# Patient Record
Sex: MALE | Race: WHITE | NOT HISPANIC OR LATINO | Employment: UNEMPLOYED | URBAN - METROPOLITAN AREA
[De-identification: names, ages, dates, MRNs, and addresses within clinical notes are randomized per-mention and may not be internally consistent; named-entity substitution may affect disease eponyms.]

---

## 2017-10-04 ENCOUNTER — TELEPHONE (OUTPATIENT)
Dept: PRIMARY CARE CLINIC | Facility: CLINIC | Age: 27
End: 2017-10-04

## 2017-10-04 NOTE — TELEPHONE ENCOUNTER
----- Message from Liannaloree Sutton sent at 10/4/2017 11:14 AM CDT -----  Patient states that he need to see the doctor/NP today for swollen foot and headaches.  Please call [phone].

## 2017-10-04 NOTE — TELEPHONE ENCOUNTER
Spoke with patient, states foot is swollen and thinks it is a gout attack, notified patient we have no openings for today states understanding and scheduled appointment for patient tomorrow morning

## 2017-10-05 ENCOUNTER — TELEPHONE (OUTPATIENT)
Dept: PRIMARY CARE CLINIC | Facility: CLINIC | Age: 27
End: 2017-10-05

## 2017-10-05 ENCOUNTER — OFFICE VISIT (OUTPATIENT)
Dept: PRIMARY CARE CLINIC | Facility: CLINIC | Age: 27
End: 2017-10-05
Payer: COMMERCIAL

## 2017-10-05 VITALS
TEMPERATURE: 99 F | RESPIRATION RATE: 18 BRPM | BODY MASS INDEX: 45.92 KG/M2 | DIASTOLIC BLOOD PRESSURE: 85 MMHG | HEIGHT: 68 IN | SYSTOLIC BLOOD PRESSURE: 137 MMHG | WEIGHT: 303 LBS | HEART RATE: 86 BPM

## 2017-10-05 DIAGNOSIS — L21.0 DANDRUFF IN ADULT: ICD-10-CM

## 2017-10-05 DIAGNOSIS — M10.9 GOUT, ARTHRITIS: Primary | ICD-10-CM

## 2017-10-05 DIAGNOSIS — E66.3 OVERWEIGHT: ICD-10-CM

## 2017-10-05 PROCEDURE — 90686 IIV4 VACC NO PRSV 0.5 ML IM: CPT | Mod: S$GLB,,, | Performed by: INTERNAL MEDICINE

## 2017-10-05 PROCEDURE — 99999 PR PBB SHADOW E&M-EST. PATIENT-LVL III: CPT | Mod: PBBFAC,,, | Performed by: INTERNAL MEDICINE

## 2017-10-05 PROCEDURE — 99213 OFFICE O/P EST LOW 20 MIN: CPT | Mod: 25,S$GLB,, | Performed by: INTERNAL MEDICINE

## 2017-10-05 PROCEDURE — 90471 IMMUNIZATION ADMIN: CPT | Mod: S$GLB,,, | Performed by: INTERNAL MEDICINE

## 2017-10-05 NOTE — PROGRESS NOTES
Flu vaccine 0.5ml IM given left deltoid w/aseptic technique, tolerated well.  No adverse reaction noted.  VIS given. Voiced no complaints.

## 2017-10-05 NOTE — TELEPHONE ENCOUNTER
----- Message from Selma Aguila sent at 10/5/2017 11:19 AM CDT -----  Contact: Jodi with [facility] phone [phone] fax [phone]  Jodi with [facility] phone [phone] fax [phone], Needs to know for Rx Nizoral shampoo is it 1% or 2%. Pleases advise. Thanks.

## 2017-10-06 RX ORDER — KETOCONAZOLE 20 MG/ML
SHAMPOO, SUSPENSION TOPICAL
Qty: 300 ML | Refills: 1
Start: 2017-10-09 | End: 2018-08-09

## 2017-10-06 RX ORDER — CEPHALEXIN 500 MG/1
500 TABLET ORAL 4 TIMES DAILY
Qty: 40 TABLET | Refills: 0
Start: 2017-10-06 | End: 2017-10-16

## 2017-10-06 RX ORDER — PREDNISONE 20 MG/1
20 TABLET ORAL 2 TIMES DAILY
Qty: 14 TABLET | Refills: 0
Start: 2017-10-06 | End: 2017-10-13

## 2017-10-06 NOTE — PROGRESS NOTES
Subjective:       Patient ID: Mickey D Jeansonne is a 27 y.o. male.    Chief Complaint: Foot Pain (right) and Headache    HPI  Pt c/o rt ankle swelling and pain few days not better with gout med at home no trauma no other joint pain and bad dandruff on scalp itching no fever chill no sob cp   Review of Systems    Objective:      Physical Exam   Constitutional: He is oriented to person, place, and time. He appears well-developed and well-nourished. No distress.   overwt   HENT:   Head: Normocephalic and atraumatic.   Right Ear: External ear normal.   Left Ear: External ear normal.   Nose: Nose normal.   Mouth/Throat: Oropharynx is clear and moist. No oropharyngeal exudate.   Scalp with thick browniish oily dandruff   Eyes: Conjunctivae and EOM are normal. Pupils are equal, round, and reactive to light. Right eye exhibits no discharge. Left eye exhibits no discharge.   Neck: Normal range of motion. Neck supple. No thyromegaly present.   Cardiovascular: Normal rate, regular rhythm, normal heart sounds and intact distal pulses.  Exam reveals no gallop and no friction rub.    No murmur heard.  Pulmonary/Chest: Effort normal and breath sounds normal. No respiratory distress. He has no wheezes. He has no rales. He exhibits no tenderness.   Abdominal: Soft. Bowel sounds are normal. He exhibits no distension. There is no tenderness. There is no rebound and no guarding.   Musculoskeletal: Normal range of motion. He exhibits tenderness (rt ankle tender swollen). He exhibits no edema or deformity.   Lymphadenopathy:     He has no cervical adenopathy.   Neurological: He is alert and oriented to person, place, and time.   Skin: Skin is warm and dry. Capillary refill takes less than 2 seconds. No rash noted. No erythema.   Psychiatric: He has a normal mood and affect. Judgment and thought content normal.   Nursing note and vitals reviewed.      Assessment:       1. Gout, arthritis    2. Dandruff in adult    3. Overweight         Plan:       Gout, arthritis  Comments:  rt ankle    Dandruff in adult    Overweight  Comments:  wt loss diet    Other orders  -     Influenza - Quadrivalent (3 years & older) (PF)

## 2017-11-17 RX ORDER — FUROSEMIDE 40 MG/1
40 TABLET ORAL 2 TIMES DAILY
Qty: 60 TABLET | Refills: 11 | Status: SHIPPED | OUTPATIENT
Start: 2017-11-17 | End: 2022-09-01 | Stop reason: SDUPTHER

## 2017-11-17 NOTE — TELEPHONE ENCOUNTER
----- Message from Jessie Galvez sent at 11/17/2017  9:32 AM CST -----  Contact: Gold Kelley is requesting refill Rx Lasix to be sent to [facility] on [address]. Out of medication. Thanks!

## 2018-08-09 ENCOUNTER — OFFICE VISIT (OUTPATIENT)
Dept: PRIMARY CARE CLINIC | Facility: CLINIC | Age: 28
End: 2018-08-09
Payer: COMMERCIAL

## 2018-08-09 VITALS
SYSTOLIC BLOOD PRESSURE: 149 MMHG | WEIGHT: 303 LBS | HEART RATE: 118 BPM | HEIGHT: 68 IN | DIASTOLIC BLOOD PRESSURE: 83 MMHG | BODY MASS INDEX: 45.92 KG/M2 | RESPIRATION RATE: 18 BRPM | OXYGEN SATURATION: 98 %

## 2018-08-09 DIAGNOSIS — E66.9 OBESITY, UNSPECIFIED CLASSIFICATION, UNSPECIFIED OBESITY TYPE, UNSPECIFIED WHETHER SERIOUS COMORBIDITY PRESENT: ICD-10-CM

## 2018-08-09 DIAGNOSIS — M10.9 GOUT, UNSPECIFIED CAUSE, UNSPECIFIED CHRONICITY, UNSPECIFIED SITE: ICD-10-CM

## 2018-08-09 DIAGNOSIS — M25.473 ANKLE EDEMA: ICD-10-CM

## 2018-08-09 DIAGNOSIS — M79.672 LEFT FOOT PAIN: Primary | ICD-10-CM

## 2018-08-09 DIAGNOSIS — I10 HYPERTENSION, UNSPECIFIED TYPE: ICD-10-CM

## 2018-08-09 PROCEDURE — 3008F BODY MASS INDEX DOCD: CPT | Mod: CPTII,S$GLB,, | Performed by: FAMILY MEDICINE

## 2018-08-09 PROCEDURE — 99999 PR PBB SHADOW E&M-EST. PATIENT-LVL IV: CPT | Mod: PBBFAC,,, | Performed by: FAMILY MEDICINE

## 2018-08-09 PROCEDURE — 81002 URINALYSIS NONAUTO W/O SCOPE: CPT | Mod: S$GLB,,, | Performed by: FAMILY MEDICINE

## 2018-08-09 PROCEDURE — 96372 THER/PROPH/DIAG INJ SC/IM: CPT | Mod: S$GLB,,, | Performed by: FAMILY MEDICINE

## 2018-08-09 PROCEDURE — 93000 ELECTROCARDIOGRAM COMPLETE: CPT | Mod: S$GLB,,, | Performed by: FAMILY MEDICINE

## 2018-08-09 PROCEDURE — 99213 OFFICE O/P EST LOW 20 MIN: CPT | Mod: 25,S$GLB,, | Performed by: FAMILY MEDICINE

## 2018-08-09 RX ORDER — METHYLPREDNISOLONE 4 MG/1
TABLET ORAL
Qty: 1 PACKAGE | Refills: 0 | Status: SHIPPED | OUTPATIENT
Start: 2018-08-09 | End: 2018-08-30

## 2018-08-09 RX ORDER — BETAMETHASONE SODIUM PHOSPHATE AND BETAMETHASONE ACETATE 3; 3 MG/ML; MG/ML
12 INJECTION, SUSPENSION INTRA-ARTICULAR; INTRALESIONAL; INTRAMUSCULAR; SOFT TISSUE
Status: COMPLETED | OUTPATIENT
Start: 2018-08-09 | End: 2018-08-09

## 2018-08-09 RX ORDER — DICLOFENAC SODIUM 75 MG/1
TABLET, DELAYED RELEASE ORAL
Qty: 60 TABLET | Refills: 5 | Status: SHIPPED | OUTPATIENT
Start: 2018-08-09 | End: 2018-12-23

## 2018-08-09 RX ORDER — LOSARTAN POTASSIUM 50 MG/1
50 TABLET ORAL DAILY
Qty: 90 TABLET | Refills: 3 | Status: SHIPPED | OUTPATIENT
Start: 2018-08-09 | End: 2021-12-19

## 2018-08-09 RX ORDER — HYDROCODONE BITARTRATE AND ACETAMINOPHEN 5; 325 MG/1; MG/1
TABLET ORAL
Qty: 30 TABLET | Refills: 0 | Status: SHIPPED | OUTPATIENT
Start: 2018-08-09 | End: 2022-06-01

## 2018-08-09 RX ADMIN — BETAMETHASONE SODIUM PHOSPHATE AND BETAMETHASONE ACETATE 12 MG: 3; 3 INJECTION, SUSPENSION INTRA-ARTICULAR; INTRALESIONAL; INTRAMUSCULAR; SOFT TISSUE at 03:08

## 2018-08-09 NOTE — PROGRESS NOTES
Patient ID by name and . NKDA. Celestone 12 mg IM injection given in right ventrogluteal using aseptic technique. Aspirated with no blood noted. Patient tolerated well. Given per physicians order. No adverse reaction noted.

## 2018-08-09 NOTE — PROGRESS NOTES
Subjective:       Patient ID: Mickey D Jeansonne is a 27 y.o. male.    Chief Complaint: Gout (left foot)    HPI:  27-year-old white male states has had history of gout usually gets it nose right foot.  Started with some swelling and pain in the left foot--started Wednesday--was worse this a.m. patient went to work and had to leave work because he was unable walk and work on it.  Getting attacks of gout about every month.    ROS:  Skin: no psoriasis, eczema, skin cancer  HEENT: + headache,no  ocular pain, blurred vision, diplopia, epistaxis, hoarseness change in voice, thyroid trouble  Lung: No pneumonia, asthma, Tb, wheezing, SOB, no smoking  Heart: No chest pain, +ankle edema, no  palpitations, MI, tiffany murmur, hypertension, hyperlipidemia  Abdomen: No nausea, vomiting, diarrhea, constipation, ulcers, hepatitis, gallbladder disease, melena, hematochezia, hematemesis--occas passes blood rectum last time few months ago   : no UTI, renal disease, stones, prostate  MS: no fractures, O/A, lupus, rheumatoid,+ gout  Neuro: No dizziness, LOC, seizures   No diabetes, no anemia, no anxiety, no depression   Single , no children, 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    Objective:   Physical Exam:  General: Well nourished, well developed, no acute distress +obese  Skin: No lesions  HEENT: Eyes PERRLA, EOM intact, nose patent, throat non-erythematous ears TM clear  NECK: Supple, no bruits, No JVD, no nodes  Lungs: Clear, no rales, rhonchi, wheezing  Heart: Regular rate and rhythm, no murmurs, gallops, or rubs  Abdomen: flat, bowel sounds positive, no tenderness, or organomegaly  MS:  Some swelling of the dorsum of the left foot and 1st MTP pain with palpation pain with ambulation pain with standing on tip toes are squatting or walk  Neuro: Alert, CN intact, oriented X 3  Extremities: No cyanosis, clubbing, or edema         Assessment:       1. Left foot pain    2. Gout, unspecified cause,  unspecified chronicity, unspecified site    3. Obesity, unspecified classification, unspecified obesity type, unspecified whether serious comorbidity present    4. Hypertension, unspecified type    5. Ankle edema        Plan:       Exercise/decrease weight/low-sodium diet  Cozaar 50 mg 1 p.o. q.d. for hypertension by an arm blood pressure cuff check blood pressure daily  Tory own/Norco/Medrol--diclofenac for gout  X-ray left and right feet to evaluate severity of gout  Routine lab CBCs CMP lipids T4 TSH uric acid level UA chest x-ray EKG is physical do 2 weeks after off steroids  --if lab OK start allopurinol 100 mg q.day

## 2018-08-09 NOTE — LETTER
August 9, 2018      [facility]  [address] [place] [address]  Phone: [phone]  Fax: [phone]       Patient: Mickey Mickey Jeansonne   YOB: 1990  Date of Visit: 08/09/2018    To Whom It May Concern:    Mickey Jeansonne  was at [facility] on 08/09/2018. He may return to work on 08/13/2018 with no restrictions. If you have any questions or concerns, or if I can be of further assistance, please do not hesitate to contact me.    Sincerely,    Liuza Martin LPN

## 2018-08-20 ENCOUNTER — TELEPHONE (OUTPATIENT)
Dept: PRIMARY CARE CLINIC | Facility: CLINIC | Age: 28
End: 2018-08-20

## 2018-08-20 DIAGNOSIS — M79.672 LEFT FOOT PAIN: Primary | ICD-10-CM

## 2018-08-20 NOTE — TELEPHONE ENCOUNTER
----- Message from Zeke Oliveira MD sent at 8/12/2018  7:55 PM CDT -----  Call tell 1st MTP with arthritis suggestive gouty arth and has calcaneal spurs pt needs get copies both xrays and see podiatrist Dr Ricketts with insurance or Dr Chavez or [facility] if medicaid.Consrvative tx for now

## 2018-08-29 ENCOUNTER — CLINICAL SUPPORT (OUTPATIENT)
Dept: PRIMARY CARE CLINIC | Facility: CLINIC | Age: 28
End: 2018-08-29
Payer: COMMERCIAL

## 2018-08-29 DIAGNOSIS — I10 HYPERTENSION, UNSPECIFIED TYPE: ICD-10-CM

## 2018-08-29 DIAGNOSIS — M79.672 LEFT FOOT PAIN: ICD-10-CM

## 2018-08-29 DIAGNOSIS — M10.9 GOUT, UNSPECIFIED CAUSE, UNSPECIFIED CHRONICITY, UNSPECIFIED SITE: ICD-10-CM

## 2018-08-29 DIAGNOSIS — M25.473 ANKLE EDEMA: ICD-10-CM

## 2018-08-29 LAB
ALBUMIN SERPL BCP-MCNC: 3.8 G/DL
ALP SERPL-CCNC: 57 U/L
ALT SERPL W/O P-5'-P-CCNC: 29 U/L
ANION GAP SERPL CALC-SCNC: 7 MMOL/L
AST SERPL-CCNC: 21 U/L
BASOPHILS # BLD AUTO: 0.1 K/UL
BASOPHILS NFR BLD: 1.2 %
BILIRUB SERPL-MCNC: 0.7 MG/DL
BILIRUB SERPL-MCNC: NORMAL MG/DL
BLOOD URINE, POC: NORMAL
BUN SERPL-MCNC: 11 MG/DL
CALCIUM SERPL-MCNC: 9.3 MG/DL
CHLORIDE SERPL-SCNC: 102 MMOL/L
CHOLEST SERPL-MCNC: 179 MG/DL
CHOLEST/HDLC SERPL: 4 {RATIO}
CO2 SERPL-SCNC: 30 MMOL/L
COLOR, POC UA: YELLOW
CREAT SERPL-MCNC: 0.8 MG/DL
DIFFERENTIAL METHOD: ABNORMAL
EOSINOPHIL # BLD AUTO: 0.2 K/UL
EOSINOPHIL NFR BLD: 2.1 %
ERYTHROCYTE [DISTWIDTH] IN BLOOD BY AUTOMATED COUNT: 15.1 %
ERYTHROCYTE [SEDIMENTATION RATE] IN BLOOD BY WESTERGREN METHOD: 48 MM/HR
EST. GFR  (AFRICAN AMERICAN): >60 ML/MIN/1.73 M^2
EST. GFR  (NON AFRICAN AMERICAN): >60 ML/MIN/1.73 M^2
GLUCOSE SERPL-MCNC: 108 MG/DL
GLUCOSE UR QL STRIP: NORMAL
HCT VFR BLD AUTO: 39.7 %
HDLC SERPL-MCNC: 45 MG/DL
HDLC SERPL: 25.1 %
HGB BLD-MCNC: 12.9 G/DL
KETONES UR QL STRIP: NORMAL
LDLC SERPL CALC-MCNC: 117 MG/DL
LEUKOCYTE ESTERASE URINE, POC: NORMAL
LYMPHOCYTES # BLD AUTO: 2.2 K/UL
LYMPHOCYTES NFR BLD: 24.1 %
MCH RBC QN AUTO: 27 PG
MCHC RBC AUTO-ENTMCNC: 32.5 G/DL
MCV RBC AUTO: 83 FL
MONOCYTES # BLD AUTO: 0.6 K/UL
MONOCYTES NFR BLD: 6.5 %
NEUTROPHILS # BLD AUTO: 5.9 K/UL
NEUTROPHILS NFR BLD: 66.1 %
NITRITE, POC UA: NORMAL
NONHDLC SERPL-MCNC: 134 MG/DL
PH, POC UA: 5
PLATELET # BLD AUTO: 317 K/UL
PMV BLD AUTO: 9 FL
POTASSIUM SERPL-SCNC: 4.1 MMOL/L
PROT SERPL-MCNC: 8.1 G/DL
PROTEIN, POC: NORMAL
RBC # BLD AUTO: 4.77 M/UL
SODIUM SERPL-SCNC: 139 MMOL/L
SPECIFIC GRAVITY, POC UA: 1.01
T4 FREE SERPL-MCNC: 0.94 NG/DL
TRIGL SERPL-MCNC: 85 MG/DL
TSH SERPL DL<=0.005 MIU/L-ACNC: 1.74 UIU/ML
URATE SERPL-MCNC: 11 MG/DL
UROBILINOGEN, POC UA: NORMAL
WBC # BLD AUTO: 9 K/UL

## 2018-08-29 PROCEDURE — 99999 PR PBB SHADOW E&M-EST. PATIENT-LVL II: CPT | Mod: PBBFAC,,,

## 2018-08-30 LAB — EKG 12-LEAD: NORMAL

## 2018-08-31 ENCOUNTER — TELEPHONE (OUTPATIENT)
Dept: PRIMARY CARE CLINIC | Facility: CLINIC | Age: 28
End: 2018-08-31

## 2018-08-31 NOTE — TELEPHONE ENCOUNTER
----- Message from Rose Goins sent at 8/31/2018 10:39 AM CDT -----  Type: Needs Medical Advice    Who Called:  Patient  Symptoms (please be specific):  gout  How long has patient had these symptoms:  Jordana  Pharmacy name and phone #:  J[facility]    Best Call Back Number: [phone] (home)     Additional Information: Stating he works at [employer] and wanted a note due to his gout to the employer stating he would need to sit down due to his gout. Can  today from the office

## 2018-09-05 DIAGNOSIS — M10.00 IDIOPATHIC GOUT, UNSPECIFIED CHRONICITY, UNSPECIFIED SITE: Primary | ICD-10-CM

## 2018-09-06 DIAGNOSIS — M25.50 ARTHRALGIA, UNSPECIFIED JOINT: Primary | ICD-10-CM

## 2018-09-06 NOTE — TELEPHONE ENCOUNTER
----- Message from Zeke Oliveira MD sent at 8/31/2018  7:58 AM CDT -----  Sed rate 48 slight increase do  JOANNE,RF , RPR also  Uric acid 11.0 needs be on allopurinal Start on allopurinal 100 mg 1 po qd redo uric acid 3 mo if still high and kidney function still OK n increase allopurinal to 300 mg 1 po qd

## 2018-09-07 RX ORDER — ALLOPURINOL 100 MG/1
100 TABLET ORAL DAILY
Qty: 30 TABLET | Refills: 3 | Status: SHIPPED | OUTPATIENT
Start: 2018-09-07 | End: 2022-01-17

## 2018-09-07 NOTE — TELEPHONE ENCOUNTER
----- Message from Jess Parish sent at 9/7/2018  1:24 PM CDT -----  Type: Needs Medical Advice    Who Called:  Lucrecia  Symptoms (please be specific):  HARVEYow long has patient had these symptoms:  VIK  Pharmacy name and phone #:  Best Call Back Number:[phone]  Additional Information: the internal medicine lab, called stating the lab doesn't have orders for fit kit for the patient

## 2018-09-11 ENCOUNTER — TELEPHONE (OUTPATIENT)
Dept: PRIMARY CARE CLINIC | Facility: CLINIC | Age: 28
End: 2018-09-11

## 2018-09-11 ENCOUNTER — OFFICE VISIT (OUTPATIENT)
Dept: PRIMARY CARE CLINIC | Facility: CLINIC | Age: 28
End: 2018-09-11
Payer: COMMERCIAL

## 2018-09-11 VITALS
WEIGHT: 309 LBS | BODY MASS INDEX: 46.83 KG/M2 | SYSTOLIC BLOOD PRESSURE: 118 MMHG | HEART RATE: 78 BPM | OXYGEN SATURATION: 97 % | HEIGHT: 68 IN | TEMPERATURE: 98 F | DIASTOLIC BLOOD PRESSURE: 77 MMHG | RESPIRATION RATE: 18 BRPM

## 2018-09-11 DIAGNOSIS — E66.01 MORBID OBESITY WITH BMI OF 45.0-49.9, ADULT: ICD-10-CM

## 2018-09-11 DIAGNOSIS — M10.00 IDIOPATHIC GOUT, UNSPECIFIED CHRONICITY, UNSPECIFIED SITE: Primary | ICD-10-CM

## 2018-09-11 DIAGNOSIS — I10 HYPERTENSION, UNSPECIFIED TYPE: ICD-10-CM

## 2018-09-11 DIAGNOSIS — Z12.11 COLON CANCER SCREENING: Primary | ICD-10-CM

## 2018-09-11 DIAGNOSIS — M79.672 LEFT FOOT PAIN: ICD-10-CM

## 2018-09-11 PROBLEM — E66.9 OBESITY: Status: RESOLVED | Noted: 2018-08-09 | Resolved: 2018-09-11

## 2018-09-11 PROCEDURE — 99999 PR PBB SHADOW E&M-EST. PATIENT-LVL III: CPT | Mod: PBBFAC,,, | Performed by: FAMILY MEDICINE

## 2018-09-11 PROCEDURE — 99213 OFFICE O/P EST LOW 20 MIN: CPT | Mod: S$GLB,,, | Performed by: FAMILY MEDICINE

## 2018-09-11 PROCEDURE — 3008F BODY MASS INDEX DOCD: CPT | Mod: CPTII,S$GLB,, | Performed by: FAMILY MEDICINE

## 2018-09-11 NOTE — TELEPHONE ENCOUNTER
----- Message from Shalonda Damian sent at 9/11/2018  3:13 PM CDT -----  Contact: Lucrecia with [facility] [phone]  Lucrecia with [facility] [phone] calling because she needs a new order for an FOBG put into Epic. Please advise. Thanks.

## 2018-09-11 NOTE — PROGRESS NOTES
Subjective:       Patient ID: Mickey D Jeansonne is a 27 y.o. male.    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  Patient was told only able to sit if gets a note from his doctor.  Pain occurs mainly in the left foot that causes problems where the patient has to sit.  Uric acid level was 11--patient is on allopurinol only x3 days--after 3 month patient may be able to increase to 300 mg if no side effects.  Patient told to continue low purine diet, drink black cherry juice.  Continue to uses NSAIDs presently on Voltaren--uses Indocin for gouty attack.  Patient trying to get SSI wants no pregnant at.       The nature of gout is fully explained, including dietary relationship, acute and interval phase and treatment of both. Long term complications such as kidney stones, tophi and arthritis are discussed. Avoidance of alcohol recommended, and written literature is given along with a low purine diet. Indications for the use of allopurinol for prophylaxis and the use of colchicine to prevent or treat flare-ups is also discussed. Proper use of indomethacin for acute attacks discussed, and its side effects. Call if further attacks occur, or this one does not resolve promptly.          Office visit 08/09/2018-- 27-year-old white male states has had history of gout usually gets it right foot.  Started with some swelling and pain in the left foot--started Wednesday--was worse this a.m. patient went to work and had to leave work because he was unable walk and work on it.  Getting attacks of gout about every month.    ROS:  Skin: no psoriasis, eczema, skin cancer  HEENT: + headache,no  ocular pain, blurred vision, diplopia, epistaxis, hoarseness change in voice, thyroid  trouble  Lung: No pneumonia, asthma, Tb, wheezing, SOB, no smoking  Heart: No chest pain, +ankle edema, no  palpitations, MI, tiffany murmur, hypertension, hyperlipidemia  Abdomen: No nausea, vomiting, diarrhea, constipation, ulcers, hepatitis, gallbladder disease, melena, hematochezia, hematemesis--occas passes blood rectum last time few months ago   : no UTI, renal disease, stones, prostate  MS: no fractures, O/A, lupus, rheumatoid,+ gout  Neuro: No dizziness, LOC, seizures   No diabetes, no anemia, no anxiety, no depression   Single , no children, 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 Idiopathic gout, unspecified chronicity, unspecified site    2. Left foot pain    3. Morbid obesity with BMI of 45.0-49.9, adult    4. Hypertension, unspecified type        Plan:       Exercise/decrease weight/low-sodium diet low purine  Cozaar 50 mg 1 p.o. q.d. for hypertension by an arm blood pressure cuff check blood pressure daily  Patient told to continue allopurinol 100 mg q.d.--do CMP in uric acid 3 months if kidney functions okay increase to 300 mg q.d.  Continue Voltaren-routinely/and a son with gouty attack---if pain persists may need to see a podiatrist  X-ray left and right feet --done --has some cystic changes in the distal tuft some narrowing of some the interphalangeal joint see report  Patient given note able to sit at work  Patient was get SSI told best to  try maximum is calorie therapy and get into weight loss program and may be able to continue work  Patient needs a gastric banding of gastric partition due to morbid obesity--a feels will help a large number of his medical issues

## 2018-09-12 ENCOUNTER — LAB VISIT (OUTPATIENT)
Dept: LAB | Facility: HOSPITAL | Age: 28
End: 2018-09-12
Attending: INTERNAL MEDICINE
Payer: COMMERCIAL

## 2018-09-12 DIAGNOSIS — Z12.11 COLON CANCER SCREENING: ICD-10-CM

## 2018-09-12 LAB — HEMOCCULT STL QL IA: NEGATIVE

## 2018-09-12 PROCEDURE — 82274 ASSAY TEST FOR BLOOD FECAL: CPT

## 2020-05-29 ENCOUNTER — TELEPHONE (OUTPATIENT)
Dept: SLEEP MEDICINE | Facility: CLINIC | Age: 30
End: 2020-05-29

## 2020-05-29 NOTE — TELEPHONE ENCOUNTER
----- Message from Amara Ignacio sent at 5/29/2020  9:38 AM CDT -----  Good Morning,    Adelfo Oliveira NP is referring this patient for a CPAP. I have scanned the order in . Please let me know if there is anything you need to get this patient scheduled.    Thank you,  Amara

## 2020-06-18 ENCOUNTER — PATIENT OUTREACH (OUTPATIENT)
Dept: ADMINISTRATIVE | Facility: OTHER | Age: 30
End: 2020-06-18

## 2020-06-18 NOTE — PROGRESS NOTES
Chart reviewed.   Immunizations: Triggered Imm Registry     Orders placed: n/a  Upcoming appts to satisfy EDSON topics: n/a

## 2020-09-28 ENCOUNTER — OFFICE VISIT (OUTPATIENT)
Dept: SLEEP MEDICINE | Facility: CLINIC | Age: 30
End: 2020-09-28
Payer: MEDICAID

## 2020-09-28 DIAGNOSIS — I10 HYPERTENSION, UNSPECIFIED TYPE: ICD-10-CM

## 2020-09-28 DIAGNOSIS — E66.01 MORBID OBESITY WITH BMI OF 45.0-49.9, ADULT: ICD-10-CM

## 2020-09-28 DIAGNOSIS — G47.33 OSA (OBSTRUCTIVE SLEEP APNEA): Primary | ICD-10-CM

## 2020-09-28 PROCEDURE — 99203 OFFICE O/P NEW LOW 30 MIN: CPT | Mod: 95,,, | Performed by: INTERNAL MEDICINE

## 2020-09-28 PROCEDURE — 99203 PR OFFICE/OUTPT VISIT, NEW, LEVL III, 30-44 MIN: ICD-10-PCS | Mod: 95,,, | Performed by: INTERNAL MEDICINE

## 2020-09-28 NOTE — PROGRESS NOTES
Subjective:       Patient ID: Mickey D Jeansonne is a 30 y.o. male.    Chief Complaint: Sleep Apnea    HPI     I had the pleasure of seeing Mickey D Jeansonne today, who is a 30 y.o. male that presents with Obstructive Sleep Apnea.    Mickey D Jeansonne does not have a CDL.    Mickey D Jeansonne is not a shift worker.    Mickey D Jeansonne presents with GASTON that has been going on for 18 months    Bedtime when working ranges from NA to NA.   When not working, bedtime ranges from 2230 to 0230.   Sleep latency ranges from 30 to 45 minutes.     Average number of awakenings is 3-4 and return to sleep is variable.   Wake up time when working is NA to NA.   When not working, wake up time is 0700 to 0730.   Patient does not feel rested upon awakening.    Mickey D Jeansonne consumes approximately 0 beverages with caffeine daily.   An average of 0 beverages with alcohol are consumed    Medications taken for sleep currently: none  Previous medications taken: none     Mickey D Jeansonne does experience daytime sleepiness.   Naps are taken about 7-14 times weekly, usually lasting 0 to 60 minutes.  Gold currently does not operate an automobile.  Mickey D Jeansonne NA experience drowsiness when driving.   Patient does doze off when sedentary.   Mickey D Jeansonne does not have auxiliary symptoms of narcolepsy including sleep onset paralysis, hypnagogic hallucinations, sleep attacks and cataplexy    EPWORTH SLEEPINESS SCALE 5/29/2020   Sitting and reading 2   Watching TV 2   Sitting, inactive in a public place (e.g. a theatre or a meeting) 2   As a passenger in a car for an hour without a break 3   Lying down to rest in the afternoon when circumstances permit 2   Sitting and talking to someone 2   Sitting quietly after a lunch without alcohol 2   In a car, while stopped for a few minutes in traffic 3   Total score 18       Mickey D Jeansonne has a history of snoring.   Snoring is described as severe and constant.   Apneic  episodes have been noticed during sleep.   A witness to sleep is present.   The patient awakens with mouth dryness.      Mickey D Jeansonne does not have symptoms of Restless Legs Syndrome. Nocturnal leg movements have not been noticed.   The patient does not experience sleep related leg cramps.   There is not a history of parasomnia .      Current Outpatient Medications:     allopurinol (ZYLOPRIM) 100 MG tablet, Take 1 tablet (100 mg total) by mouth once daily., Disp: 30 tablet, Rfl: 3    colchicine (COLCRYS) 0.6 mg tablet, Take 1 tablet (0.6 mg total) by mouth 2 (two) times daily as needed (pain)., Disp: 6 tablet, Rfl: 0    furosemide (LASIX) 40 MG tablet, Take 1 tablet (40 mg total) by mouth 2 (two) times daily., Disp: 60 tablet, Rfl: 11    HYDROcodone-acetaminophen (NORCO) 5-325 mg per tablet, One p.o. q.6 hours p.r.n. pain, Disp: 30 tablet, Rfl: 0    losartan (COZAAR) 50 MG tablet, Take 1 tablet (50 mg total) by mouth once daily., Disp: 90 tablet, Rfl: 3     Review of patient's allergies indicates:  No Known Allergies      Past Medical History:   Diagnosis Date    Edema     Gout     Hypertension        No past surgical history on file.    No family history on file.    Social History     Socioeconomic History    Marital status: Single     Spouse name: Not on file    Number of children: Not on file    Years of education: Not on file    Highest education level: Not on file   Occupational History    Not on file   Social Needs    Financial resource strain: Not on file    Food insecurity     Worry: Not on file     Inability: Not on file    Transportation needs     Medical: Not on file     Non-medical: Not on file   Tobacco Use    Smoking status: Never Smoker    Smokeless tobacco: Never Used   Substance and Sexual Activity    Alcohol use: No    Drug use: No    Sexual activity: Never   Lifestyle    Physical activity     Days per week: Not on file     Minutes per session: Not on file    Stress:  Not on file   Relationships    Social connections     Talks on phone: Not on file     Gets together: Not on file     Attends Protestant service: Not on file     Active member of club or organization: Not on file     Attends meetings of clubs or organizations: Not on file     Relationship status: Not on file   Other Topics Concern    Not on file   Social History Narrative    Not on file           Old medical records.    There were no vitals filed for this visit.           The patient was given open opportunity to ask questions and/or express concerns about treatment plan.   All questions/concerns were discussed.   Driving precautions were provided.     Two patient identifiers used prior to evaluation.    Thank you for referring Mickey D Jeansonne for evaluation.           Review of Systems      Objective:      Physical Exam    Assessment:       1. GASTON (obstructive sleep apnea)    2. Morbid obesity with BMI of 45.0-49.9, adult    3. Hypertension, unspecified type        Plan:         Goals of therapy were discussed, alternative treatments listed and patient agrees to this form of therapy if indicated.   The pathophysiology of GASTON was discussed.   The effects of GASTON on patient's co-morbid conditions and the increased morbidity and/or mortality associated with this condition were reviewed.   The patient was given open opportunity to ask questions and/or express concerns about treatment plan.   All questions/concerns were discussed.   Driving precautions were provided.       Thank you for referring Mickey D Jeansonne for evaluation.       The patient location is: home  The chief complaint leading to consultation is: GASTON    Visit type: audiovisual    Face to Face time with patient: 21  32 minutes of total time spent on the encounter, which includes face to face time and non-face to face time preparing to see the patient (eg, review of tests), Obtaining and/or reviewing separately obtained history, Documenting clinical  information in the electronic or other health record, Independently interpreting results (not separately reported) and communicating results to the patient/family/caregiver, or Care coordination (not separately reported).         Each patient to whom he or she provides medical services by telemedicine is:  (1) informed of the relationship between the physician and patient and the respective role of any other health care provider with respect to management of the patient; and (2) notified that he or she may decline to receive medical services by telemedicine and may withdraw from such care at any time.    Notes:

## 2020-10-08 DIAGNOSIS — G47.33 OSA (OBSTRUCTIVE SLEEP APNEA): Primary | ICD-10-CM

## 2020-10-08 DIAGNOSIS — I10 HYPERTENSION, UNSPECIFIED TYPE: ICD-10-CM

## 2020-10-08 DIAGNOSIS — E66.01 MORBID OBESITY WITH BMI OF 45.0-49.9, ADULT: ICD-10-CM

## 2020-10-09 ENCOUNTER — TELEPHONE (OUTPATIENT)
Dept: SLEEP MEDICINE | Facility: OTHER | Age: 30
End: 2020-10-09

## 2020-10-10 ENCOUNTER — HOSPITAL ENCOUNTER (OUTPATIENT)
Dept: SLEEP MEDICINE | Facility: OTHER | Age: 30
Discharge: HOME | End: 2020-10-10
Attending: INTERNAL MEDICINE
Payer: MEDICAID

## 2020-10-10 DIAGNOSIS — I10 HYPERTENSION, UNSPECIFIED TYPE: ICD-10-CM

## 2020-10-10 DIAGNOSIS — G47.34 NOCTURNAL HYPOXEMIA: Primary | ICD-10-CM

## 2020-10-10 DIAGNOSIS — E66.2 OBESITY HYPOVENTILATION SYNDROME: ICD-10-CM

## 2020-10-10 DIAGNOSIS — E66.01 MORBID OBESITY WITH BMI OF 45.0-49.9, ADULT: ICD-10-CM

## 2020-10-10 DIAGNOSIS — G47.33 OSA (OBSTRUCTIVE SLEEP APNEA): ICD-10-CM

## 2020-10-10 PROCEDURE — 95810 POLYSOM 6/> YRS 4/> PARAM: CPT

## 2020-10-10 PROCEDURE — 95810 PR POLYSOMNOGRAPHY, 4 OR MORE: ICD-10-PCS | Mod: 26,,, | Performed by: INTERNAL MEDICINE

## 2020-10-10 PROCEDURE — 95810 POLYSOM 6/> YRS 4/> PARAM: CPT | Mod: 26,,, | Performed by: INTERNAL MEDICINE

## 2020-10-11 NOTE — PROGRESS NOTES
A PSG with ETCO2 monitoring was preformed on Mickey Jeansonne on the night of 10/10/2020. The procedure was explained to the patient. All questions were asked and answered prior to the start of the study. The patient did not meet split night criteria set by the doctor. No prior Covid testing done.     SDB events were noted. Snoring was noted to be mild to loud.    The EKG appeared to have shown NSR. The lowest Spo2 noted was 62%.     All sleep stages appeared during the night. The patient slept supine the entire night.    The ETCO2 ranged in the 40's and 50's.    Disposable equipment was used where applicable. An end of the night instruction sheet was giving to the patient upon leaving the lab.

## 2020-10-14 DIAGNOSIS — E66.2 OBESITY HYPOVENTILATION SYNDROME: ICD-10-CM

## 2020-10-14 DIAGNOSIS — Z03.818 ENCOUNTER FOR OBSERVATION FOR SUSPECTED EXPOSURE TO OTHER BIOLOGICAL AGENTS RULED OUT: ICD-10-CM

## 2020-10-14 DIAGNOSIS — E66.01 MORBID OBESITY WITH BMI OF 45.0-49.9, ADULT: ICD-10-CM

## 2020-10-14 DIAGNOSIS — G47.33 OSA (OBSTRUCTIVE SLEEP APNEA): Primary | ICD-10-CM

## 2020-10-14 NOTE — PROCEDURES
Patient Name: JEANSONNE, [facility] #: [id number]   Sex: Male Study Date: 10/10/2020   : 1990 Clinic #: [id number]   Age: 30 Referring Physician: Halle Mccauley MD   Height: 68.0 in Referring Physician #    Weight: 385.0 lbs Sleep Specialist:    FATIMAHI.: 58.5 Sleep Specialist #    Hypopnea rule: AASM 1B Scoring Tech: ESSIE Ragland, RPSGT   Total AHI: 134.2 Recording Tech: CARA YUAN RRT, RPSGT   Lowest O2 sat: 61.0% Recording Location: [facility]     Sleep architecture: This is a baseline polysomnogram. At lights out, the patient fell asleep in 14.7 minutes and slept for 77.2% of the time. Total sleep time (TST) was 343.0 minutes. 39.9% of TST was in Stage N1 sleep, 5.5% TST in slow wave sleep, and 9.8% TST in REM sleep. The REM latency was 196.5 minutes.     Respiratory: Snoring was present. There was significant GASTON (obstructive sleep apnea) based on AHI (apnea hypopnea index) criteria. The overall AHI was 134.2 with an oxygen josé miguel of 61.0%.  The supine AHI was 134.2 and the REM AHI was 130.7.   Saturation was below 89% for 222.5 minutes of sleep time.  End tidal C02 is elevated.     Motor movement / Parasomnia: There were no significant limb movements of sleep noted.     Cardiac: Cardiac rhythm monitoring revealed a normal sinus rhythm with occasional PACs and PVCs.      IMPRESSION:  1. Severe GASTON   2. Obesity hypoventilation Syndrome.    RECOMMENDATION:  1. CPAP is ordered pending CPAP titration.    2. The patient has follow up with Sleep Medicine

## 2020-10-15 ENCOUNTER — TELEPHONE (OUTPATIENT)
Dept: SLEEP MEDICINE | Facility: OTHER | Age: 30
End: 2020-10-15

## 2020-11-10 ENCOUNTER — HOSPITAL ENCOUNTER (OUTPATIENT)
Dept: SLEEP MEDICINE | Facility: OTHER | Age: 30
Discharge: HOME | End: 2020-11-10
Attending: INTERNAL MEDICINE
Payer: MEDICAID

## 2020-11-10 DIAGNOSIS — G47.33 OSA (OBSTRUCTIVE SLEEP APNEA): ICD-10-CM

## 2020-11-10 DIAGNOSIS — E66.2 OBESITY HYPOVENTILATION SYNDROME: ICD-10-CM

## 2020-11-10 DIAGNOSIS — E66.01 MORBID OBESITY WITH BMI OF 45.0-49.9, ADULT: ICD-10-CM

## 2020-11-10 PROCEDURE — 95811 POLYSOM 6/>YRS CPAP 4/> PARM: CPT | Mod: 26,,, | Performed by: INTERNAL MEDICINE

## 2020-11-10 PROCEDURE — 95811 POLYSOM 6/>YRS CPAP 4/> PARM: CPT

## 2020-11-10 PROCEDURE — 95811 PR POLYSOMNOGRAPHY W/CPAP: ICD-10-PCS | Mod: 26,,, | Performed by: INTERNAL MEDICINE

## 2020-11-11 NOTE — PROCEDURES
Patient Name: JEANSONNE, [facility] #: [id number]   Sex: Male Study Date: 11/10/2020   : 1990 Clinic #: [id number]   Age: 30 Referring Physician: Halle Mccauley   Height: 68.0 in Referring Physician #    Weight: 385.0 lbs Sleep Specialist:    JUDYM.I.: 58.5 Sleep Specialist #    Hypopnea rule: AASM 1B Scoring Tech: ESSIE Ragland, RPSGT   Total AHI: 5.1 Recording Tech CARA YUAN RRT, RPSGT   Lowest O2 sat: 79.0% Recording Location: [facility]     Sleep architecture: This is a CPAP titration study. At lights out, the patient fell asleep in 1.9 minutes. Sleep efficiency was 93.4%. Total sleep time (TST) was 413.7 minutes. Slow wave sleep proportion of TST was normal and REM stage sleep proportion of TST was increased. REM latency was 59.5 minutes.    Motor movement / Parasomnia: There were no significant limb movements of sleep noted.      Cardiac: Cardiac monitoring revealed a normal sinus rhythm with occasional PVCs.    CPAP/BIPAP titration: CPAP (continuous positive airway pressure) was explored from 5 to 12 cm of water.   Due to persistent events, patient changed to BIPAP at 14/10 cm H20 and adjusted to 16/12 cm H20.   Effective control of sleep disordered breathing was seen during supine REM stage sleep at a pressure of 14/10 cm of water.      IMPRESSION: GASTON .    RECOMMENDATION: BIPAP at EPAP 9-13, PS 4 cm H20 is ordered for home use.   The patient has follow up with Sleep Medicine.

## 2020-11-11 NOTE — PROGRESS NOTES
A Cpap and Bipap titration was preformed on mickey Jeansonne i=on the night of 11/10/2020. The procedure was explained to the patient, which included the placement and use of the electrodes, the cpap/bipap process, when and why the tech would need to enter the room. All questions were asked and answered prior to the start of the study.     EKG: Appeared to have shown NSR    Mask used: F&P Eson nasal mask size medium    Cpap range: 7-12 cmH2O, cflex 3, Humidity used    Bipap range:14/10 to 16/12 cmH2O, Biflex 3, Humidity used    SDB events were noted. All sleep stages were eached. The patient slept supine all night. Disposable equipment was used where applicabl. An end of the night instuction sheet was giving to the patient upon leaving the lab. The patient appeared to have tolerated cpap/bipap fairly well. The patient's response to cpap/bipap at the end of the night was that he thinks he slept better with it.

## 2020-12-11 ENCOUNTER — PATIENT MESSAGE (OUTPATIENT)
Dept: ADMINISTRATIVE | Facility: OTHER | Age: 30
End: 2020-12-11

## 2020-12-11 ENCOUNTER — PATIENT MESSAGE (OUTPATIENT)
Dept: OTHER | Facility: OTHER | Age: 30
End: 2020-12-11

## 2021-04-05 DIAGNOSIS — U07.1 COVID-19 VIRUS DETECTED: ICD-10-CM

## 2021-04-16 ENCOUNTER — OFFICE VISIT (OUTPATIENT)
Dept: PRIMARY CARE CLINIC | Facility: CLINIC | Age: 31
End: 2021-04-16
Payer: MEDICAID

## 2021-04-16 DIAGNOSIS — U07.1 RESPIRATORY TRACT INFECTION DUE TO COVID-19 VIRUS: ICD-10-CM

## 2021-04-16 DIAGNOSIS — I10 HYPERTENSION, UNSPECIFIED TYPE: Primary | ICD-10-CM

## 2021-04-16 DIAGNOSIS — J98.8 RESPIRATORY TRACT INFECTION DUE TO COVID-19 VIRUS: ICD-10-CM

## 2021-04-16 PROCEDURE — 99213 PR OFFICE/OUTPT VISIT, EST, LEVL III, 20-29 MIN: ICD-10-PCS | Mod: 95,,, | Performed by: INTERNAL MEDICINE

## 2021-04-16 PROCEDURE — 99213 OFFICE O/P EST LOW 20 MIN: CPT | Mod: 95,,, | Performed by: INTERNAL MEDICINE

## 2021-04-16 RX ORDER — PROMETHAZINE HYDROCHLORIDE 6.25 MG/5ML
6.25 SYRUP ORAL EVERY 6 HOURS PRN
Qty: 120 ML | Refills: 0 | Status: SHIPPED | OUTPATIENT
Start: 2021-04-16 | End: 2022-01-12

## 2021-04-16 RX ORDER — AZITHROMYCIN 250 MG/1
TABLET, FILM COATED ORAL
Qty: 6 TABLET | Refills: 0 | Status: SHIPPED | OUTPATIENT
Start: 2021-04-16 | End: 2021-04-20

## 2021-04-16 RX ORDER — PREDNISONE 20 MG/1
20 TABLET ORAL 2 TIMES DAILY
Qty: 10 TABLET | Refills: 0 | Status: SHIPPED | OUTPATIENT
Start: 2021-04-16 | End: 2021-04-21

## 2021-04-23 ENCOUNTER — PATIENT MESSAGE (OUTPATIENT)
Dept: ADMINISTRATIVE | Facility: OTHER | Age: 31
End: 2021-04-23

## 2021-09-09 ENCOUNTER — TELEPHONE (OUTPATIENT)
Dept: PRIMARY CARE CLINIC | Facility: CLINIC | Age: 31
End: 2021-09-09

## 2021-09-09 DIAGNOSIS — Z11.52 ENCOUNTER FOR SCREENING FOR COVID-19: Primary | ICD-10-CM

## 2021-12-14 ENCOUNTER — PATIENT MESSAGE (OUTPATIENT)
Dept: PRIMARY CARE CLINIC | Facility: CLINIC | Age: 31
End: 2021-12-14
Payer: MEDICAID

## 2021-12-14 ENCOUNTER — TELEPHONE (OUTPATIENT)
Dept: PRIMARY CARE CLINIC | Facility: CLINIC | Age: 31
End: 2021-12-14
Payer: MEDICAID

## 2021-12-16 ENCOUNTER — CLINICAL SUPPORT (OUTPATIENT)
Dept: PRIMARY CARE CLINIC | Facility: CLINIC | Age: 31
End: 2021-12-16
Payer: MEDICARE

## 2021-12-16 VITALS — SYSTOLIC BLOOD PRESSURE: 152 MMHG | DIASTOLIC BLOOD PRESSURE: 110 MMHG

## 2021-12-16 DIAGNOSIS — Z11.52 ENCOUNTER FOR SCREENING FOR COVID-19: Primary | ICD-10-CM

## 2021-12-16 PROCEDURE — 99999 PR PBB SHADOW E&M-EST. PATIENT-LVL II: CPT | Mod: PBBFAC,,,

## 2021-12-16 PROCEDURE — U0003 INFECTIOUS AGENT DETECTION BY NUCLEIC ACID (DNA OR RNA); SEVERE ACUTE RESPIRATORY SYNDROME CORONAVIRUS 2 (SARS-COV-2) (CORONAVIRUS DISEASE [COVID-19]), AMPLIFIED PROBE TECHNIQUE, MAKING USE OF HIGH THROUGHPUT TECHNOLOGIES AS DESCRIBED BY CMS-2020-01-R: HCPCS | Performed by: INTERNAL MEDICINE

## 2021-12-16 PROCEDURE — U0005 INFEC AGEN DETEC AMPLI PROBE: HCPCS | Performed by: INTERNAL MEDICINE

## 2021-12-16 PROCEDURE — 99999 PR PBB SHADOW E&M-EST. PATIENT-LVL II: ICD-10-PCS | Mod: PBBFAC,,,

## 2021-12-16 PROCEDURE — 99212 OFFICE O/P EST SF 10 MIN: CPT | Mod: PBBFAC,PN

## 2021-12-18 LAB — SARS-COV-2 RNA RESP QL NAA+PROBE: NOT DETECTED

## 2021-12-19 ENCOUNTER — TELEPHONE (OUTPATIENT)
Dept: PRIMARY CARE CLINIC | Facility: CLINIC | Age: 31
End: 2021-12-19
Payer: MEDICARE

## 2021-12-19 DIAGNOSIS — I10 HYPERTENSION, UNSPECIFIED TYPE: Primary | ICD-10-CM

## 2021-12-19 RX ORDER — AMLODIPINE AND BENAZEPRIL HYDROCHLORIDE 5; 20 MG/1; MG/1
1 CAPSULE ORAL DAILY
Qty: 90 CAPSULE | Refills: 3 | Status: SHIPPED | OUTPATIENT
Start: 2021-12-19 | End: 2022-01-12

## 2021-12-30 ENCOUNTER — CLINICAL SUPPORT (OUTPATIENT)
Dept: PRIMARY CARE CLINIC | Facility: CLINIC | Age: 31
End: 2021-12-30
Payer: MEDICARE

## 2021-12-30 VITALS — OXYGEN SATURATION: 99 % | SYSTOLIC BLOOD PRESSURE: 168 MMHG | DIASTOLIC BLOOD PRESSURE: 96 MMHG | HEART RATE: 86 BPM

## 2021-12-30 PROCEDURE — 99999 PR PBB SHADOW E&M-EST. PATIENT-LVL I: CPT | Mod: PBBFAC,,,

## 2021-12-30 PROCEDURE — 99999 PR PBB SHADOW E&M-EST. PATIENT-LVL I: ICD-10-PCS | Mod: PBBFAC,,,

## 2021-12-30 PROCEDURE — 99211 OFF/OP EST MAY X REQ PHY/QHP: CPT | Mod: PBBFAC,PN

## 2022-01-02 ENCOUNTER — TELEPHONE (OUTPATIENT)
Dept: PRIMARY CARE CLINIC | Facility: CLINIC | Age: 32
End: 2022-01-02
Payer: MEDICARE

## 2022-01-02 NOTE — TELEPHONE ENCOUNTER
Can take his current BP medication lotrel 5/20 twice a day and recheck BP in 10 days if better will get new prescription

## 2022-01-10 ENCOUNTER — PATIENT MESSAGE (OUTPATIENT)
Dept: PRIMARY CARE CLINIC | Facility: CLINIC | Age: 32
End: 2022-01-10
Payer: MEDICARE

## 2022-01-12 ENCOUNTER — OFFICE VISIT (OUTPATIENT)
Dept: PRIMARY CARE CLINIC | Facility: CLINIC | Age: 32
End: 2022-01-12
Payer: MEDICARE

## 2022-01-12 VITALS
SYSTOLIC BLOOD PRESSURE: 132 MMHG | OXYGEN SATURATION: 96 % | HEART RATE: 81 BPM | BODY MASS INDEX: 47.74 KG/M2 | DIASTOLIC BLOOD PRESSURE: 82 MMHG | WEIGHT: 315 LBS | RESPIRATION RATE: 18 BRPM | HEIGHT: 68 IN

## 2022-01-12 DIAGNOSIS — Z13.6 ENCOUNTER FOR LIPID SCREENING FOR CARDIOVASCULAR DISEASE: ICD-10-CM

## 2022-01-12 DIAGNOSIS — I10 ESSENTIAL HYPERTENSION: ICD-10-CM

## 2022-01-12 DIAGNOSIS — E11.9 TYPE 2 DIABETES MELLITUS WITHOUT COMPLICATION, WITHOUT LONG-TERM CURRENT USE OF INSULIN: ICD-10-CM

## 2022-01-12 DIAGNOSIS — E66.01 MORBID OBESITY WITH BMI OF 45.0-49.9, ADULT: ICD-10-CM

## 2022-01-12 DIAGNOSIS — Z11.59 ENCOUNTER FOR HEPATITIS C SCREENING TEST FOR LOW RISK PATIENT: ICD-10-CM

## 2022-01-12 DIAGNOSIS — I10 HYPERTENSION, UNSPECIFIED TYPE: ICD-10-CM

## 2022-01-12 DIAGNOSIS — M10.9 GOUTY ARTHRITIS OF BOTH ANKLES: ICD-10-CM

## 2022-01-12 DIAGNOSIS — Z13.220 ENCOUNTER FOR LIPID SCREENING FOR CARDIOVASCULAR DISEASE: ICD-10-CM

## 2022-01-12 DIAGNOSIS — Z11.4 ENCOUNTER FOR SCREENING FOR HIV: ICD-10-CM

## 2022-01-12 DIAGNOSIS — Z23 NEED FOR VACCINATION: Primary | ICD-10-CM

## 2022-01-12 PROCEDURE — 99999 PR PBB SHADOW E&M-EST. PATIENT-LVL V: CPT | Mod: PBBFAC,,, | Performed by: INTERNAL MEDICINE

## 2022-01-12 PROCEDURE — 99999 PR PBB SHADOW E&M-EST. PATIENT-LVL V: ICD-10-PCS | Mod: PBBFAC,,, | Performed by: INTERNAL MEDICINE

## 2022-01-12 PROCEDURE — 99215 OFFICE O/P EST HI 40 MIN: CPT | Mod: PBBFAC,PN,25 | Performed by: INTERNAL MEDICINE

## 2022-01-12 PROCEDURE — 99214 OFFICE O/P EST MOD 30 MIN: CPT | Mod: S$PBB,,, | Performed by: INTERNAL MEDICINE

## 2022-01-12 PROCEDURE — G0009 ADMIN PNEUMOCOCCAL VACCINE: HCPCS | Mod: PBBFAC,PN

## 2022-01-12 PROCEDURE — 99214 PR OFFICE/OUTPT VISIT, EST, LEVL IV, 30-39 MIN: ICD-10-PCS | Mod: S$PBB,,, | Performed by: INTERNAL MEDICINE

## 2022-01-12 RX ORDER — INDOMETHACIN 75 MG/1
75 CAPSULE, EXTENDED RELEASE ORAL 2 TIMES DAILY
Qty: 40 CAPSULE | Refills: 2 | Status: SHIPPED | OUTPATIENT
Start: 2022-01-12 | End: 2022-05-12 | Stop reason: SDUPTHER

## 2022-01-12 RX ORDER — TRAZODONE HYDROCHLORIDE 100 MG/1
100-200 TABLET ORAL NIGHTLY PRN
COMMUNITY
Start: 2021-10-27

## 2022-01-12 RX ORDER — DICLOFENAC SODIUM 10 MG/G
GEL TOPICAL
COMMUNITY
Start: 2022-01-05 | End: 2022-01-12

## 2022-01-12 RX ORDER — GABAPENTIN 300 MG/1
300 CAPSULE ORAL 3 TIMES DAILY
COMMUNITY
Start: 2021-09-23

## 2022-01-12 RX ORDER — METFORMIN HYDROCHLORIDE 500 MG/1
500 TABLET ORAL
COMMUNITY

## 2022-01-12 RX ORDER — CLOBETASOL PROPIONATE 0.46 MG/ML
SOLUTION TOPICAL
COMMUNITY
Start: 2021-09-10

## 2022-01-12 RX ORDER — AMLODIPINE AND BENAZEPRIL HYDROCHLORIDE 5; 20 MG/1; MG/1
1 CAPSULE ORAL 2 TIMES DAILY
Qty: 180 CAPSULE | Refills: 1 | Status: SHIPPED | OUTPATIENT
Start: 2022-01-12

## 2022-01-12 RX ORDER — ERGOCALCIFEROL 1.25 MG/1
50000 CAPSULE ORAL
COMMUNITY
Start: 2021-11-23 | End: 2022-09-01 | Stop reason: SDUPTHER

## 2022-01-12 RX ORDER — BICTEGRAVIR SODIUM, EMTRICITABINE, AND TENOFOVIR ALAFENAMIDE FUMARATE 50; 200; 25 MG/1; MG/1; MG/1
TABLET ORAL DAILY
COMMUNITY
Start: 2022-01-05

## 2022-01-12 RX ORDER — HYDROCORTISONE AND ACETIC ACID 20.75; 10.375 MG/ML; MG/ML
SOLUTION AURICULAR (OTIC)
COMMUNITY
Start: 2022-01-05 | End: 2022-05-13

## 2022-01-12 RX ORDER — ESCITALOPRAM OXALATE 20 MG/1
20 TABLET ORAL DAILY
COMMUNITY
Start: 2021-09-23

## 2022-01-12 RX ORDER — IMIQUIMOD 12.5 MG/.25G
CREAM TOPICAL
COMMUNITY
Start: 2021-11-23

## 2022-01-12 NOTE — PROGRESS NOTES
Subjective:       Patient ID: Mickey D Jeansonne is a 31 y.o. male.    Chief Complaint: Follow-up and Hypertension    HPI  patient visit today for follow-up he has history of diabetes mellitus hypertension gouty arthritis peripheral edema and morbid obesity patient visit today with complaint of his left ankle swell up and painful sometime from gout arthritis he does not have any medication to take p.r.n. patient is working on his feet all day long he denies short of breath chest pain dyspnea with exertion his high blood pressure fairly control he has been trying to lose weight with diet exercise without success  Review of Systems   Constitutional: Negative for unexpected weight change.   HENT: Negative for congestion and postnasal drip.    Eyes: Negative for visual disturbance.   Respiratory: Negative for shortness of breath.    Cardiovascular: Negative for chest pain.   Gastrointestinal: Negative for abdominal distention.   Musculoskeletal: Negative for arthralgias.   Psychiatric/Behavioral: Negative for dysphoric mood.       Objective:      Physical Exam  Vitals and nursing note reviewed.   Constitutional:       General: He is not in acute distress.     Appearance: He is well-developed and well-nourished. He is obese.   HENT:      Head: Normocephalic and atraumatic.      Right Ear: External ear normal.      Left Ear: External ear normal.      Nose: Nose normal.      Mouth/Throat:      Mouth: Oropharynx is clear and moist.      Pharynx: No oropharyngeal exudate.   Eyes:      Extraocular Movements: Extraocular movements intact and EOM normal.      Conjunctiva/sclera: Conjunctivae normal.      Pupils: Pupils are equal, round, and reactive to light.   Neck:      Thyroid: No thyromegaly.   Cardiovascular:      Rate and Rhythm: Normal rate and regular rhythm.      Pulses: Intact distal pulses.      Heart sounds: Normal heart sounds. No murmur heard.  No friction rub. No gallop.    Pulmonary:      Effort: Pulmonary  effort is normal. No respiratory distress.      Breath sounds: Normal breath sounds. No wheezing or rales.   Abdominal:      General: Bowel sounds are normal. There is no distension.      Palpations: Abdomen is soft.      Tenderness: There is no abdominal tenderness. There is no guarding.   Musculoskeletal:         General: Tenderness (tenderness in left ankle and dorsum left foot) present. No deformity or edema. Normal range of motion.      Cervical back: Normal range of motion and neck supple.   Lymphadenopathy:      Cervical: No cervical adenopathy.   Skin:     General: Skin is warm and dry.      Capillary Refill: Capillary refill takes less than 2 seconds.      Findings: No erythema or rash.      Comments: Plus 1-2 edema in ankles bilaterally   Neurological:      Mental Status: He is alert and oriented to person, place, and time.   Psychiatric:         Mood and Affect: Mood and affect normal.         Thought Content: Thought content normal.         Judgment: Judgment normal.         Assessment:       1. Need for vaccination    2. Encounter for screening for HIV    3. Encounter for hepatitis C screening test for low risk patient    4. Essential hypertension    5. Hypertension, unspecified type    6. Morbid obesity with BMI of 45.0-49.9, adult    7. Type 2 diabetes mellitus without complication, without long-term current use of insulin    8. Encounter for lipid screening for cardiovascular disease    9. Gouty arthritis of both ankles        Plan:       Need for vaccination  -     (In Office Administered) Pneumococcal Conjugate Vaccine (13 Valent) (IM)    Encounter for screening for HIV  -     HIV 1/2 Ag/Ab (4th Gen); Future; Expected date: 01/12/2022    Encounter for hepatitis C screening test for low risk patient  -     Hepatitis C Antibody; Future; Expected date: 01/12/2022    Essential hypertension  -     amlodipine-benazepril 5-20 mg (LOTREL) 5-20 mg per capsule; Take 1 capsule by mouth 2 (two) times a day.   Dispense: 180 capsule; Refill: 1  -     CBC Auto Differential; Future; Expected date: 01/12/2022  -     Comprehensive Metabolic Panel; Future; Expected date: 01/12/2022    Hypertension, unspecified type  -     Ambulatory referral/consult to Weight Management Program; Future; Expected date: 01/19/2022  -     Ambulatory referral/consult to Weight Management Program; Future; Expected date: 01/13/2022    Morbid obesity with BMI of 45.0-49.9, adult  -     Ambulatory referral/consult to Weight Management Program; Future; Expected date: 01/19/2022  -     Ambulatory referral/consult to Weight Management Program; Future; Expected date: 01/13/2022    Type 2 diabetes mellitus without complication, without long-term current use of insulin  -     Hemoglobin A1C; Future; Expected date: 01/12/2022  -     Microalbumin/Creatinine Ratio, Urine; Future; Expected date: 01/12/2022  -     Urinalysis; Future; Expected date: 01/12/2022  -     Ambulatory referral/consult to Weight Management Program; Future; Expected date: 01/19/2022  -     Ambulatory referral/consult to Weight Management Program; Future; Expected date: 01/13/2022    Encounter for lipid screening for cardiovascular disease  -     Lipid Panel; Future; Expected date: 01/12/2022    Gouty arthritis of both ankles  -     Uric Acid; Future; Expected date: 01/12/2022  -     indomethacin (INDOCIN SR) 75 mg CpSR CR capsule; Take 1 capsule (75 mg total) by mouth 2 (two) times daily. 1 po BID for gout  Dispense: 40 capsule; Refill: 2        Medication List with Changes/Refills   New Medications    AMLODIPINE-BENAZEPRIL 5-20 MG (LOTREL) 5-20 MG PER CAPSULE    Take 1 capsule by mouth 2 (two) times a day.    INDOMETHACIN (INDOCIN SR) 75 MG CPSR CR CAPSULE    Take 1 capsule (75 mg total) by mouth 2 (two) times daily. 1 po BID for gout   Current Medications    ACETIC ACID-HYDROCORTISONE (VOSOL-HC) OTIC SOLUTION        ALLOPURINOL (ZYLOPRIM) 100 MG TABLET    Take 1 tablet (100 mg total) by  mouth once daily.    BIKTARVY -25 MG PER TABLET    once daily.    BLOOD PRESSURE MONITOR ([facility] EASE) XL ARM SIZE (OP)    by Other route as needed for High Blood Pressure.    CLOBETASOL (TEMOVATE) 0.05 % EXTERNAL SOLUTION    SMARTSIG:Topical Morning-Evening    ERGOCALCIFEROL (ERGOCALCIFEROL) 50,000 UNIT CAP    Take 50,000 Units by mouth every 7 days.    ESCITALOPRAM OXALATE (LEXAPRO) 20 MG TABLET    Take 20 mg by mouth once daily.    FUROSEMIDE (LASIX) 40 MG TABLET    Take 1 tablet (40 mg total) by mouth 2 (two) times daily.    GABAPENTIN (NEURONTIN) 300 MG CAPSULE    Take 300 mg by mouth 3 (three) times daily.    HYDROCODONE-ACETAMINOPHEN (NORCO) 5-325 MG PER TABLET    One p.o. q.6 hours p.r.n. pain    IMIQUIMOD (ALDARA) 5 % CREAM    Apply topically.    METFORMIN (GLUCOPHAGE) 500 MG TABLET    Take 500 mg by mouth.    TRAMADOL (ULTRAM) 50 MG TABLET    Take 1 tablet (50 mg total) by mouth every 4 (four) hours as needed for Pain.    TRAZODONE (DESYREL) 100 MG TABLET    Take 100-200 mg by mouth nightly as needed.   Discontinued Medications    AMLODIPINE-BENAZEPRIL 5-20 MG (LOTREL) 5-20 MG PER CAPSULE    Take 1 capsule by mouth once daily.    COLCHICINE (COLCRYS) 0.6 MG TABLET    Take 1 tablet (0.6 mg total) by mouth 2 (two) times daily as needed (pain).    DICLOFENAC SODIUM (VOLTAREN) 1 % GEL    APPLY 2 GRAMS TO THE AFFECTED AREA(S) BY TOPICAL ROUTE 4 TIMES PER DAY    PROMETHAZINE (PHENERGAN) 6.25 MG/5 ML SYRUP    Take 5 mLs (6.25 mg total) by mouth every 6 (six) hours as needed (coughing).

## 2022-01-17 DIAGNOSIS — E78.5 HYPERLIPIDEMIA, UNSPECIFIED HYPERLIPIDEMIA TYPE: ICD-10-CM

## 2022-01-17 DIAGNOSIS — M10.9 GOUTY ARTHRITIS OF BOTH ANKLES: Primary | ICD-10-CM

## 2022-01-17 RX ORDER — ATORVASTATIN CALCIUM 40 MG/1
40 TABLET, FILM COATED ORAL DAILY
Qty: 90 TABLET | Refills: 3 | Status: SHIPPED | OUTPATIENT
Start: 2022-01-17 | End: 2022-07-08

## 2022-01-17 RX ORDER — ALLOPURINOL 300 MG/1
300 TABLET ORAL DAILY
Qty: 90 TABLET | Refills: 3 | Status: SHIPPED | OUTPATIENT
Start: 2022-01-17

## 2022-01-21 ENCOUNTER — PATIENT MESSAGE (OUTPATIENT)
Dept: ADMINISTRATIVE | Facility: OTHER | Age: 32
End: 2022-01-21
Payer: MEDICARE

## 2022-01-28 NOTE — TELEPHONE ENCOUNTER
----- Message from Kenzie Choi sent at 1/28/2022  1:38 PM CST -----  Contact: Pt Mobile/Home [phone]  Patient is returning a phone call.  Who left a message for the patient:   Does patient know what this is regarding:    Would you like a call back, or a response through your [facility] portal?:   Phone   Comments:  Patient said he received a call on today.

## 2022-01-28 NOTE — TELEPHONE ENCOUNTER
Called pt regarding results, pt verbalized understanding While on the phone - pt. Asking for a z-wilber for his sinuses

## 2022-01-29 RX ORDER — AZITHROMYCIN 250 MG/1
TABLET, FILM COATED ORAL
Qty: 6 TABLET | Refills: 0 | Status: SHIPPED | OUTPATIENT
Start: 2022-01-29 | End: 2022-02-02

## 2022-02-04 ENCOUNTER — TELEPHONE (OUTPATIENT)
Dept: BARIATRICS | Facility: CLINIC | Age: 32
End: 2022-02-04
Payer: MEDICARE

## 2022-02-07 ENCOUNTER — TELEPHONE (OUTPATIENT)
Dept: BARIATRICS | Facility: CLINIC | Age: 32
End: 2022-02-07
Payer: MEDICARE

## 2022-02-07 NOTE — TELEPHONE ENCOUNTER
Received message from Hamida Castro, , that patient was returning call to schedule bariatric consult.  Called patient.  He stated that since his financial appointment, that he now has Medicaid insurance in addition to his Medicare and wanted to know if it would  what Medicare did not pay.  Informed patient that the  would need to reverify benefits under his Medicaid and she would call him back to discuss coverage.  Patient stated he wanted to wait until after he speaks to the  again, before scheduling his consultation (as he could not pay his Medicare portion if Medicaid did not pick it up).  Number to clinic given to patient to call back to schedule consultation.

## 2022-02-11 ENCOUNTER — OFFICE VISIT (OUTPATIENT)
Dept: SURGERY | Facility: CLINIC | Age: 32
End: 2022-02-11
Payer: MEDICARE

## 2022-02-11 VITALS
RESPIRATION RATE: 18 BRPM | DIASTOLIC BLOOD PRESSURE: 91 MMHG | BODY MASS INDEX: 47.74 KG/M2 | HEART RATE: 112 BPM | SYSTOLIC BLOOD PRESSURE: 152 MMHG | HEIGHT: 68 IN | WEIGHT: 315 LBS

## 2022-02-11 DIAGNOSIS — E66.01 MORBID OBESITY: Primary | ICD-10-CM

## 2022-02-11 DIAGNOSIS — E11.69 TYPE 2 DIABETES MELLITUS WITH OTHER SPECIFIED COMPLICATION, UNSPECIFIED WHETHER LONG TERM INSULIN USE: ICD-10-CM

## 2022-02-11 PROCEDURE — 99999 PR PBB SHADOW E&M-EST. PATIENT-LVL V: CPT | Mod: PBBFAC,,, | Performed by: SURGERY

## 2022-02-11 PROCEDURE — 99215 OFFICE O/P EST HI 40 MIN: CPT | Mod: PBBFAC,PN | Performed by: SURGERY

## 2022-02-11 PROCEDURE — 99205 OFFICE O/P NEW HI 60 MIN: CPT | Mod: S$PBB,,, | Performed by: SURGERY

## 2022-02-11 PROCEDURE — 99205 PR OFFICE/OUTPT VISIT, NEW, LEVL V, 60-74 MIN: ICD-10-PCS | Mod: S$PBB,,, | Performed by: SURGERY

## 2022-02-11 PROCEDURE — 99999 PR PBB SHADOW E&M-EST. PATIENT-LVL V: ICD-10-PCS | Mod: PBBFAC,,, | Performed by: SURGERY

## 2022-02-11 RX ORDER — AMOXICILLIN 500 MG/1
500 CAPSULE ORAL
COMMUNITY
Start: 2022-02-09 | End: 2022-02-19

## 2022-02-17 NOTE — PROGRESS NOTES
"BARIATRIC NEW PATIENT EVALUATION    CHIEF COMPLAINT:   Morbid obesity, Body mass index is 62.6 kg/m². and inability to lose weight.    HPI:  Mickey D Jeansonne is a 31 y.o. male presenting for morbid obesity, Body mass index is 62.6 kg/m². and inability to lose weight. The patient has tried multiple diets and exercise regimens.  Has been unsuccessful in keeping any significant amount of weight off.  History at low-calorie and low carb diets.  His than walking and light jogging.        PAST MEDICAL HISTORY:  Past Medical History:   Diagnosis Date    Edema     Gout     Hypertension          PAST SURGICAL HISTORY:  No past surgical history on file.    FAMILY HISTORY:  No family history on file.       SOCIAL HISTORY:  Social History     Social History Narrative    Not on file         MEDICATIONS:  Medications have been reviewed.    ALLERGIES:  Allergies have been reviewed.    Review of Systems   Constitutional: Negative for chills, fever and weight loss.   HENT: Negative for hearing loss.    Eyes: Negative for blurred vision and discharge.   Respiratory: Negative for cough and shortness of breath.    Cardiovascular: Negative for chest pain and claudication.   Gastrointestinal: Negative for abdominal pain, constipation, diarrhea and vomiting.   Genitourinary: Negative for dysuria and hematuria.   Musculoskeletal: Positive for back pain and myalgias.   Skin: Negative for rash.   Neurological: Negative for dizziness.   Endo/Heme/Allergies: Does not bruise/bleed easily.   Psychiatric/Behavioral: Negative for depression and suicidal ideas. The patient does not have insomnia.    All other systems reviewed and are negative.      Vitals:    02/11/22 1046   BP: (!) 152/91   Pulse: (!) 112   Resp: 18   Weight: (!) 186.7 kg (411 lb 11.3 oz)   Height: 5' 8" (1.727 m)   PainSc: 0-No pain       Physical Exam  Vitals and nursing note reviewed.   Constitutional:       Appearance: Normal appearance. He is obese.   HENT:      Head: " Normocephalic and atraumatic.      Nose: Nose normal.      Mouth/Throat:      Mouth: Mucous membranes are moist.   Eyes:      Pupils: Pupils are equal, round, and reactive to light.   Cardiovascular:      Rate and Rhythm: Normal rate.      Pulses: Normal pulses.   Pulmonary:      Effort: Pulmonary effort is normal.   Abdominal:      General: Abdomen is flat.   Musculoskeletal:      Cervical back: Normal range of motion.   Neurological:      Mental Status: He is alert.         DIAGNOSIS:  1. Morbid obesity, Body mass index is 62.6 kg/m². and inability to lose weight.  2. Co-morbidities: obstructive sleep apnea, diabetes  Hypertension, gout, arthritis    PLAN:  The patient is a potential candidate for Bariatric Surgery. The patient is interested in sleeve gastrectomy. The surgery and post-op care was discussed in detail with the patient. All questions were answered.    The patient understands that bariatric surgery is a tool to aid in weight loss and that they need to be committed to the diet and exercise post-operatively for successful weight loss. Discussed with patient that bariatric surgery is not the easy way out and that it will take plenty of dedication on the patient's part to be successful. Also discussed the possibility of weight regain if the patient strays from the diet guidelines or exercise requirements. Patient verbalized understanding and wishes to proceed with the work-up.    Estimated Goal weight is 280 lbs, approximately 50% of their excess weight.      ORDERS:  1. Chest X-Ray and Upper GI  2. Psychological Consult, Bariatric Dietician Consult, Cardiac Clearance, Pulmonology Clearance and PCP Clearance  3. Bariatric Labs: BMP, CBC, Folate Serum, H. pylori, HgA1C, Hepatic Panel/LFT, Iron & TIBC, Lipid Profile, Magnesium, Phosphate, T3, T4, TSH, Free T4, Vitamin B12, and Vitamin B1.    Referring Physician: Santiago Chaparro MD  RTC: As scheduled.

## 2022-03-02 ENCOUNTER — PATIENT OUTREACH (OUTPATIENT)
Dept: ADMINISTRATIVE | Facility: OTHER | Age: 32
End: 2022-03-02
Payer: MEDICARE

## 2022-03-02 DIAGNOSIS — E11.69 TYPE 2 DIABETES MELLITUS WITH OTHER SPECIFIED COMPLICATION, UNSPECIFIED WHETHER LONG TERM INSULIN USE: Primary | ICD-10-CM

## 2022-03-03 NOTE — PROGRESS NOTES
LINKS immunization registry updated  Care Everywhere updated  Health Maintenance updated  Chart reviewed for overdue Proactive [facility] Encounters ([facility]) health maintenance testing (CRS, Breast Ca, Diabetic Eye Exam)   Orders entered: diabetic eye screening photo

## 2022-03-09 ENCOUNTER — TELEPHONE (OUTPATIENT)
Dept: PULMONOLOGY | Facility: CLINIC | Age: 32
End: 2022-03-09
Payer: MEDICARE

## 2022-03-09 NOTE — TELEPHONE ENCOUNTER
Left voicemail asking for a call back, offering to assist with rescheduling cardiology appointment.

## 2022-03-09 NOTE — TELEPHONE ENCOUNTER
----- Message from Jerrica Ricketts, Patient Care Assistant sent at 3/9/2022 11:46 AM CST -----  Regarding: appt reschedule  Contact: Pt  Pt is requesting a call back in regards to rescheduling his appt. Pt states he doesn't have transportation to make it to this appt.        Pt @ [phone]

## 2022-03-17 ENCOUNTER — TELEPHONE (OUTPATIENT)
Dept: ADMINISTRATIVE | Facility: HOSPITAL | Age: 32
End: 2022-03-17
Payer: MEDICARE

## 2022-04-12 ENCOUNTER — OFFICE VISIT (OUTPATIENT)
Dept: PRIMARY CARE CLINIC | Facility: CLINIC | Age: 32
End: 2022-04-12
Payer: MEDICARE

## 2022-04-12 VITALS
BODY MASS INDEX: 47.74 KG/M2 | HEART RATE: 109 BPM | HEIGHT: 68 IN | SYSTOLIC BLOOD PRESSURE: 136 MMHG | RESPIRATION RATE: 18 BRPM | DIASTOLIC BLOOD PRESSURE: 84 MMHG | OXYGEN SATURATION: 95 % | WEIGHT: 315 LBS

## 2022-04-12 DIAGNOSIS — L01.00 IMPETIGO: Primary | ICD-10-CM

## 2022-04-12 PROCEDURE — 99213 OFFICE O/P EST LOW 20 MIN: CPT | Mod: S$PBB,,, | Performed by: INTERNAL MEDICINE

## 2022-04-12 PROCEDURE — 99213 PR OFFICE/OUTPT VISIT, EST, LEVL III, 20-29 MIN: ICD-10-PCS | Mod: S$PBB,,, | Performed by: INTERNAL MEDICINE

## 2022-04-12 PROCEDURE — 99999 PR PBB SHADOW E&M-EST. PATIENT-LVL V: ICD-10-PCS | Mod: PBBFAC,,, | Performed by: INTERNAL MEDICINE

## 2022-04-12 PROCEDURE — 99215 OFFICE O/P EST HI 40 MIN: CPT | Mod: PBBFAC,PN | Performed by: INTERNAL MEDICINE

## 2022-04-12 PROCEDURE — 99999 PR PBB SHADOW E&M-EST. PATIENT-LVL V: CPT | Mod: PBBFAC,,, | Performed by: INTERNAL MEDICINE

## 2022-04-12 RX ORDER — DOXYCYCLINE 100 MG/1
100 CAPSULE ORAL EVERY 12 HOURS
Qty: 20 CAPSULE | Refills: 0 | Status: SHIPPED | OUTPATIENT
Start: 2022-04-12 | End: 2022-04-22

## 2022-04-12 RX ORDER — DEXAMETHASONE 4 MG/1
4 TABLET ORAL EVERY 12 HOURS
Qty: 10 TABLET | Refills: 0 | Status: SHIPPED | OUTPATIENT
Start: 2022-04-12 | End: 2022-06-01

## 2022-04-12 RX ORDER — CHLORHEXIDINE GLUCONATE 40 MG/ML
SOLUTION TOPICAL DAILY PRN
Qty: 236 ML | Refills: 0 | Status: SHIPPED | OUTPATIENT
Start: 2022-04-12 | End: 2022-05-12

## 2022-04-12 RX ORDER — MUPIROCIN 20 MG/G
OINTMENT TOPICAL 2 TIMES DAILY
Qty: 22 G | Refills: 0 | Status: SHIPPED | OUTPATIENT
Start: 2022-04-12 | End: 2022-05-12

## 2022-04-12 NOTE — PROGRESS NOTES
Subjective:       Patient ID: Mickey D Jeansonne is a 31 y.o. male.    Chief Complaint: Follow-up (3 mth/)    HPI  Pt visit today for routine f/u no physical c/o no sob cp JEAN he has few skin sleions in extremities itchy skin sores with erythematous bases no abscess no exudate no n/v/d pt is trying to loose wt  Review of Systems    Objective:      Physical Exam  Vitals and nursing note reviewed.   Constitutional:       General: He is not in acute distress.     Appearance: He is well-developed. He is obese.   HENT:      Head: Normocephalic and atraumatic.      Right Ear: External ear normal.      Left Ear: External ear normal.      Nose: Nose normal. No rhinorrhea.      Mouth/Throat:      Pharynx: No oropharyngeal exudate or posterior oropharyngeal erythema.   Eyes:      General:         Right eye: No discharge.         Left eye: No discharge.      Conjunctiva/sclera: Conjunctivae normal.      Pupils: Pupils are equal, round, and reactive to light.   Neck:      Thyroid: No thyromegaly.   Cardiovascular:      Rate and Rhythm: Normal rate and regular rhythm.      Heart sounds: Normal heart sounds. No murmur heard.    No friction rub. No gallop.   Pulmonary:      Effort: Pulmonary effort is normal. No respiratory distress.      Breath sounds: Normal breath sounds. No wheezing or rales.   Chest:      Chest wall: No tenderness.   Abdominal:      General: Bowel sounds are normal. There is no distension.      Palpations: Abdomen is soft.      Tenderness: There is no abdominal tenderness. There is no guarding or rebound.   Musculoskeletal:         General: No tenderness or deformity. Normal range of motion.      Cervical back: Normal range of motion and neck supple.   Lymphadenopathy:      Cervical: No cervical adenopathy.   Skin:     General: Skin is warm and dry.      Findings: No erythema or rash.      Comments: si with few skin sores in upper and lower ext    Neurological:      Mental Status: He is alert and oriented to  person, place, and time.   Psychiatric:         Thought Content: Thought content normal.         Judgment: Judgment normal.         Assessment:       1. Impetigo        Plan:       Impetigo  -     chlorhexidine (HIBICLENS) 4 % external liquid; Apply topically daily as needed (skin infection).  Dispense: 236 mL; Refill: 0  -     dexAMETHasone (DECADRON) 4 MG Tab; Take 1 tablet (4 mg total) by mouth every 12 (twelve) hours.  Dispense: 10 tablet; Refill: 0  -     doxycycline (VIBRAMYCIN) 100 MG Cap; Take 1 capsule (100 mg total) by mouth every 12 (twelve) hours. for 10 days  Dispense: 20 capsule; Refill: 0  -     mupirocin (BACTROBAN) 2 % ointment; Apply topically 2 (two) times daily.  Dispense: 22 g; Refill: 0        Medication List with Changes/Refills   New Medications    CHLORHEXIDINE (HIBICLENS) 4 % EXTERNAL LIQUID    Apply topically daily as needed (skin infection).    DEXAMETHASONE (DECADRON) 4 MG TAB    Take 1 tablet (4 mg total) by mouth every 12 (twelve) hours.    DOXYCYCLINE (VIBRAMYCIN) 100 MG CAP    Take 1 capsule (100 mg total) by mouth every 12 (twelve) hours. for 10 days    MUPIROCIN (BACTROBAN) 2 % OINTMENT    Apply topically 2 (two) times daily.   Current Medications    ACETIC ACID-HYDROCORTISONE (VOSOL-HC) OTIC SOLUTION        ALLOPURINOL (ZYLOPRIM) 300 MG TABLET    Take 1 tablet (300 mg total) by mouth once daily.    AMLODIPINE-BENAZEPRIL 5-20 MG (LOTREL) 5-20 MG PER CAPSULE    Take 1 capsule by mouth 2 (two) times a day.    ATORVASTATIN (LIPITOR) 40 MG TABLET    Take 1 tablet (40 mg total) by mouth once daily.    BIKTARVY -25 MG PER TABLET    once daily.    BLOOD PRESSURE MONITOR ([facility] EASE) XL ARM SIZE (OP)    by Other route as needed for High Blood Pressure.    CLOBETASOL (TEMOVATE) 0.05 % EXTERNAL SOLUTION    SMARTSIG:Topical Morning-Evening    ERGOCALCIFEROL (ERGOCALCIFEROL) 50,000 UNIT CAP    Take 50,000 Units by mouth every 7 days.    ESCITALOPRAM OXALATE (LEXAPRO) 20 MG TABLET     Take 20 mg by mouth once daily.    FUROSEMIDE (LASIX) 40 MG TABLET    Take 1 tablet (40 mg total) by mouth 2 (two) times daily.    GABAPENTIN (NEURONTIN) 300 MG CAPSULE    Take 300 mg by mouth 3 (three) times daily.    HYDROCODONE-ACETAMINOPHEN (NORCO) 5-325 MG PER TABLET    One p.o. q.6 hours p.r.n. pain    IMIQUIMOD (ALDARA) 5 % CREAM    Apply topically.    INDOMETHACIN (INDOCIN SR) 75 MG CPSR CR CAPSULE    Take 1 capsule (75 mg total) by mouth 2 (two) times daily. 1 po BID for gout    METFORMIN (GLUCOPHAGE) 500 MG TABLET    Take 500 mg by mouth.    TRAMADOL (ULTRAM) 50 MG TABLET    Take 1 tablet (50 mg total) by mouth every 4 (four) hours as needed for Pain.    TRAZODONE (DESYREL) 100 MG TABLET    Take 100-200 mg by mouth nightly as needed.

## 2022-05-12 ENCOUNTER — OFFICE VISIT (OUTPATIENT)
Dept: PRIMARY CARE CLINIC | Facility: CLINIC | Age: 32
End: 2022-05-12
Payer: MEDICARE

## 2022-05-12 VITALS
SYSTOLIC BLOOD PRESSURE: 124 MMHG | HEART RATE: 112 BPM | OXYGEN SATURATION: 96 % | HEIGHT: 68 IN | RESPIRATION RATE: 18 BRPM | WEIGHT: 315 LBS | BODY MASS INDEX: 47.74 KG/M2 | DIASTOLIC BLOOD PRESSURE: 82 MMHG

## 2022-05-12 DIAGNOSIS — I10 ESSENTIAL HYPERTENSION: ICD-10-CM

## 2022-05-12 DIAGNOSIS — E11.9 TYPE 2 DIABETES MELLITUS WITHOUT COMPLICATION, WITHOUT LONG-TERM CURRENT USE OF INSULIN: ICD-10-CM

## 2022-05-12 DIAGNOSIS — E11.9 ENCOUNTER FOR DIABETIC FOOT EXAM: ICD-10-CM

## 2022-05-12 DIAGNOSIS — E66.01 MORBID OBESITY WITH BMI OF 45.0-49.9, ADULT: ICD-10-CM

## 2022-05-12 DIAGNOSIS — L01.00 IMPETIGO: Primary | ICD-10-CM

## 2022-05-12 DIAGNOSIS — H66.90 EAR INFECTION: Primary | ICD-10-CM

## 2022-05-12 DIAGNOSIS — M10.9 GOUTY ARTHRITIS OF BOTH ANKLES: ICD-10-CM

## 2022-05-12 DIAGNOSIS — L01.00 IMPETIGO: ICD-10-CM

## 2022-05-12 DIAGNOSIS — M25.572 ACUTE LEFT ANKLE PAIN: ICD-10-CM

## 2022-05-12 PROCEDURE — 99213 OFFICE O/P EST LOW 20 MIN: CPT | Mod: S$PBB,,, | Performed by: INTERNAL MEDICINE

## 2022-05-12 PROCEDURE — 99215 OFFICE O/P EST HI 40 MIN: CPT | Mod: PBBFAC,PN | Performed by: INTERNAL MEDICINE

## 2022-05-12 PROCEDURE — 99999 PR PBB SHADOW E&M-EST. PATIENT-LVL V: CPT | Mod: PBBFAC,,, | Performed by: INTERNAL MEDICINE

## 2022-05-12 PROCEDURE — 99999 PR PBB SHADOW E&M-EST. PATIENT-LVL V: ICD-10-PCS | Mod: PBBFAC,,, | Performed by: INTERNAL MEDICINE

## 2022-05-12 PROCEDURE — 99213 PR OFFICE/OUTPT VISIT, EST, LEVL III, 20-29 MIN: ICD-10-PCS | Mod: S$PBB,,, | Performed by: INTERNAL MEDICINE

## 2022-05-12 RX ORDER — INDOMETHACIN 75 MG/1
75 CAPSULE, EXTENDED RELEASE ORAL 2 TIMES DAILY
Qty: 40 CAPSULE | Refills: 0 | Status: SHIPPED | OUTPATIENT
Start: 2022-05-12 | End: 2022-12-13 | Stop reason: SDUPTHER

## 2022-05-12 RX ORDER — MUPIROCIN 20 MG/G
OINTMENT TOPICAL 2 TIMES DAILY
Qty: 22 G | Refills: 0 | Status: SHIPPED | OUTPATIENT
Start: 2022-05-12 | End: 2023-03-07

## 2022-05-12 RX ORDER — CHLORHEXIDINE GLUCONATE 40 MG/ML
SOLUTION TOPICAL DAILY PRN
Qty: 236 ML | Refills: 1 | Status: SHIPPED | OUTPATIENT
Start: 2022-05-12

## 2022-05-12 RX ORDER — CIPROFLOXACIN AND DEXAMETHASONE 3; 1 MG/ML; MG/ML
4 SUSPENSION/ DROPS AURICULAR (OTIC) 2 TIMES DAILY
Qty: 7.5 ML | Refills: 0 | Status: SHIPPED | OUTPATIENT
Start: 2022-05-12 | End: 2022-05-12 | Stop reason: CLARIF

## 2022-05-12 NOTE — PROGRESS NOTES
Subjective:       Patient ID: Mickey D Jeansonne is a 31 y.o. male.    Chief Complaint: Follow-up (4 wk), Sores (Bilateral Ear), and Leg Pain (Left)    HPI Pt visit today with with c/o skin sores in the ears and c/o rt ankle rt leg pain he ha sh/o gouty arthritis no other joint pain . Pt try get bariatri surgery but can't afford 1400$ copayment pt with morbid obesity and comorbilities  Review of Systems    Objective:      Physical Exam  Vitals and nursing note reviewed.   Constitutional:       General: He is not in acute distress.     Appearance: He is well-developed. He is obese.   HENT:      Head: Normocephalic and atraumatic.      Right Ear: External ear normal.      Left Ear: External ear normal.      Nose: Nose normal.   Eyes:      Conjunctiva/sclera: Conjunctivae normal.      Pupils: Pupils are equal, round, and reactive to light.   Neck:      Thyroid: No thyromegaly.   Cardiovascular:      Rate and Rhythm: Normal rate and regular rhythm.      Pulses:           Dorsalis pedis pulses are 2+ on the right side and 2+ on the left side.        Posterior tibial pulses are 2+ on the right side and 2+ on the left side.      Heart sounds: Normal heart sounds. No murmur heard.    No friction rub. No gallop.   Pulmonary:      Effort: Pulmonary effort is normal. No respiratory distress.      Breath sounds: Normal breath sounds. No wheezing or rales.   Abdominal:      General: Bowel sounds are normal. There is no distension.      Palpations: Abdomen is soft.      Tenderness: There is no abdominal tenderness. There is no guarding.   Musculoskeletal:         General: Tenderness (left ankle sl swelling and tenderness wit palpation and ROM) present. No deformity. Normal range of motion.      Cervical back: Normal range of motion and neck supple.   Feet:      Right foot:      Protective Sensation: 4 sites tested. 4 sites sensed.      Skin integrity: Skin integrity normal.      Toenail Condition: Right toenails are normal.       Left foot:      Protective Sensation: 4 sites tested. 4 sites sensed.      Skin integrity: Skin integrity normal.      Toenail Condition: Left toenails are normal.   Lymphadenopathy:      Cervical: No cervical adenopathy.   Skin:     General: Skin is warm and dry.      Findings: No erythema or rash.      Comments: Ski with small impetigo like lesions in the aurical bilaterally   Neurological:      Mental Status: He is alert and oriented to person, place, and time.   Psychiatric:         Mood and Affect: Mood normal.         Thought Content: Thought content normal.         Judgment: Judgment normal.         Assessment:       1. Impetigo    2. Type 2 diabetes mellitus without complication, without long-term current use of insulin    3. Essential hypertension    4. Morbid obesity with BMI of 45.0-49.9, adult    5. Acute left ankle pain    6. Gouty arthritis of both ankles    7. Encounter for diabetic foot exam        Plan:       Impetigo  -     chlorhexidine (HIBICLENS) 4 % external liquid; Apply topically daily as needed (ski infection).  Dispense: 236 mL; Refill: 1  -     mupirocin (BACTROBAN) 2 % ointment; Apply topically 2 (two) times daily.  Dispense: 22 g; Refill: 0  -     Discontinue: ciprofloxacin-dexamethasone 0.3-0.1% (CIPRODEX) 0.3-0.1 % DrpS; Place 4 drops into both ears 2 (two) times daily.  Dispense: 7.5 mL; Refill: 0    Type 2 diabetes mellitus without complication, without long-term current use of insulin  Comments:  fairly controlled Hgba1c 6.7 need new labs soon  Orders:  -     Hemoglobin A1C; Future; Expected date: 05/14/2022  -     Microalbumin/Creatinine Ratio, Urine; Future; Expected date: 05/14/2022  -     Diabetic Eye Screening Photo; Future    Essential hypertension  Comments:  BP well controlled continue with tx    Morbid obesity with BMI of 45.0-49.9, adult  Comments:  contiue try loosew t with diet exercise and bariatric surgery when pt can afford it    Acute left ankle pain  Comments:  prob  gout    Gouty arthritis of both ankles  -     indomethacin (INDOCIN SR) 75 mg CpSR CR capsule; Take 1 capsule (75 mg total) by mouth 2 (two) times daily. 1 po BID for gout  Dispense: 40 capsule; Refill: 0    Encounter for diabetic foot exam  Comments:  normal foot exam bilaterally        Medication List with Changes/Refills   New Medications    CHLORHEXIDINE (HIBICLENS) 4 % EXTERNAL LIQUID    Apply topically daily as needed (ski infection).    MUPIROCIN (BACTROBAN) 2 % OINTMENT    Apply topically 2 (two) times daily.    NEOMYCIN-POLYMYXIN-HYDROCORTISONE (CORTISPORIN) 3.5-10,000-1 MG/ML-UNIT/ML-% OTIC SUSPENSION    Place 3 drops into both ears 3 (three) times daily.   Current Medications    ALLOPURINOL (ZYLOPRIM) 300 MG TABLET    Take 1 tablet (300 mg total) by mouth once daily.    AMLODIPINE-BENAZEPRIL 5-20 MG (LOTREL) 5-20 MG PER CAPSULE    Take 1 capsule by mouth 2 (two) times a day.    ATORVASTATIN (LIPITOR) 40 MG TABLET    Take 1 tablet (40 mg total) by mouth once daily.    BIKTARVY -25 MG PER TABLET    once daily.    BLOOD PRESSURE MONITOR ([facility] EASE) XL ARM SIZE (OP)    by Other route as needed for High Blood Pressure.    CLOBETASOL (TEMOVATE) 0.05 % EXTERNAL SOLUTION    SMARTSIG:Topical Morning-Evening    DEXAMETHASONE (DECADRON) 4 MG TAB    Take 1 tablet (4 mg total) by mouth every 12 (twelve) hours.    ERGOCALCIFEROL (ERGOCALCIFEROL) 50,000 UNIT CAP    Take 50,000 Units by mouth every 7 days.    ESCITALOPRAM OXALATE (LEXAPRO) 20 MG TABLET    Take 20 mg by mouth once daily.    FUROSEMIDE (LASIX) 40 MG TABLET    Take 1 tablet (40 mg total) by mouth 2 (two) times daily.    GABAPENTIN (NEURONTIN) 300 MG CAPSULE    Take 300 mg by mouth 3 (three) times daily.    HYDROCODONE-ACETAMINOPHEN (NORCO) 5-325 MG PER TABLET    One p.o. q.6 hours p.r.n. pain    IMIQUIMOD (ALDARA) 5 % CREAM    Apply topically.    METFORMIN (GLUCOPHAGE) 500 MG TABLET    Take 500 mg by mouth.    TRAMADOL (ULTRAM) 50 MG TABLET    Take  1 tablet (50 mg total) by mouth every 4 (four) hours as needed for Pain.    TRAZODONE (DESYREL) 100 MG TABLET    Take 100-200 mg by mouth nightly as needed.   Changed and/or Refilled Medications    Modified Medication Previous Medication    INDOMETHACIN (INDOCIN SR) 75 MG CPSR CR CAPSULE indomethacin (INDOCIN SR) 75 mg CpSR CR capsule       Take 1 capsule (75 mg total) by mouth 2 (two) times daily. 1 po BID for gout    Take 1 capsule (75 mg total) by mouth 2 (two) times daily. 1 po BID for gout   Discontinued Medications    ACETIC ACID-HYDROCORTISONE (VOSOL-HC) OTIC SOLUTION        CHLORHEXIDINE (HIBICLENS) 4 % EXTERNAL LIQUID    Apply topically daily as needed (skin infection).    MUPIROCIN (BACTROBAN) 2 % OINTMENT    Apply topically 2 (two) times daily.

## 2022-05-13 RX ORDER — NEOMYCIN SULFATE, POLYMYXIN B SULFATE AND HYDROCORTISONE 10; 3.5; 1 MG/ML; MG/ML; [USP'U]/ML
3 SUSPENSION/ DROPS AURICULAR (OTIC) 3 TIMES DAILY
Qty: 10 ML | Refills: 0 | Status: SHIPPED | OUTPATIENT
Start: 2022-05-13 | End: 2023-03-07

## 2022-05-16 ENCOUNTER — PATIENT MESSAGE (OUTPATIENT)
Dept: PRIMARY CARE CLINIC | Facility: CLINIC | Age: 32
End: 2022-05-16
Payer: MEDICARE

## 2022-05-17 ENCOUNTER — TELEPHONE (OUTPATIENT)
Dept: PRIMARY CARE CLINIC | Facility: CLINIC | Age: 32
End: 2022-05-17
Payer: MEDICARE

## 2022-05-17 NOTE — TELEPHONE ENCOUNTER
----- Message from Selma Aguila sent at 5/17/2022 10:24 AM CDT -----  Contact: Patient, [phone]  Patient is returning a phone call.  Who left a message for the patient: Chiqui  Does patient know what this is regarding:  Schedule nurse visit  Would you like a call back, or a response through your [facility] portal?:   Call back  Comments:  Please call him. Thanks.

## 2022-05-18 ENCOUNTER — OFFICE VISIT (OUTPATIENT)
Dept: CARDIOLOGY | Facility: CLINIC | Age: 32
End: 2022-05-18
Payer: MEDICARE

## 2022-05-18 ENCOUNTER — PATIENT OUTREACH (OUTPATIENT)
Dept: ADMINISTRATIVE | Facility: OTHER | Age: 32
End: 2022-05-18
Payer: MEDICARE

## 2022-05-18 VITALS
SYSTOLIC BLOOD PRESSURE: 128 MMHG | WEIGHT: 315 LBS | DIASTOLIC BLOOD PRESSURE: 96 MMHG | BODY MASS INDEX: 47.74 KG/M2 | HEIGHT: 68 IN | HEART RATE: 120 BPM | OXYGEN SATURATION: 96 %

## 2022-05-18 DIAGNOSIS — G47.33 OSA (OBSTRUCTIVE SLEEP APNEA): ICD-10-CM

## 2022-05-18 DIAGNOSIS — R06.09 DYSPNEA ON EXERTION: Primary | ICD-10-CM

## 2022-05-18 DIAGNOSIS — E66.01 MORBID OBESITY: ICD-10-CM

## 2022-05-18 PROCEDURE — 93010 EKG 12-LEAD: ICD-10-PCS | Mod: S$PBB,,, | Performed by: INTERNAL MEDICINE

## 2022-05-18 PROCEDURE — 93010 ELECTROCARDIOGRAM REPORT: CPT | Mod: S$PBB,,, | Performed by: INTERNAL MEDICINE

## 2022-05-18 PROCEDURE — 93005 ELECTROCARDIOGRAM TRACING: CPT | Mod: PBBFAC,PN | Performed by: INTERNAL MEDICINE

## 2022-05-18 PROCEDURE — 99203 OFFICE O/P NEW LOW 30 MIN: CPT | Mod: S$PBB,,, | Performed by: NURSE PRACTITIONER

## 2022-05-18 PROCEDURE — 99215 OFFICE O/P EST HI 40 MIN: CPT | Mod: PBBFAC,PN | Performed by: NURSE PRACTITIONER

## 2022-05-18 PROCEDURE — 99999 PR PBB SHADOW E&M-EST. PATIENT-LVL V: CPT | Mod: PBBFAC,,, | Performed by: NURSE PRACTITIONER

## 2022-05-18 PROCEDURE — 99999 PR PBB SHADOW E&M-EST. PATIENT-LVL V: ICD-10-PCS | Mod: PBBFAC,,, | Performed by: NURSE PRACTITIONER

## 2022-05-18 PROCEDURE — 99203 PR OFFICE/OUTPT VISIT, NEW, LEVL III, 30-44 MIN: ICD-10-PCS | Mod: S$PBB,,, | Performed by: NURSE PRACTITIONER

## 2022-05-18 NOTE — PROGRESS NOTES
Cardiology    5/18/2022  11:32 AM    Problem list  Patient Active Problem List   Diagnosis    Left foot pain    Gout    Hypertension    Ankle edema    Morbid obesity with BMI of 45.0-49.9, adult    GASTON (obstructive sleep apnea)    Nocturnal hypoxemia       CC:  Pre-procedural cardiac evaluation    HPI:  Patient is unsure why he is here today.  Chart review does show that he had been seen by Dr. Pope in preparation for bariatric surgery/  He reports shortness of breath for a couple of months if he walks too much due to gout and arthritis.  He gasps for air  No chest pain  Does not occur at rest  No tobacco  No etoh  Has palps and skips in heartbeat  Working on getting CPAP- insurance took it away.  This was before he got approved for disability  Brother had CHF, passed away at 30  Father had massive MI at 62  Some near syncope  Had been seen by Dr. Pope  And this plan is on hold due to finances.  He thought he was coming in for a diabetic eye screening today.   Followed at [facility] by ID for HIV infection.  On Biktarvy and tolerating well.      Medications  Current Outpatient Medications   Medication Sig Dispense Refill    allopurinoL (ZYLOPRIM) 300 MG tablet Take 1 tablet (300 mg total) by mouth once daily. 90 tablet 3    amlodipine-benazepril 5-20 mg (LOTREL) 5-20 mg per capsule Take 1 capsule by mouth 2 (two) times a day. 180 capsule 1    atorvastatin (LIPITOR) 40 MG tablet Take 1 tablet (40 mg total) by mouth once daily. 90 tablet 3    BIKTARVY -25 mg per tablet once daily.      blood pressure monitor ([employer] EASE) XL arm size (OP) by Other route as needed for High Blood Pressure. 1 each 0    chlorhexidine (HIBICLENS) 4 % external liquid Apply topically daily as needed (ski infection). 236 mL 1    clobetasoL (TEMOVATE) 0.05 % external solution SMARTSIG:Topical Morning-Evening      dexAMETHasone (DECADRON) 4 MG Tab Take 1 tablet (4 mg total) by mouth every 12 (twelve)  hours. 10 tablet 0    ergocalciferol (ERGOCALCIFEROL) 50,000 unit Cap Take 50,000 Units by mouth every 7 days.      EScitalopram oxalate (LEXAPRO) 20 MG tablet Take 20 mg by mouth once daily.      furosemide (LASIX) 40 MG tablet Take 1 tablet (40 mg total) by mouth 2 (two) times daily. 60 tablet 11    gabapentin (NEURONTIN) 300 MG capsule Take 300 mg by mouth 3 (three) times daily.      imiquimod (ALDARA) 5 % cream Apply topically.      indomethacin (INDOCIN SR) 75 mg CpSR CR capsule Take 1 capsule (75 mg total) by mouth 2 (two) times daily. 1 po BID for gout 40 capsule 0    metFORMIN (GLUCOPHAGE) 500 MG tablet Take 500 mg by mouth.      mupirocin (BACTROBAN) 2 % ointment Apply topically 2 (two) times daily. 22 g 0    neomycin-polymyxin-hydrocortisone (CORTISPORIN) 3.5-10,000-1 mg/mL-unit/mL-% otic suspension Place 3 drops into both ears 3 (three) times daily. 10 mL 0    traMADoL (ULTRAM) 50 mg tablet Take 1 tablet (50 mg total) by mouth every 4 (four) hours as needed for Pain. 12 tablet 0    traZODone (DESYREL) 100 MG tablet Take 100-200 mg by mouth nightly as needed.      HYDROcodone-acetaminophen (NORCO) 5-325 mg per tablet One p.o. q.6 hours p.r.n. pain 30 tablet 0     No current facility-administered medications for this visit.      Prior to Admission medications    Medication Sig Start Date End Date Taking? Authorizing Provider   allopurinoL (ZYLOPRIM) 300 MG tablet Take 1 tablet (300 mg total) by mouth once daily. 1/17/22  Yes Santiago Chaparro MD   amlodipine-benazepril 5-20 mg (LOTREL) 5-20 mg per capsule Take 1 capsule by mouth 2 (two) times a day. 1/12/22  Yes Santiago Chaparro MD   atorvastatin (LIPITOR) 40 MG tablet Take 1 tablet (40 mg total) by mouth once daily. 1/17/22 1/17/23 Yes Santiago Chaparro MD   BIKTARVY -25 mg per tablet once daily. 1/5/22  Yes Historical Provider   blood pressure monitor ([employer] EASE) XL arm size (OP) by Other route as needed for High Blood Pressure. 12/22/20  Yes  Santiago Chaparro MD   chlorhexidine (HIBICLENS) 4 % external liquid Apply topically daily as needed (ski infection). 5/12/22  Yes Santiago Chaparro MD   clobetasoL (TEMOVATE) 0.05 % external solution SMARTSIG:Topical Morning-Evening 9/10/21  Yes Historical Provider   dexAMETHasone (DECADRON) 4 MG Tab Take 1 tablet (4 mg total) by mouth every 12 (twelve) hours. 4/12/22  Yes Santiago Chaparro MD   ergocalciferol (ERGOCALCIFEROL) 50,000 unit Cap Take 50,000 Units by mouth every 7 days. 11/23/21  Yes Historical Provider   EScitalopram oxalate (LEXAPRO) 20 MG tablet Take 20 mg by mouth once daily. 9/23/21  Yes Historical Provider   furosemide (LASIX) 40 MG tablet Take 1 tablet (40 mg total) by mouth 2 (two) times daily. 11/17/17 5/18/22 Yes Santiago Chaparro MD   gabapentin (NEURONTIN) 300 MG capsule Take 300 mg by mouth 3 (three) times daily. 9/23/21  Yes Historical Provider   imiquimod (ALDARA) 5 % cream Apply topically. 11/23/21  Yes Historical Provider   indomethacin (INDOCIN SR) 75 mg CpSR CR capsule Take 1 capsule (75 mg total) by mouth 2 (two) times daily. 1 po BID for gout 5/12/22  Yes Santiago Chaparro MD   metFORMIN (GLUCOPHAGE) 500 MG tablet Take 500 mg by mouth.   Yes Historical Provider   mupirocin (BACTROBAN) 2 % ointment Apply topically 2 (two) times daily. 5/12/22  Yes Santiago Chaparro MD   neomycin-polymyxin-hydrocortisone (CORTISPORIN) 3.5-10,000-1 mg/mL-unit/mL-% otic suspension Place 3 drops into both ears 3 (three) times daily. 5/13/22  Yes Santiago Chaparro MD   traMADoL (ULTRAM) 50 mg tablet Take 1 tablet (50 mg total) by mouth every 4 (four) hours as needed for Pain. 7/1/21  Yes Sherrie Jernigan PA-C   traZODone (DESYREL) 100 MG tablet Take 100-200 mg by mouth nightly as needed. 10/27/21  Yes Historical Provider   HYDROcodone-acetaminophen (NORCO) 5-325 mg per tablet One p.o. q.6 hours p.r.n. pain 8/9/18   Zeke Oliveira MD         History  Past Medical History:   Diagnosis Date    Edema     Gout      Hypertension      No past surgical history on file.  Social History     Socioeconomic History    Marital status: Single   Tobacco Use    Smoking status: Never Smoker    Smokeless tobacco: Never Used   Substance and Sexual Activity    Alcohol use: No    Drug use: No    Sexual activity: Not Currently         Allergies  Review of patient's allergies indicates:  No Known Allergies      Review of Systems   Review of Systems   Constitutional: Negative for diaphoresis and malaise/fatigue.   HENT: Negative.    Cardiovascular: Positive for dyspnea on exertion, irregular heartbeat and palpitations. Negative for chest pain, claudication, leg swelling, near-syncope, orthopnea, paroxysmal nocturnal dyspnea and syncope.   Respiratory: Negative for shortness of breath.    Endocrine: Negative for polydipsia, polyphagia and polyuria.   Hematologic/Lymphatic: Does not bruise/bleed easily.   Gastrointestinal: Negative for bloating, nausea and vomiting.   Genitourinary: Negative.    Neurological: Negative for excessive daytime sleepiness, dizziness, light-headedness, loss of balance and weakness.   Psychiatric/Behavioral: The patient is not nervous/anxious.    Allergic/Immunologic: Negative.          Physical Exam  Wt Readings from Last 1 Encounters:   05/18/22 (!) 193.9 kg (427 lb 7.6 oz)     BP Readings from Last 3 Encounters:   05/18/22 (!) 128/96   05/12/22 124/82   04/12/22 136/84     Pulse Readings from Last 1 Encounters:   05/18/22 (!) 120     Body mass index is 65 kg/m².    Physical Exam  Vitals and nursing note reviewed.   Constitutional:       Appearance: Normal appearance. He is obese.   HENT:      Head: Normocephalic and atraumatic.      Mouth/Throat:      Mouth: Mucous membranes are moist.   Eyes:      Pupils: Pupils are equal, round, and reactive to light.   Cardiovascular:      Rate and Rhythm: Normal rate and regular rhythm.      Pulses:           Radial pulses are 2+ on the right side and 2+ on the left side.         Dorsalis pedis pulses are 2+ on the right side and 2+ on the left side.        Posterior tibial pulses are 2+ on the right side and 2+ on the left side.      Heart sounds: No murmur heard.  Pulmonary:      Effort: Pulmonary effort is normal. No respiratory distress.      Comments: Diminished posteriorly  Abdominal:      General: There is no distension.      Tenderness: There is no abdominal tenderness.   Musculoskeletal:      Cervical back: Normal range of motion.      Right lower leg: No edema.      Left lower leg: No edema.   Skin:     General: Skin is warm and dry.      Findings: No erythema.   Neurological:      General: No focal deficit present.      Mental Status: He is alert.   Psychiatric:         Mood and Affect: Mood normal.         Behavior: Behavior normal.           Problem List Items Addressed This Visit        Cardiac/Vascular    Dyspnea on exertion - Primary    Overview     Unclear if anginal equivalent- his risk factors are of concern (DM, HTN, obesity)  Patient requests to hold off on stress testing- will complete Holter and Echo to rule out arrhythmia and/or HF as contributing factors to dyspnea.  Do highly suspect a constellation of events including obesity and deconditioning           Current Assessment & Plan     As above           Relevant Orders    IN OFFICE EKG 12-LEAD (to [place]) (Completed)    Echo    Holter monitor - 48 hour       Other    GASTON (obstructive sleep apnea)    Overview     Highly recommend that he resume CPAP therapy.  Bariatric surgery will help with correcting underlying problem is obesity is causative, but as surgery is pending indefinitely I do not recommend that he wait until after surgery to be reevaluated,            Current Assessment & Plan     As above             Other Visit Diagnoses     Morbid obesity                        Follow Up  4 weeks      @Pili Alicea DNP

## 2022-05-18 NOTE — PROGRESS NOTES
Health Maintenance Due   Topic Date Due    Eye Exam  Never done    COVID-19 Vaccine (3 - Booster) 03/06/2022     Updates were requested from care everywhere.  Chart was reviewed for overdue Proactive [facility] Encounters (EDSON) topics (CRS, Breast Cancer Screening, Eye exam)  Health Maintenance has been updated.  LINKS immunization registry triggered.  Immunizations were reconciled.

## 2022-05-18 NOTE — PATIENT INSTRUCTIONS
We will get an echocardiogram and a Holter monitor to look into your shortness of breath    We will follow up with you in about 4 weeks, a virtual visit is fine

## 2022-05-20 ENCOUNTER — TELEPHONE (OUTPATIENT)
Dept: PRIMARY CARE CLINIC | Facility: CLINIC | Age: 32
End: 2022-05-20
Payer: MEDICARE

## 2022-05-20 NOTE — TELEPHONE ENCOUNTER
----- Message from Kenzie S Jimbo sent at 5/20/2022  1:32 PM CDT -----  Contact: Pt Mobile [phone]  Patient is returning a phone call.  Who left a message for the patient:   Does patient know what this is regarding:    Would you like a call back, or a response through your [facility] portal?:  Call  Comments:  Patient said he received a call on today and he would like a call back please.

## 2022-05-20 NOTE — TELEPHONE ENCOUNTER
----- Message from Geonveva Duarte sent at 5/20/2022  4:06 PM CDT -----  Contact: Self/[phone]  Patient is returning a phone call.  Who left a message for the patient: Chiqui   Does patient know what this is regarding:    Would you like a call back, or a response through your [facility] portal?:   call back   Comments:

## 2022-05-23 PROBLEM — R06.09 DYSPNEA ON EXERTION: Status: ACTIVE | Noted: 2022-05-23

## 2022-06-01 ENCOUNTER — PATIENT MESSAGE (OUTPATIENT)
Dept: CARDIOLOGY | Facility: CLINIC | Age: 32
End: 2022-06-01
Payer: MEDICARE

## 2022-06-01 ENCOUNTER — OFFICE VISIT (OUTPATIENT)
Dept: PRIMARY CARE CLINIC | Facility: CLINIC | Age: 32
End: 2022-06-01
Payer: MEDICARE

## 2022-06-01 ENCOUNTER — CLINICAL SUPPORT (OUTPATIENT)
Dept: PRIMARY CARE CLINIC | Facility: CLINIC | Age: 32
End: 2022-06-01
Attending: INTERNAL MEDICINE
Payer: MEDICARE

## 2022-06-01 VITALS
RESPIRATION RATE: 19 BRPM | HEIGHT: 68 IN | OXYGEN SATURATION: 97 % | DIASTOLIC BLOOD PRESSURE: 82 MMHG | BODY MASS INDEX: 47.74 KG/M2 | HEART RATE: 100 BPM | WEIGHT: 315 LBS | SYSTOLIC BLOOD PRESSURE: 125 MMHG

## 2022-06-01 DIAGNOSIS — E11.9 TYPE 2 DIABETES MELLITUS WITHOUT COMPLICATION, WITHOUT LONG-TERM CURRENT USE OF INSULIN: Primary | ICD-10-CM

## 2022-06-01 DIAGNOSIS — I10 ESSENTIAL HYPERTENSION: ICD-10-CM

## 2022-06-01 DIAGNOSIS — R53.82 CHRONIC FATIGUE: ICD-10-CM

## 2022-06-01 DIAGNOSIS — G47.33 OSA (OBSTRUCTIVE SLEEP APNEA): ICD-10-CM

## 2022-06-01 DIAGNOSIS — E11.9 TYPE 2 DIABETES MELLITUS WITHOUT COMPLICATION, WITHOUT LONG-TERM CURRENT USE OF INSULIN: ICD-10-CM

## 2022-06-01 DIAGNOSIS — E66.01 MORBID OBESITY WITH BMI OF 60.0-69.9, ADULT: ICD-10-CM

## 2022-06-01 PROCEDURE — 99999 PR PBB SHADOW E&M-EST. PATIENT-LVL V: ICD-10-PCS | Mod: PBBFAC,,, | Performed by: INTERNAL MEDICINE

## 2022-06-01 PROCEDURE — 99214 OFFICE O/P EST MOD 30 MIN: CPT | Mod: S$PBB,,, | Performed by: INTERNAL MEDICINE

## 2022-06-01 PROCEDURE — 99999 PR PBB SHADOW E&M-EST. PATIENT-LVL V: CPT | Mod: PBBFAC,,, | Performed by: INTERNAL MEDICINE

## 2022-06-01 PROCEDURE — 99214 PR OFFICE/OUTPT VISIT, EST, LEVL IV, 30-39 MIN: ICD-10-PCS | Mod: S$PBB,,, | Performed by: INTERNAL MEDICINE

## 2022-06-01 PROCEDURE — 99215 OFFICE O/P EST HI 40 MIN: CPT | Mod: PBBFAC,PN | Performed by: INTERNAL MEDICINE

## 2022-06-01 NOTE — PROGRESS NOTES
Subjective:       Patient ID: Mickey D Jeansonne is a 31 y.o. male.    Chief Complaint: Follow-up    HPI  Pt visit today for f/u still having sob with exertion and GASTON need new CPAP machine insurance took it away no fever chill try to loose wt c/o chronic fatigue no n/v/d   Review of Systems    Objective:      Physical Exam  Vitals and nursing note reviewed.   Constitutional:       General: He is not in acute distress.     Appearance: He is well-developed. He is obese.   HENT:      Head: Normocephalic and atraumatic.      Right Ear: External ear normal.      Left Ear: External ear normal.      Nose: Nose normal.      Mouth/Throat:      Pharynx: No oropharyngeal exudate.   Eyes:      Extraocular Movements: Extraocular movements intact.      Conjunctiva/sclera: Conjunctivae normal.      Pupils: Pupils are equal, round, and reactive to light.   Neck:      Thyroid: No thyromegaly.   Cardiovascular:      Rate and Rhythm: Normal rate and regular rhythm.      Heart sounds: Normal heart sounds. No murmur heard.    No friction rub. No gallop.   Pulmonary:      Effort: Pulmonary effort is normal. No respiratory distress.      Breath sounds: Normal breath sounds. No wheezing or rales.   Abdominal:      General: Bowel sounds are normal. There is no distension.      Palpations: Abdomen is soft.      Tenderness: There is no abdominal tenderness. There is no guarding.   Musculoskeletal:         General: No tenderness or deformity. Normal range of motion.      Cervical back: Normal range of motion and neck supple.   Lymphadenopathy:      Cervical: No cervical adenopathy.   Skin:     General: Skin is warm and dry.      Findings: No erythema or rash.   Neurological:      General: No focal deficit present.      Mental Status: He is alert and oriented to person, place, and time.   Psychiatric:         Thought Content: Thought content normal.         Judgment: Judgment normal.         Assessment:       1. Type 2 diabetes mellitus  without complication, without long-term current use of insulin    2. Essential hypertension    3. GASTON (obstructive sleep apnea)    4. Chronic fatigue    5. Morbid obesity with BMI of 60.0-69.9, adult        Plan:       Type 2 diabetes mellitus without complication, without long-term current use of insulin    Essential hypertension  Comments:  stable continue with tx    GASTON (obstructive sleep apnea)  -     Ambulatory referral/consult to Sleep Disorders; Future; Expected date: 06/02/2022    Chronic fatigue  Comments:  due to mobid obesity and GASTON    Morbid obesity with BMI of 60.0-69.9, adult  Comments:  referal to bariatric surgery  Orders:  -     Ambulatory referral/consult to Weight Management Program; Future; Expected date: 06/08/2022        Medication List with Changes/Refills   Current Medications    ALLOPURINOL (ZYLOPRIM) 300 MG TABLET    Take 1 tablet (300 mg total) by mouth once daily.    AMLODIPINE-BENAZEPRIL 5-20 MG (LOTREL) 5-20 MG PER CAPSULE    Take 1 capsule by mouth 2 (two) times a day.    ATORVASTATIN (LIPITOR) 40 MG TABLET    Take 1 tablet (40 mg total) by mouth once daily.    BIKTARVY -25 MG PER TABLET    once daily.    BLOOD PRESSURE MONITOR ([employer] EASE) XL ARM SIZE (OP)    by Other route as needed for High Blood Pressure.    CHLORHEXIDINE (HIBICLENS) 4 % EXTERNAL LIQUID    Apply topically daily as needed (ski infection).    CLOBETASOL (TEMOVATE) 0.05 % EXTERNAL SOLUTION    SMARTSIG:Topical Morning-Evening    ERGOCALCIFEROL (ERGOCALCIFEROL) 50,000 UNIT CAP    Take 50,000 Units by mouth every 7 days.    ESCITALOPRAM OXALATE (LEXAPRO) 20 MG TABLET    Take 20 mg by mouth once daily.    FUROSEMIDE (LASIX) 40 MG TABLET    Take 1 tablet (40 mg total) by mouth 2 (two) times daily.    GABAPENTIN (NEURONTIN) 300 MG CAPSULE    Take 300 mg by mouth 3 (three) times daily.    IMIQUIMOD (ALDARA) 5 % CREAM    Apply topically.    INDOMETHACIN (INDOCIN SR) 75 MG CPSR CR CAPSULE    Take 1 capsule (75 mg  total) by mouth 2 (two) times daily. 1 po BID for gout    METFORMIN (GLUCOPHAGE) 500 MG TABLET    Take 500 mg by mouth.    MUPIROCIN (BACTROBAN) 2 % OINTMENT    Apply topically 2 (two) times daily.    NEOMYCIN-POLYMYXIN-HYDROCORTISONE (CORTISPORIN) 3.5-10,000-1 MG/ML-UNIT/ML-% OTIC SUSPENSION    Place 3 drops into both ears 3 (three) times daily.    TRAZODONE (DESYREL) 100 MG TABLET    Take 100-200 mg by mouth nightly as needed.   Discontinued Medications    DEXAMETHASONE (DECADRON) 4 MG TAB    Take 1 tablet (4 mg total) by mouth every 12 (twelve) hours.    HYDROCODONE-ACETAMINOPHEN (NORCO) 5-325 MG PER TABLET    One p.o. q.6 hours p.r.n. pain    TRAMADOL (ULTRAM) 50 MG TABLET    Take 1 tablet (50 mg total) by mouth every 4 (four) hours as needed for Pain.

## 2022-06-03 ENCOUNTER — PATIENT OUTREACH (OUTPATIENT)
Dept: ADMINISTRATIVE | Facility: HOSPITAL | Age: 32
End: 2022-06-03
Payer: MEDICARE

## 2022-06-06 ENCOUNTER — PATIENT MESSAGE (OUTPATIENT)
Dept: PRIMARY CARE CLINIC | Facility: CLINIC | Age: 32
End: 2022-06-06
Payer: MEDICARE

## 2022-06-06 DIAGNOSIS — R21 FACIAL RASH: Primary | ICD-10-CM

## 2022-06-07 RX ORDER — CLOTRIMAZOLE AND BETAMETHASONE DIPROPIONATE 10; .64 MG/G; MG/G
CREAM TOPICAL 2 TIMES DAILY
Qty: 30 G | Refills: 1 | Status: SHIPPED | OUTPATIENT
Start: 2022-06-07

## 2022-06-14 ENCOUNTER — OFFICE VISIT (OUTPATIENT)
Dept: CARDIOLOGY | Facility: CLINIC | Age: 32
End: 2022-06-14
Payer: MEDICARE

## 2022-06-14 DIAGNOSIS — R06.09 OTHER FORMS OF DYSPNEA: ICD-10-CM

## 2022-06-14 DIAGNOSIS — R06.09 DYSPNEA ON EXERTION: Primary | ICD-10-CM

## 2022-06-14 PROCEDURE — 99213 OFFICE O/P EST LOW 20 MIN: CPT | Mod: 95,,, | Performed by: NURSE PRACTITIONER

## 2022-06-14 PROCEDURE — 99213 PR OFFICE/OUTPT VISIT, EST, LEVL III, 20-29 MIN: ICD-10-PCS | Mod: 95,,, | Performed by: NURSE PRACTITIONER

## 2022-06-14 NOTE — PROGRESS NOTES
The patient location is: home  The chief complaint leading to consultation is:results reveiw    Visit type: continuous audiovisual    Face to Face time with patient: 12  15 minutes of total time spent on the encounter, which includes face to face time and non-face to face time preparing to see the patient (eg, review of tests), Obtaining and/or reviewing separately obtained history, Documenting clinical information in the electronic or other health record, Independently interpreting results (not separately reported) and communicating results to the patient/family/caregiver, or Care coordination (not separately reported).         Each patient to whom he or she provides medical services by telemedicine is:  (1) informed of the relationship between the physician and patient and the respective role of any other health care provider with respect to management of the patient; and (2) notified that he or she may decline to receive medical services by telemedicine and may withdraw from such care at any time.    Notes:   Review of Systems   Constitutional: Positive for malaise/fatigue.   HENT: Negative.    Eyes: Negative.    Respiratory: Positive for shortness of breath.    Cardiovascular: Negative.    Gastrointestinal: Negative.    Genitourinary: Negative.    Musculoskeletal: Negative.    Skin: Negative.    Neurological: Negative.    Endo/Heme/Allergies: Negative.    Psychiatric/Behavioral: Negative.      Physical exam limited due to virtual visit.  Patient AAOx4, speaking in full sentences, no distress noted.    Problem List Items Addressed This Visit        Cardiac/Vascular    Dyspnea on exertion - Primary    Overview     Unclear if anginal equivalent- his risk factors are of concern (DM, HTN, obesity)  Echo and Holter unremarkable.  Would recommend stress test as patient is still having symptoms.  Order Cardiac PET as unable to pursue traditional stress EKG due to treadmill capacity. Already had echo           Current  Assessment & Plan     As above           Relevant Orders    Cardiac PET Scan Stress      Other Visit Diagnoses     Other forms of dyspnea         Relevant Orders    Cardiac PET Scan Stress

## 2022-06-15 ENCOUNTER — TELEPHONE (OUTPATIENT)
Dept: CARDIOLOGY | Facility: CLINIC | Age: 32
End: 2022-06-15
Payer: MEDICARE

## 2022-06-15 ENCOUNTER — CLINICAL SUPPORT (OUTPATIENT)
Dept: PRIMARY CARE CLINIC | Facility: CLINIC | Age: 32
End: 2022-06-15
Payer: MEDICARE

## 2022-06-15 VITALS — HEART RATE: 107 BPM | DIASTOLIC BLOOD PRESSURE: 70 MMHG | SYSTOLIC BLOOD PRESSURE: 114 MMHG

## 2022-06-15 PROCEDURE — 99999 PR PBB SHADOW E&M-EST. PATIENT-LVL II: CPT | Mod: PBBFAC,,,

## 2022-06-15 PROCEDURE — 99999 PR PBB SHADOW E&M-EST. PATIENT-LVL II: ICD-10-PCS | Mod: PBBFAC,,,

## 2022-06-15 PROCEDURE — 99212 OFFICE O/P EST SF 10 MIN: CPT | Mod: PBBFAC,PN

## 2022-06-15 NOTE — TELEPHONE ENCOUNTER
----- Message from Pili Alicea, JOANNE sent at 6/14/2022  3:39 PM CDT -----  Patient needs a cardiac PET- which I [place] can [place] happen [place] [facility] [place]    Thanks,  Pili

## 2022-06-15 NOTE — TELEPHONE ENCOUNTER
I will schedule Cardiac Pet @ [facility] and call patient with appointment date.

## 2022-06-15 NOTE — TELEPHONE ENCOUNTER
----- Message from Manju Ojeda sent at 6/15/2022  3:39 PM CDT -----  Contact: pt  Pt returning call to staff.       Confirmed contact below:  Contact Name:Mickey Jeansonne  Phone Number: [phone]

## 2022-06-15 NOTE — TELEPHONE ENCOUNTER
Patient requested that cardiac Stress be scheduled after 7/4/22.  Informed patient cardiac PET scheduled @ [facility] on 7/8/22 @ 12:30 PM.  Pre op instructions reviewed with patient.

## 2022-07-06 ENCOUNTER — TELEPHONE (OUTPATIENT)
Dept: CARDIOLOGY | Facility: HOSPITAL | Age: 32
End: 2022-07-06
Payer: MEDICARE

## 2022-07-08 ENCOUNTER — PATIENT MESSAGE (OUTPATIENT)
Dept: CARDIOLOGY | Facility: CLINIC | Age: 32
End: 2022-07-08
Payer: MEDICARE

## 2022-07-08 ENCOUNTER — HOSPITAL ENCOUNTER (OUTPATIENT)
Dept: CARDIOLOGY | Facility: HOSPITAL | Age: 32
Discharge: HOME | End: 2022-07-08
Attending: NURSE PRACTITIONER
Payer: MEDICARE

## 2022-07-08 VITALS
DIASTOLIC BLOOD PRESSURE: 100 MMHG | HEART RATE: 100 BPM | WEIGHT: 315 LBS | SYSTOLIC BLOOD PRESSURE: 166 MMHG | HEIGHT: 68 IN | BODY MASS INDEX: 47.74 KG/M2

## 2022-07-08 DIAGNOSIS — R06.09 OTHER FORMS OF DYSPNEA: ICD-10-CM

## 2022-07-08 DIAGNOSIS — R06.09 DYSPNEA ON EXERTION: ICD-10-CM

## 2022-07-08 LAB
CFR FLOW - ANTERIOR: 1.69
CFR FLOW - INFERIOR: 1.71
CFR FLOW - LATERAL: 1.73
CFR FLOW - MAX: 2.27
CFR FLOW - MIN: 1.34
CFR FLOW - SEPTAL: 1.94
CFR FLOW - WHOLE HEART: 1.77
CFR FLOW- DEFECT 1: 2.06
CV PHARM DOSE: 0.4 MG
CV STRESS BASE HR: 100 BPM
DIASTOLIC BLOOD PRESSURE: 100 MMHG
EJECTION FRACTION- HIGH: 65 %
END DIASTOLIC INDEX-HIGH: 153 ML/M2
END DIASTOLIC INDEX-LOW: 93 ML/M2
END SYSTOLIC INDEX-HIGH: 71 ML/M2
END SYSTOLIC INDEX-LOW: 31 ML/M2
NUC REST DIASTOLIC VOLUME INDEX: 132
NUC REST EJECTION FRACTION: 63
NUC REST SYSTOLIC VOLUME INDEX: 49
NUC STRESS DIASTOLIC VOLUME INDEX: 133
NUC STRESS EJECTION FRACTION: 65 %
NUC STRESS SYSTOLIC VOLUME INDEX: 49
OHS CV CPX 85 PERCENT MAX PREDICTED HEART RATE MALE: 160
OHS CV CPX MAX PREDICTED HEART RATE: 188
OHS CV CPX PATIENT IS FEMALE: 0
OHS CV CPX PATIENT IS MALE: 1
OHS CV CPX PEAK DIASTOLIC BLOOD PRESSURE: 61 MMHG
OHS CV CPX PEAK HEAR RATE: 105 BPM
OHS CV CPX PEAK RATE PRESSURE PRODUCT: NORMAL
OHS CV CPX PEAK SYSTOLIC BLOOD PRESSURE: 133 MMHG
OHS CV CPX PERCENT MAX PREDICTED HEART RATE ACHIEVED: 56
OHS CV CPX RATE PRESSURE PRODUCT PRESENTING: NORMAL
PERFUSION DEFECT 1 SIZE IN %: 9 %
REST FLOW - ANTERIOR: 0.73 CC/MIN/G
REST FLOW - INFERIOR: 0.77 CC/MIN/G
REST FLOW - LATERAL: 0.85 CC/MIN/G
REST FLOW - MAX: 1.15 CC/MIN/G
REST FLOW - MIN: 0.33 CC/MIN/G
REST FLOW - SEPTAL: 0.58 CC/MIN/G
REST FLOW - WHOLE HEART: 0.73 CC/MIN/G
REST FLOW- DEFECT 1: 0.42 CC/MIN/G
RETIRED EF AND QEF - SEE NOTES: 53 %
STRESS FLOW - ANTERIOR: 1.22 CC/MIN/G
STRESS FLOW - INFERIOR: 1.31 CC/MIN/G
STRESS FLOW - LATERAL: 1.46 CC/MIN/G
STRESS FLOW - MAX: 1.69 CC/MIN/G
STRESS FLOW - MIN: 0.52 CC/MIN/G
STRESS FLOW - SEPTAL: 1.11 CC/MIN/G
STRESS FLOW - WHOLE HEART: 1.28 CC/MIN/G
STRESS FLOW- DEFECT 1: 0.87 CC/MIN/G
SYSTOLIC BLOOD PRESSURE: 166 MMHG

## 2022-07-08 PROCEDURE — 78431 MYOCRD IMG PET RST&STRS CT: CPT

## 2022-07-08 PROCEDURE — 63600175 PHARM REV CODE 636 W HCPCS: Performed by: NURSE PRACTITIONER

## 2022-07-08 PROCEDURE — 78434 AQMBF PET REST & RX STRESS: CPT | Mod: 26,,, | Performed by: INTERNAL MEDICINE

## 2022-07-08 PROCEDURE — 78434 AQMBF PET REST & RX STRESS: CPT

## 2022-07-08 PROCEDURE — 78434 CARDIAC PET SCAN STRESS (CUPID ONLY): ICD-10-PCS | Mod: 26,,, | Performed by: INTERNAL MEDICINE

## 2022-07-08 PROCEDURE — 93018 CARDIAC PET SCAN STRESS (CUPID ONLY): ICD-10-PCS | Mod: ,,, | Performed by: INTERNAL MEDICINE

## 2022-07-08 PROCEDURE — 78431 CARDIAC PET SCAN STRESS (CUPID ONLY): ICD-10-PCS | Mod: 26,,, | Performed by: INTERNAL MEDICINE

## 2022-07-08 PROCEDURE — 93018 CV STRESS TEST I&R ONLY: CPT | Mod: ,,, | Performed by: INTERNAL MEDICINE

## 2022-07-08 PROCEDURE — 93016 CV STRESS TEST SUPVJ ONLY: CPT | Mod: ,,, | Performed by: INTERNAL MEDICINE

## 2022-07-08 PROCEDURE — 93016 CARDIAC PET SCAN STRESS (CUPID ONLY): ICD-10-PCS | Mod: ,,, | Performed by: INTERNAL MEDICINE

## 2022-07-08 PROCEDURE — 78431 MYOCRD IMG PET RST&STRS CT: CPT | Mod: 26,,, | Performed by: INTERNAL MEDICINE

## 2022-07-08 RX ORDER — REGADENOSON 0.08 MG/ML
0.4 INJECTION, SOLUTION INTRAVENOUS ONCE
Status: COMPLETED | OUTPATIENT
Start: 2022-07-08 | End: 2022-07-08

## 2022-07-08 RX ADMIN — REGADENOSON 0.4 MG: 0.08 INJECTION, SOLUTION INTRAVENOUS at 12:07

## 2022-07-08 NOTE — PROGRESS NOTES
OK makes sense.  He came for pre-op eval for gastic sleeve but he is not having the surgery anymore. He has dyspnea and palpitations, his BMI is rather high but it could be anginal equivalent. We will start with medical therapy

## 2022-07-09 ENCOUNTER — PATIENT MESSAGE (OUTPATIENT)
Dept: PRIMARY CARE CLINIC | Facility: CLINIC | Age: 32
End: 2022-07-09
Payer: MEDICARE

## 2022-07-13 ENCOUNTER — PATIENT MESSAGE (OUTPATIENT)
Dept: ADMINISTRATIVE | Facility: OTHER | Age: 32
End: 2022-07-13
Payer: MEDICARE

## 2022-07-19 ENCOUNTER — PATIENT MESSAGE (OUTPATIENT)
Dept: CARDIOLOGY | Facility: CLINIC | Age: 32
End: 2022-07-19
Payer: MEDICARE

## 2022-07-19 ENCOUNTER — PATIENT MESSAGE (OUTPATIENT)
Dept: PRIMARY CARE CLINIC | Facility: CLINIC | Age: 32
End: 2022-07-19

## 2022-07-19 ENCOUNTER — CLINICAL SUPPORT (OUTPATIENT)
Dept: PRIMARY CARE CLINIC | Facility: CLINIC | Age: 32
End: 2022-07-19
Payer: MEDICARE

## 2022-07-19 DIAGNOSIS — G47.33 OSA (OBSTRUCTIVE SLEEP APNEA): Primary | ICD-10-CM

## 2022-07-19 LAB
CTP QC/QA: YES
SARS-COV-2 RDRP RESP QL NAA+PROBE: NEGATIVE

## 2022-07-19 PROCEDURE — U0002 COVID-19 LAB TEST NON-CDC: HCPCS | Mod: PBBFAC,PN

## 2022-07-25 ENCOUNTER — PATIENT OUTREACH (OUTPATIENT)
Dept: ADMINISTRATIVE | Facility: HOSPITAL | Age: 32
End: 2022-07-25
Payer: MEDICARE

## 2022-07-26 ENCOUNTER — OFFICE VISIT (OUTPATIENT)
Dept: CARDIOLOGY | Facility: CLINIC | Age: 32
End: 2022-07-26
Payer: MEDICARE

## 2022-07-26 DIAGNOSIS — G47.33 OSA (OBSTRUCTIVE SLEEP APNEA): ICD-10-CM

## 2022-07-26 DIAGNOSIS — I10 PRIMARY HYPERTENSION: ICD-10-CM

## 2022-07-26 DIAGNOSIS — R06.09 DYSPNEA ON EXERTION: ICD-10-CM

## 2022-07-26 PROCEDURE — 99213 OFFICE O/P EST LOW 20 MIN: CPT | Mod: 95,,, | Performed by: NURSE PRACTITIONER

## 2022-07-26 PROCEDURE — 99213 PR OFFICE/OUTPT VISIT, EST, LEVL III, 20-29 MIN: ICD-10-PCS | Mod: 95,,, | Performed by: NURSE PRACTITIONER

## 2022-07-26 NOTE — PROGRESS NOTES
The patient location is: home  The chief complaint leading to consultation is: 15 min    Visit type: Continuous audiovisual    Face to Face time with patient: 12  15 minutes of total time spent on the encounter, which includes face to face time and non-face to face time preparing to see the patient (eg, review of tests), Obtaining and/or reviewing separately obtained history, Documenting clinical information in the electronic or other health record, Independently interpreting results (not separately reported) and communicating results to the patient/family/caregiver, or Care coordination (not separately reported).         Each patient to whom he or she provides medical services by telemedicine is:  (1) informed of the relationship between the physician and patient and the respective role of any other health care provider with respect to management of the patient; and (2) notified that he or she may decline to receive medical services by telemedicine and may withdraw from such care at any time.    Notes:     Had sleep study last week, next part Aug 24th  No dizziness or lightheadedness  Some shortness of breath  No chest pain  Taking medications and feels like he is tolerating well.      Review of Systems   Constitutional: Positive for malaise/fatigue.   HENT: Negative.    Eyes: Negative.    Respiratory: Positive for shortness of breath.    Cardiovascular: Negative.    Gastrointestinal: Negative.    Genitourinary: Negative.    Musculoskeletal: Negative.    Skin: Negative.    Neurological: Negative.    Endo/Heme/Allergies: Negative.    Psychiatric/Behavioral: Negative.      Physical exam limited due to virtual visit.  Patient AAOx4, speaking in full sentences, no distress noted.    Problem List Items Addressed This Visit        Cardiac/Vascular    Hypertension    Overview     Continue medication  Unable to check BP as VV           Current Assessment & Plan     As above           Dyspnea on exertion    Overview      Stress test was inconclusive- as he is having no CP will treat medically.  May end up proceeding to angiogram in coming months  Echo and Holter unremarkable.            Current Assessment & Plan     As above              Other    GASTON (obstructive sleep apnea)    Overview     Highly recommend that he resume CPAP therapy.  Bariatric surgery will help with correcting underlying problem is obesity is causative, but as surgery is pending indefinitely I do not recommend that he wait until after surgery to be reevaluated  Results pending but suspect his SoB may correlate with untreated GASTON           Current Assessment & Plan     As above

## 2022-09-01 ENCOUNTER — OFFICE VISIT (OUTPATIENT)
Dept: PRIMARY CARE CLINIC | Facility: CLINIC | Age: 32
End: 2022-09-01
Payer: MEDICARE

## 2022-09-01 VITALS
SYSTOLIC BLOOD PRESSURE: 136 MMHG | OXYGEN SATURATION: 98 % | RESPIRATION RATE: 18 BRPM | WEIGHT: 315 LBS | HEART RATE: 96 BPM | BODY MASS INDEX: 47.74 KG/M2 | DIASTOLIC BLOOD PRESSURE: 86 MMHG | HEIGHT: 68 IN

## 2022-09-01 DIAGNOSIS — L01.00 IMPETIGO: Primary | ICD-10-CM

## 2022-09-01 DIAGNOSIS — Z23 NEED FOR VACCINATION: ICD-10-CM

## 2022-09-01 DIAGNOSIS — I10 PRIMARY HYPERTENSION: ICD-10-CM

## 2022-09-01 DIAGNOSIS — M25.473 ANKLE EDEMA: ICD-10-CM

## 2022-09-01 DIAGNOSIS — E66.01 MORBID OBESITY WITH BMI OF 60.0-69.9, ADULT: ICD-10-CM

## 2022-09-01 DIAGNOSIS — R53.82 CHRONIC FATIGUE: ICD-10-CM

## 2022-09-01 LAB
LEFT EYE DM RETINOPATHY: NEGATIVE
RIGHT EYE DM RETINOPATHY: NEGATIVE

## 2022-09-01 PROCEDURE — 99213 OFFICE O/P EST LOW 20 MIN: CPT | Mod: S$PBB,,, | Performed by: INTERNAL MEDICINE

## 2022-09-01 PROCEDURE — 99999 PR PBB SHADOW E&M-EST. PATIENT-LVL V: ICD-10-PCS | Mod: PBBFAC,,, | Performed by: INTERNAL MEDICINE

## 2022-09-01 PROCEDURE — 99213 PR OFFICE/OUTPT VISIT, EST, LEVL III, 20-29 MIN: ICD-10-PCS | Mod: S$PBB,,, | Performed by: INTERNAL MEDICINE

## 2022-09-01 PROCEDURE — G0008 ADMIN INFLUENZA VIRUS VAC: HCPCS | Mod: PBBFAC,PN

## 2022-09-01 PROCEDURE — 99999 PR PBB SHADOW E&M-EST. PATIENT-LVL V: CPT | Mod: PBBFAC,,, | Performed by: INTERNAL MEDICINE

## 2022-09-01 PROCEDURE — 99215 OFFICE O/P EST HI 40 MIN: CPT | Mod: PBBFAC,PN | Performed by: INTERNAL MEDICINE

## 2022-09-01 RX ORDER — SEMAGLUTIDE 1.34 MG/ML
INJECTION, SOLUTION SUBCUTANEOUS
COMMUNITY
Start: 2022-08-18

## 2022-09-01 RX ORDER — KETOCONAZOLE 20 MG/ML
SHAMPOO, SUSPENSION TOPICAL
COMMUNITY
Start: 2022-08-18

## 2022-09-01 RX ORDER — FLUOXETINE HYDROCHLORIDE 20 MG/1
20 CAPSULE ORAL DAILY
COMMUNITY
Start: 2022-08-18

## 2022-09-01 RX ORDER — ERGOCALCIFEROL 1.25 MG/1
50000 CAPSULE ORAL
Qty: 12 CAPSULE | Refills: 3 | Status: SHIPPED | OUTPATIENT
Start: 2022-09-01 | End: 2022-11-21 | Stop reason: SDUPTHER

## 2022-09-01 RX ORDER — CEPHALEXIN 500 MG/1
500 TABLET ORAL 4 TIMES DAILY
Qty: 40 TABLET | Refills: 0 | Status: SHIPPED | OUTPATIENT
Start: 2022-09-01 | End: 2022-09-11

## 2022-09-01 RX ORDER — TRIAMCINOLONE ACETONIDE 5 MG/G
OINTMENT TOPICAL 2 TIMES DAILY
Qty: 15 G | Refills: 1 | Status: SHIPPED | OUTPATIENT
Start: 2022-09-01

## 2022-09-01 RX ORDER — MUPIROCIN 20 MG/G
OINTMENT TOPICAL 2 TIMES DAILY
Qty: 22 G | Refills: 1 | Status: SHIPPED | OUTPATIENT
Start: 2022-09-01 | End: 2023-03-07 | Stop reason: SDUPTHER

## 2022-09-01 RX ORDER — FUROSEMIDE 40 MG/1
40 TABLET ORAL 2 TIMES DAILY
Qty: 60 TABLET | Refills: 11 | Status: SHIPPED | OUTPATIENT
Start: 2022-09-01 | End: 2023-09-01

## 2022-09-01 RX ORDER — COLCHICINE 0.6 MG/1
TABLET ORAL
COMMUNITY
Start: 2022-08-17 | End: 2022-12-13 | Stop reason: SDUPTHER

## 2022-09-01 NOTE — PROGRESS NOTES
Subjective:       Patient ID: Mickey D Jeansonne is a 31 y.o. male.    Chief Complaint: Follow-up (3 month) and Otalgia (Both ears - has some scabs since Monday)    HPI   patient visit today for routine follow-up and also complained of skin lesion in the ear lobes bilaterally itching scaly and bleeding sometime her no other skin lesion patient also had a sleep study with CPAP results is still pending he denies short of breath chest pain dyspnea with exertion.  Weight with diet not successful not able to do exercise  Review of Systems    Objective:      Physical Exam  Vitals and nursing note reviewed.   Constitutional:       General: He is not in acute distress.     Appearance: He is well-developed.   HENT:      Head: Normocephalic and atraumatic.      Right Ear: External ear normal.      Left Ear: External ear normal.      Nose: Nose normal.   Eyes:      Extraocular Movements: Extraocular movements intact.      Conjunctiva/sclera: Conjunctivae normal.      Pupils: Pupils are equal, round, and reactive to light.   Neck:      Thyroid: No thyromegaly.   Cardiovascular:      Rate and Rhythm: Normal rate and regular rhythm.      Heart sounds: Normal heart sounds. No murmur heard.    No friction rub. No gallop.   Pulmonary:      Effort: Pulmonary effort is normal. No respiratory distress.      Breath sounds: Normal breath sounds. No wheezing.   Abdominal:      General: Bowel sounds are normal. There is no distension.      Palpations: Abdomen is soft.      Tenderness: There is no abdominal tenderness.   Musculoskeletal:         General: No tenderness or deformity. Normal range of motion.      Cervical back: Normal range of motion and neck supple.   Lymphadenopathy:      Cervical: No cervical adenopathy.   Skin:     General: Skin is warm and dry.      Findings: No erythema or rash.      Comments: Multiple small scaly a pruritic with superficial bleeding and scabs in both auricles no other skin rash   Neurological:       Mental Status: He is alert and oriented to person, place, and time.   Psychiatric:         Thought Content: Thought content normal.         Judgment: Judgment normal.       Assessment:       1. Impetigo    2. Need for vaccination    3. Ankle edema    4. Primary hypertension    5. Chronic fatigue    6. Morbid obesity with BMI of 60.0-69.9, adult          Plan:       Impetigo  -     mupirocin (BACTROBAN) 2 % ointment; Apply topically 2 (two) times daily.  Dispense: 22 g; Refill: 1  -     triamcinolone (KENALOG) 0.5 % ointment; Apply topically 2 (two) times daily.  Dispense: 15 g; Refill: 1  -     cephalexin (KEFTAB) 500 mg tablet; Take 1 tablet (500 mg total) by mouth 4 (four) times daily. for 10 days  Dispense: 40 tablet; Refill: 0    Need for vaccination  -     Influenza - Quadrivalent *Preferred* (6 months+) (PF)    Ankle edema  -     furosemide (LASIX) 40 MG tablet; Take 1 tablet (40 mg total) by mouth 2 (two) times daily.  Dispense: 60 tablet; Refill: 11    Primary hypertension  -     furosemide (LASIX) 40 MG tablet; Take 1 tablet (40 mg total) by mouth 2 (two) times daily.  Dispense: 60 tablet; Refill: 11    Chronic fatigue  -     ergocalciferol (ERGOCALCIFEROL) 50,000 unit Cap; Take 1 capsule (50,000 Units total) by mouth every 7 days.  Dispense: 12 capsule; Refill: 3    Morbid obesity with BMI of 60.0-69.9, adult  Comments:  A discussed with patient diet exercise to lose weight and so also bariatric surgery patient will thing about it  Orders:  -     ergocalciferol (ERGOCALCIFEROL) 50,000 unit Cap; Take 1 capsule (50,000 Units total) by mouth every 7 days.  Dispense: 12 capsule; Refill: 3      Medication List with Changes/Refills   New Medications    CEPHALEXIN (KEFTAB) 500 MG TABLET    Take 1 tablet (500 mg total) by mouth 4 (four) times daily. for 10 days    MUPIROCIN (BACTROBAN) 2 % OINTMENT    Apply topically 2 (two) times daily.    TRIAMCINOLONE (KENALOG) 0.5 % OINTMENT    Apply topically 2 (two) times  daily.   Current Medications    ALLOPURINOL (ZYLOPRIM) 300 MG TABLET    Take 1 tablet (300 mg total) by mouth once daily.    AMLODIPINE-BENAZEPRIL 5-20 MG (LOTREL) 5-20 MG PER CAPSULE    Take 1 capsule by mouth 2 (two) times a day.    ATORVASTATIN (LIPITOR) 80 MG TABLET    Take 1 tablet (80 mg total) by mouth once daily.    BIKTARVY -25 MG PER TABLET    once daily.    BLOOD PRESSURE MONITOR (EAL EASE) XL ARM SIZE (OP)    by Other route as needed for High Blood Pressure.    CHLORHEXIDINE (HIBICLENS) 4 % EXTERNAL LIQUID    Apply topically daily as needed (ski infection).    CLOBETASOL (TEMOVATE) 0.05 % EXTERNAL SOLUTION    SMARTSIG:Topical Morning-Evening    CLOTRIMAZOLE-BETAMETHASONE 1-0.05% (LOTRISONE) CREAM    Apply topically 2 (two) times daily.    COLCHICINE (COLCRYS) 0.6 MG TABLET    TAKE TWO TABLETS BY MOUTH AT first sign OF flare up followed by ONE TABLET IN ONE hour then on day2 TAKE ONE TABLET BY MOUTH TWICE DAILY until flare stops.    ESCITALOPRAM OXALATE (LEXAPRO) 20 MG TABLET    Take 20 mg by mouth once daily.    FLUOXETINE 20 MG CAPSULE    Take 20 mg by mouth once daily.    GABAPENTIN (NEURONTIN) 300 MG CAPSULE    Take 300 mg by mouth 3 (three) times daily.    IMIQUIMOD (ALDARA) 5 % CREAM    Apply topically.    INDOMETHACIN (INDOCIN SR) 75 MG CPSR CR CAPSULE    Take 1 capsule (75 mg total) by mouth 2 (two) times daily. 1 po BID for gout    KETOCONAZOLE (NIZORAL) 2 % SHAMPOO    Apply Once weekly    METFORMIN (GLUCOPHAGE) 500 MG TABLET    Take 500 mg by mouth.    METOPROLOL SUCCINATE (TOPROL-XL) 25 MG 24 HR TABLET    Take 0.5 tablets (12.5 mg total) by mouth once daily.    MUPIROCIN (BACTROBAN) 2 % OINTMENT    Apply topically 2 (two) times daily.    NEOMYCIN-POLYMYXIN-HYDROCORTISONE (CORTISPORIN) 3.5-10,000-1 MG/ML-UNIT/ML-% OTIC SUSPENSION    Place 3 drops into both ears 3 (three) times daily.    OZEMPIC 0.25 MG OR 0.5 MG(2 MG/1.5 ML) PEN INJECTOR    Week ONE through week FOUR 0.25 mg once  weekly TIME FOUR weeks Adm UNDER SKIN weekly    TRAZODONE (DESYREL) 100 MG TABLET    Take 100-200 mg by mouth nightly as needed.   Changed and/or Refilled Medications    Modified Medication Previous Medication    ERGOCALCIFEROL (ERGOCALCIFEROL) 50,000 UNIT CAP ergocalciferol (ERGOCALCIFEROL) 50,000 unit Cap       Take 1 capsule (50,000 Units total) by mouth every 7 days.    Take 50,000 Units by mouth every 7 days.    FUROSEMIDE (LASIX) 40 MG TABLET furosemide (LASIX) 40 MG tablet       Take 1 tablet (40 mg total) by mouth 2 (two) times daily.    Take 1 tablet (40 mg total) by mouth 2 (two) times daily.

## 2022-09-14 ENCOUNTER — TELEPHONE (OUTPATIENT)
Dept: PRIMARY CARE CLINIC | Facility: CLINIC | Age: 32
End: 2022-09-14
Payer: MEDICARE

## 2022-09-14 NOTE — TELEPHONE ENCOUNTER
RANULFO faxed to [facility] at [phone] for the patients diabetic eye exam

## 2022-09-21 ENCOUNTER — PATIENT OUTREACH (OUTPATIENT)
Dept: ADMINISTRATIVE | Facility: HOSPITAL | Age: 32
End: 2022-09-21
Payer: MEDICARE

## 2022-09-27 ENCOUNTER — PATIENT MESSAGE (OUTPATIENT)
Dept: PRIMARY CARE CLINIC | Facility: CLINIC | Age: 32
End: 2022-09-27
Payer: MEDICARE

## 2022-10-31 ENCOUNTER — PATIENT MESSAGE (OUTPATIENT)
Dept: ADMINISTRATIVE | Facility: OTHER | Age: 32
End: 2022-10-31
Payer: MEDICARE

## 2022-12-01 DIAGNOSIS — E11.9 TYPE 2 DIABETES MELLITUS WITHOUT COMPLICATION: ICD-10-CM

## 2022-12-09 ENCOUNTER — PATIENT MESSAGE (OUTPATIENT)
Dept: PRIMARY CARE CLINIC | Facility: CLINIC | Age: 32
End: 2022-12-09
Payer: MEDICARE

## 2022-12-09 DIAGNOSIS — R05.1 ACUTE COUGH: Primary | ICD-10-CM

## 2022-12-12 ENCOUNTER — OFFICE VISIT (OUTPATIENT)
Dept: PRIMARY CARE CLINIC | Facility: CLINIC | Age: 32
End: 2022-12-12
Payer: MEDICARE

## 2022-12-12 DIAGNOSIS — M10.9 GOUTY ARTHRITIS OF BOTH ANKLES: ICD-10-CM

## 2022-12-12 DIAGNOSIS — J06.9 URI WITH COUGH AND CONGESTION: ICD-10-CM

## 2022-12-12 DIAGNOSIS — M10.9 GOUTY ARTHRITIS OF HAND: Primary | ICD-10-CM

## 2022-12-12 PROCEDURE — 99213 OFFICE O/P EST LOW 20 MIN: CPT | Mod: 95,,, | Performed by: INTERNAL MEDICINE

## 2022-12-12 PROCEDURE — 99213 PR OFFICE/OUTPT VISIT, EST, LEVL III, 20-29 MIN: ICD-10-PCS | Mod: 95,,, | Performed by: INTERNAL MEDICINE

## 2022-12-13 RX ORDER — PROMETHAZINE HYDROCHLORIDE AND DEXTROMETHORPHAN HYDROBROMIDE 6.25; 15 MG/5ML; MG/5ML
5 SYRUP ORAL EVERY 4 HOURS PRN
Qty: 120 ML | Refills: 0 | Status: SHIPPED | OUTPATIENT
Start: 2022-12-13 | End: 2022-12-23

## 2022-12-13 RX ORDER — COLCHICINE 0.6 MG/1
TABLET ORAL
Qty: 60 TABLET | Refills: 1 | Status: SHIPPED | OUTPATIENT
Start: 2022-12-13 | End: 2023-02-16 | Stop reason: SDUPTHER

## 2022-12-13 RX ORDER — INDOMETHACIN 75 MG/1
75 CAPSULE, EXTENDED RELEASE ORAL 2 TIMES DAILY PRN
Qty: 40 CAPSULE | Refills: 0 | Status: SHIPPED | OUTPATIENT
Start: 2022-12-13

## 2022-12-13 NOTE — PROGRESS NOTES
Subjective:    The patient location is: home  The chief complaint leading to consultation is: left hand swollen red the    Visit type: audiovisual the    Face to Face time with patient: 12   minutes of total time spent on the encounter, which includes face to face time and non-face to face time preparing to see the patient (eg, review of tests), Obtaining and/or reviewing separately obtained history, Documenting clinical information in the electronic or other health record, Independently interpreting results (not separately reported) and communicating results to the patient/family/caregiver, or Care coordination (not separately reported).         Each patient to whom he or she provides medical services by telemedicine is:  (1) informed of the relationship between the physician and patient and the respective role of any other health care provider with respect to management of the patient; and (2) notified that he or she may decline to receive medical services by telemedicine and may withdraw from such care at any time.    Notes:     Patient ID: Mickey D Jeansonne is a 32 y.o. male.    Chief Complaint: No chief complaint on file.    HPI  pt with h/o gout today c/o his left hand turn red and swollen since Friday 3 days ago not better no other joint pain no trauma no fever chill he I also having cold with coughing congestion no fever chill n sob cp by a  Review of Systems    Objective:      Physical Exam  Constitutional:       General: He is not in acute distress.     Appearance: Normal appearance. He is obese.   HENT:      Head: Atraumatic.   Eyes:      Extraocular Movements: Extraocular movements intact.   Pulmonary:      Effort: Pulmonary effort is normal.   Musculoskeletal:         General: Tenderness (tendreness and erythematous dorsum left hand) present.   Neurological:      General: No focal deficit present.      Mental Status: He is alert and oriented to person, place, and time.   Psychiatric:         Mood and  Affect: Mood normal.         Thought Content: Thought content normal.         Judgment: Judgment normal.       Assessment:       1. Gouty arthritis of hand    2. Gouty arthritis of both ankles    3. URI with cough and congestion          Plan:       Gouty arthritis of hand  -     colchicine (COLCRYS) 0.6 mg tablet; TAKE TWO TABLETS BY MOUTH AT first sign OF flare up followed by ONE TABLET IN ONE hour then on day2 TAKE ONE TABLET BY MOUTH TWICE DAILY until flare stops.  Dispense: 60 tablet; Refill: 1  -     indomethacin (INDOCIN SR) 75 mg CpSR CR capsule; Take 1 capsule (75 mg total) by mouth 2 (two) times daily as needed (gout attack). 1 po BID for gout  Dispense: 40 capsule; Refill: 0    Gouty arthritis of both ankles    URI with cough and congestion  Comments:  pt jesus try OTC meds and tylenol for pain fever        Medication List with Changes/Refills   Current Medications    ALLOPURINOL (ZYLOPRIM) 300 MG TABLET    Take 1 tablet (300 mg total) by mouth once daily.    AMLODIPINE-BENAZEPRIL 5-20 MG (LOTREL) 5-20 MG PER CAPSULE    Take 1 capsule by mouth 2 (two) times a day.    ATORVASTATIN (LIPITOR) 80 MG TABLET    Take 1 tablet (80 mg total) by mouth once daily.    BIKTARVY -25 MG PER TABLET    once daily.    BLOOD PRESSURE MONITOR ([facility] EASE) XL ARM SIZE (OP)    by Other route as needed for High Blood Pressure.    CHLORHEXIDINE (HIBICLENS) 4 % EXTERNAL LIQUID    Apply topically daily as needed (ski infection).    CLOBETASOL (TEMOVATE) 0.05 % EXTERNAL SOLUTION    SMARTSIG:Topical Morning-Evening    CLOTRIMAZOLE-BETAMETHASONE 1-0.05% (LOTRISONE) CREAM    Apply topically 2 (two) times daily.    ERGOCALCIFEROL (ERGOCALCIFEROL) 50,000 UNIT CAP    Take 1 capsule (50,000 Units total) by mouth every 7 days.    ESCITALOPRAM OXALATE (LEXAPRO) 20 MG TABLET    Take 20 mg by mouth once daily.    FLUOXETINE 20 MG CAPSULE    Take 20 mg by mouth once daily.    FUROSEMIDE (LASIX) 40 MG TABLET    Take 1 tablet (40 mg total)  by mouth 2 (two) times daily.    GABAPENTIN (NEURONTIN) 300 MG CAPSULE    Take 300 mg by mouth 3 (three) times daily.    IMIQUIMOD (ALDARA) 5 % CREAM    Apply topically.    KETOCONAZOLE (NIZORAL) 2 % SHAMPOO    Apply Once weekly    METFORMIN (GLUCOPHAGE) 500 MG TABLET    Take 500 mg by mouth.    METOPROLOL SUCCINATE (TOPROL-XL) 25 MG 24 HR TABLET    Take 0.5 tablets (12.5 mg total) by mouth once daily.    MUPIROCIN (BACTROBAN) 2 % OINTMENT    Apply topically 2 (two) times daily.    MUPIROCIN (BACTROBAN) 2 % OINTMENT    Apply topically 2 (two) times daily.    NEOMYCIN-POLYMYXIN-HYDROCORTISONE (CORTISPORIN) 3.5-10,000-1 MG/ML-UNIT/ML-% OTIC SUSPENSION    Place 3 drops into both ears 3 (three) times daily.    OZEMPIC 0.25 MG OR 0.5 MG(2 MG/1.5 ML) PEN INJECTOR    Week ONE through week FOUR 0.25 mg once weekly TIME FOUR weeks Adm UNDER SKIN weekly    TRAZODONE (DESYREL) 100 MG TABLET    Take 100-200 mg by mouth nightly as needed.    TRIAMCINOLONE (KENALOG) 0.5 % OINTMENT    Apply topically 2 (two) times daily.   Changed and/or Refilled Medications    Modified Medication Previous Medication    COLCHICINE (COLCRYS) 0.6 MG TABLET colchicine (COLCRYS) 0.6 mg tablet       TAKE TWO TABLETS BY MOUTH AT first sign OF flare up followed by ONE TABLET IN ONE hour then on day2 TAKE ONE TABLET BY MOUTH TWICE DAILY until flare stops.    TAKE TWO TABLETS BY MOUTH AT first sign OF flare up followed by ONE TABLET IN ONE hour then on day2 TAKE ONE TABLET BY MOUTH TWICE DAILY until flare stops.    INDOMETHACIN (INDOCIN SR) 75 MG CPSR CR CAPSULE indomethacin (INDOCIN SR) 75 mg CpSR CR capsule       Take 1 capsule (75 mg total) by mouth 2 (two) times daily as needed (gout attack). 1 po BID for gout    Take 1 capsule (75 mg total) by mouth 2 (two) times daily. 1 po BID for gout

## 2022-12-15 ENCOUNTER — TELEPHONE (OUTPATIENT)
Dept: PRIMARY CARE CLINIC | Facility: CLINIC | Age: 32
End: 2022-12-15
Payer: MEDICARE

## 2022-12-15 ENCOUNTER — PATIENT MESSAGE (OUTPATIENT)
Dept: PRIMARY CARE CLINIC | Facility: CLINIC | Age: 32
End: 2022-12-15
Payer: MEDICARE

## 2022-12-15 NOTE — TELEPHONE ENCOUNTER
Called [facility] and they do have his cough medication, it is just not covered by his Insurance. The medication cost about $20. Called patient and he will go and  medication.

## 2023-01-26 DIAGNOSIS — I10 HYPERTENSION: ICD-10-CM

## 2023-02-10 ENCOUNTER — PATIENT MESSAGE (OUTPATIENT)
Dept: PRIMARY CARE CLINIC | Facility: CLINIC | Age: 33
End: 2023-02-10
Payer: MEDICARE

## 2023-02-16 ENCOUNTER — PATIENT MESSAGE (OUTPATIENT)
Dept: ADMINISTRATIVE | Facility: HOSPITAL | Age: 33
End: 2023-02-16
Payer: MEDICARE

## 2023-02-16 DIAGNOSIS — E11.9 TYPE 2 DIABETES MELLITUS WITHOUT COMPLICATION, UNSPECIFIED WHETHER LONG TERM INSULIN USE: ICD-10-CM

## 2023-03-07 ENCOUNTER — TELEPHONE (OUTPATIENT)
Dept: PRIMARY CARE CLINIC | Facility: CLINIC | Age: 33
End: 2023-03-07
Payer: MEDICARE

## 2023-03-07 DIAGNOSIS — L01.00 IMPETIGO: ICD-10-CM

## 2023-03-07 RX ORDER — AMOXICILLIN AND CLAVULANATE POTASSIUM 875; 125 MG/1; MG/1
1 TABLET, FILM COATED ORAL EVERY 12 HOURS
Qty: 20 TABLET | Refills: 0 | Status: SHIPPED | OUTPATIENT
Start: 2023-03-07

## 2023-03-07 RX ORDER — MUPIROCIN 20 MG/G
OINTMENT TOPICAL 2 TIMES DAILY
Qty: 22 G | Refills: 1 | Status: SHIPPED | OUTPATIENT
Start: 2023-03-07

## 2023-03-07 NOTE — TELEPHONE ENCOUNTER
----- Message from Selma Aguila sent at 3/7/2023  9:51 AM CST -----  Contact: Rufina with [facility] phone [phone] ext [phone]  Calling to inquire about the patient's CPAP machine. Please advise. Thanks.

## 2023-05-01 ENCOUNTER — TELEPHONE (OUTPATIENT)
Dept: PRIMARY CARE CLINIC | Facility: CLINIC | Age: 33
End: 2023-05-01
Payer: MEDICARE

## 2023-05-01 DIAGNOSIS — G47.33 OSA (OBSTRUCTIVE SLEEP APNEA): Primary | ICD-10-CM

## 2023-05-01 NOTE — TELEPHONE ENCOUNTER
----- Message from Selma Aguila sent at 5/1/2023  9:10 AM CDT -----  Contact: Josefina with [facility] phone [phone]  Calling to inquire about the orders for CPAP and supplies. Please advise. Thanks.

## 2023-05-01 NOTE — TELEPHONE ENCOUNTER
[facility] with [facility] in regards to the patient needing orders for CPAP and supplies. Message was sent to Dr. Chaparro and he placed the orders. Called to get the fax number to [facility] and they gave me [phone]. Patient orders were faxed over today 5/1/2023 at 2:23 pm.

## 2023-07-11 ENCOUNTER — TELEPHONE (OUTPATIENT)
Dept: PRIMARY CARE CLINIC | Facility: CLINIC | Age: 33
End: 2023-07-11
Payer: MEDICARE

## 2023-07-11 NOTE — TELEPHONE ENCOUNTER
----- Message from Chuy Nunez sent at 7/10/2023  4:37 PM CDT -----  Regarding: AutoPap  Contact: [phone]  Good evening! Please update Rx to include all supplies,  Disposable Filter. Thanks! Sandeep

## 2023-08-02 DIAGNOSIS — E11.9 TYPE 2 DIABETES MELLITUS WITHOUT COMPLICATION: ICD-10-CM

## 2023-08-14 ENCOUNTER — PATIENT MESSAGE (OUTPATIENT)
Dept: ADMINISTRATIVE | Facility: HOSPITAL | Age: 33
End: 2023-08-14
Payer: MEDICARE

## 2023-08-16 ENCOUNTER — PATIENT OUTREACH (OUTPATIENT)
Dept: ADMINISTRATIVE | Facility: HOSPITAL | Age: 33
End: 2023-08-16
Payer: MEDICARE

## 2023-08-16 DIAGNOSIS — E66.01 MORBID OBESITY WITH BMI OF 60.0-69.9, ADULT: Primary | ICD-10-CM

## 2023-08-16 DIAGNOSIS — I10 PRIMARY HYPERTENSION: ICD-10-CM

## 2023-08-16 NOTE — PROGRESS NOTES
Health Maintenance Due   Topic Date Due    TETANUS VACCINE  12/24/2013    COVID-19 Vaccine (3 - Mixed Product series) 12/01/2021    Pneumococcal Vaccines (Age 0-64) (2 - PPSV23 or PCV20) 03/09/2022    Foot Exam  05/12/2023    Diabetes Urine Screening  05/18/2023    Eye Exam  09/01/2023    Lipid Panel  09/07/2023    Hemoglobin A1c  09/08/2023     Immunizations - reviewed and updated   Care Everywhere - triggered   Care Teams - updated   Outreach - Portal message sent to patient in regards to diabetic labs and eye exam. Patient has a lab appointment scheduled for 8/17/2023.

## 2023-09-18 ENCOUNTER — PATIENT MESSAGE (OUTPATIENT)
Dept: PRIMARY CARE CLINIC | Facility: CLINIC | Age: 33
End: 2023-09-18
Payer: MEDICARE

## 2023-10-18 ENCOUNTER — PATIENT MESSAGE (OUTPATIENT)
Dept: CARDIOLOGY | Facility: CLINIC | Age: 33
End: 2023-10-18
Payer: MEDICARE

## 2024-01-12 ENCOUNTER — PATIENT OUTREACH (OUTPATIENT)
Dept: ADMINISTRATIVE | Facility: HOSPITAL | Age: 34
End: 2024-01-12
Payer: MEDICARE

## 2024-01-12 NOTE — PROGRESS NOTES
Health Maintenance Due   Topic Date Due    TETANUS VACCINE  12/24/2013    Pneumococcal Vaccines (Age 0-64) (2 - PPSV23 or PCV20) 03/09/2022    Foot Exam  05/12/2023    COVID-19 Vaccine (3 - 2023-24 season) 09/01/2023    Eye Exam  09/01/2023    Hemoglobin A1c  11/17/2023        Chart review done.   HM updated.   Immunizations reviewed & updated.   Care Everywhere updated.

## 2024-02-19 ENCOUNTER — PATIENT MESSAGE (OUTPATIENT)
Dept: ADMINISTRATIVE | Facility: HOSPITAL | Age: 34
End: 2024-02-19
Payer: MEDICARE

## 2024-02-19 ENCOUNTER — PATIENT OUTREACH (OUTPATIENT)
Dept: ADMINISTRATIVE | Facility: HOSPITAL | Age: 34
End: 2024-02-19
Payer: MEDICARE

## 2024-02-19 DIAGNOSIS — E11.9 TYPE 2 DIABETES MELLITUS WITHOUT COMPLICATION, UNSPECIFIED WHETHER LONG TERM INSULIN USE: Primary | ICD-10-CM

## 2024-02-19 NOTE — PROGRESS NOTES
Health Maintenance Due   Topic Date Due    TETANUS VACCINE  2013    Pneumococcal Vaccines (Age 0-64) (2 of 2 - PPSV23 or PCV20) 2022    Foot Exam  2023    Eye Exam  2023    Hemoglobin A1c  2024     Population Health Chart Review & Patient Outreach Details      Further Action Needed If Patient Returns Outreach:      Patient overdue for A1C as of 2024. Order placed.    Patient overdue for diabetic eye exam. Order for diabetic eye screening photo placed. Last eye exam done at [facility]. Portal message sent to patient in regards to scheduling.       Updates Requested / Reviewed:     [x]  Care Everywhere    []     []  External Sources ([facility], [facility], [facility], etc.)    [] [facility]   [] [facility]   [] Other:    []  Care Team Updated   []  Removed  or Duplicate Orders   [x]  Immunization Reconciliation Completed / Queried    [x] [place]   [] [place]   [] [place]   [] [place]      Health Maintenance Topics Addressed and Outreach Outcomes / Actions Taken:             Breast Cancer Screening []  Mammogram Order Placed    []  Mammogram Screening Scheduled    []  External Records Requested & Care Team Updated if Applicable    []  External Records Uploaded & Care Team Updated if Applicable    []  Pt Declined Scheduling Mammogram    []  Pt Will Schedule with External Provider / Order Routed & Care Team Updated if Applicable              Cervical Cancer Screening []  Pap Smear Scheduled in Primary Care or OBGYN    []  External Records Requested & Care Team Updated if Applicable       []  External Records Uploaded, Care Team Updated, & History Updated if Applicable    []  Patient Declined Scheduling Pap Smear    []  Patient Will Schedule with External Provider & Care Team Updated if Applicable                  Colorectal Cancer Screening []  Colonoscopy Case Request / Referral / Home Test Order Placed    []  External Records Requested & Care Team Updated if Applicable    []   External Records Uploaded, Care Team Updated, & History Updated if Applicable    []  Patient Declined Completing Colon Cancer Screening    []  Patient Will Schedule with External Provider & Care Team Updated if Applicable    []  Fit Kit Mailed (add the SmartPhrase under additional notes)    []  Reminded Patient to Complete Home Test                Diabetic Eye Exam [x]  Eye Exam Screening Order Placed    []  Eye Camera Scheduled or Optometry/Ophthalmology Referral Placed    []  External Records Requested & Care Team Updated if Applicable    []  External Records Uploaded, Care Team Updated, & History Updated if Applicable    []  Patient Declined Scheduling Eye Exam    []  Patient Will Schedule with External Provider & Care Team Updated if Applicable             Blood Pressure Control []  Primary Care Follow Up Visit Scheduled     []  Remote Blood Pressure Reading Captured    []  Patient Declined Remote Reading or Scheduling Appt - Escalated to PCP    []  Patient Will Call Back or Send Portal Message with Reading                 HbA1c & Other Labs [x]  Overdue Lab(s) Ordered    []  Overdue Lab(s) Scheduled    []  External Records Uploaded & Care Team Updated if Applicable    []  Primary Care Follow Up Visit Scheduled     []  Reminded Patient to Complete A1c Home Test    []  Patient Declined Scheduling Labs or Will Call Back to Schedule    []  Patient Will Schedule with External Provider / Order Routed, & Care Team Updated if Applicable           Primary Care Appointment []  Primary Care Appt Scheduled    []  Patient Declined Scheduling or Will Call Back to Schedule    []  Pt Established with External Provider, Updated Care Team, & Informed Pt to Notify Payor if Applicable           Medication Adherence /    Statin Use []  Primary Care Appointment Scheduled    []  Patient Reminded to  Prescription    []  Patient Declined, Provider Notified if Needed    []  Sent Provider Message to Review to Evaluate Pt for  Statin, Add Exclusion Dx Codes, Document   Exclusion in Problem List, Change Statin Intensity Level to Moderate or High Intensity if Applicable                Osteoporosis Screening []  Dexa Order Placed    []  Dexa Appointment Scheduled    []  External Records Requested & Care Team Updated    []  External Records Uploaded, Care Team Updated, & History Updated if Applicable    []  Patient Declined Scheduling Dexa or Will Call Back to Schedule    []  Patient Will Schedule with External Provider / Order Routed & Care Team Updated if Applicable       Additional Notes:

## 2024-04-11 ENCOUNTER — PATIENT OUTREACH (OUTPATIENT)
Dept: ADMINISTRATIVE | Facility: HOSPITAL | Age: 34
End: 2024-04-11
Payer: MEDICARE

## 2024-04-11 PROBLEM — K92.2 GI BLEED: Status: ACTIVE | Noted: 2024-04-11

## 2024-04-11 PROBLEM — K92.0 HEMATEMESIS WITH NAUSEA: Status: ACTIVE | Noted: 2024-04-11

## 2024-04-11 PROBLEM — B20 HIV (HUMAN IMMUNODEFICIENCY VIRUS INFECTION): Status: ACTIVE | Noted: 2024-04-11

## 2024-04-11 PROBLEM — E11.00 HYPEROSMOLAR HYPERGLYCEMIC STATE (HHS): Status: ACTIVE | Noted: 2024-04-11

## 2024-04-11 PROBLEM — Z21 HIV (HUMAN IMMUNODEFICIENCY VIRUS INFECTION): Status: ACTIVE | Noted: 2024-04-11

## 2024-04-11 NOTE — PROGRESS NOTES
Health Maintenance Due   Topic Date Due    TETANUS VACCINE  12/24/2013    Pneumococcal Vaccines (Age 0-64) (2 of 2 - PPSV23 or PCV20) 03/09/2022    Foot Exam  05/12/2023    Eye Exam  09/01/2023    Hemoglobin A1c  02/17/2024    COVID-19 Vaccine (4 - 2023-24 season) 02/28/2024     Immunizations - reviewed and updated   Care Everywhere - triggered   Care Teams -   Outreach - [facility] panel list reviewed. Patient due for annual visit with PCP. Annual appointment scheduled for 6/17/2024  Diabetic eye exam due, added to appointment notes

## 2024-04-12 ENCOUNTER — TELEPHONE (OUTPATIENT)
Dept: DIABETES | Facility: CLINIC | Age: 34
End: 2024-04-12
Payer: MEDICARE

## 2024-04-12 ENCOUNTER — PATIENT MESSAGE (OUTPATIENT)
Dept: SURGERY | Facility: CLINIC | Age: 34
End: 2024-04-12
Payer: MEDICARE

## 2024-04-12 ENCOUNTER — TELEPHONE (OUTPATIENT)
Dept: SURGERY | Facility: CLINIC | Age: 34
End: 2024-04-12
Payer: MEDICARE

## 2024-04-12 NOTE — TELEPHONE ENCOUNTER
----- Message from Krystin Smith sent at 4/12/2024 11:39 AM CDT -----  Contact: pt @ [phone]  MICKEY JEANSONNE calling regarding Appointment Access  (message) for #pt is calling to get np appt pt has referral in chart. Asking for call back

## 2024-04-12 NOTE — TELEPHONE ENCOUNTER
----- Message from Phyllis Mora sent at 4/12/2024 11:41 AM CDT -----  Type:  Sooner Apoointment Request    Caller is requesting a sooner appointment.  Caller declined first available appointment listed below.  Caller will not accept being placed on the waitlist and is requesting a message be sent to doctor.  Name of Caller:pt   When is the first available appointment?  Symptoms:diabetes   Would the patient rather a call back or a response via [facility]? call  Best Call Back Number:[phone]  Additional Information: states they would like appt for first available

## 2024-04-12 NOTE — TELEPHONE ENCOUNTER
A call was received  from this patient re: a case request to schedule this patient for an EGD.The patient verbally consented to have the EGD procedure, and has been scheduled to have the EGD on the date of 04/24/2024. This patient was instructed to the Ozempic for one week prior to the scheduled EGD procedure date. Additionally, this was given the following EGD prep instructions:    EGD Prep Instructions    [facility]  [address]    You are scheduled for an EGD with Dr. Tahir Gillespie MD on 4/24/2024 at [facility], located [address]  Check in at the admit desk, first floor of the hospital (which is the building on the left).   You will receive a call 2-3 days before your EGD to tell you the time to arrive.  If you have not received a call by the day before your procedure, call the Endoscopy Lab at [phone]    Nothing to eat or drink after midnight before the procedure.  You MAY brush your teeth.    You MAY take your blood pressure, heart, and seizure medication on the morning of the procedure, with a SIP of water.  Hold ALL other medications until after the procedure.    If you are on blood thinners THAT YOU HAVE BEEN INSTRUCTED TO HOLD BY YOUR DOCTOR FOR THIS PROCEDURE, then do NOT take this the morning of your EGD.  Do NOT stop these medications on your own, they must be approved to be held by your doctor.  Your EGD can NOT be done if you are on these medications.  Examples of blood thinners include: Coumadin, Aggrenox, Plavix, Pradaxa, Reapro, Pletal, Xarelto, Ticagrelor, Brilinta, Eliquis, and high dose aspirin (325 mg).  You do not have to stop baby aspirin 81 mg.    Please make sure that you have a  home because you will receive an IV sedation. You may NOT take an uber, lyft, taxi, or bus home unless you have a responsible adult with you.     You will receive a call 2-3 days before your EGD to tell you the time to arrive.  If you  have not received a call by the day before your procedure, call the Endoscopy department at [phone].

## 2024-04-18 ENCOUNTER — PATIENT OUTREACH (OUTPATIENT)
Dept: ADMINISTRATIVE | Facility: HOSPITAL | Age: 34
End: 2024-04-18
Payer: MEDICARE

## 2024-04-18 ENCOUNTER — PATIENT MESSAGE (OUTPATIENT)
Dept: PRIMARY CARE CLINIC | Facility: CLINIC | Age: 34
End: 2024-04-18
Payer: MEDICARE

## 2024-04-18 NOTE — PROGRESS NOTES
Health Maintenance Due   Topic Date Due    TETANUS VACCINE  12/24/2013    Pneumococcal Vaccines (Age 0-64) (2 of 2 - PPSV23 or PCV20) 03/09/2022    Foot Exam  05/12/2023    Eye Exam  09/01/2023    COVID-19 Vaccine (4 - 2023-24 season) 02/28/2024        Chart review done.   HM updated.   Immunizations reviewed & updated.   Care Everywhere updated.

## 2024-04-24 ENCOUNTER — TELEPHONE (OUTPATIENT)
Dept: DIABETES | Facility: CLINIC | Age: 34
End: 2024-04-24
Payer: MEDICARE

## 2024-04-25 ENCOUNTER — TELEPHONE (OUTPATIENT)
Dept: DIABETES | Facility: CLINIC | Age: 34
End: 2024-04-25

## 2024-04-25 ENCOUNTER — PATIENT MESSAGE (OUTPATIENT)
Dept: DIABETES | Facility: CLINIC | Age: 34
End: 2024-04-25

## 2024-04-25 ENCOUNTER — OFFICE VISIT (OUTPATIENT)
Dept: DIABETES | Facility: CLINIC | Age: 34
End: 2024-04-25
Payer: MEDICARE

## 2024-04-25 ENCOUNTER — CLINICAL SUPPORT (OUTPATIENT)
Dept: DIABETES | Facility: CLINIC | Age: 34
End: 2024-04-25
Payer: MEDICARE

## 2024-04-25 VITALS
SYSTOLIC BLOOD PRESSURE: 142 MMHG | HEART RATE: 95 BPM | HEIGHT: 68 IN | BODY MASS INDEX: 47.74 KG/M2 | WEIGHT: 315 LBS | DIASTOLIC BLOOD PRESSURE: 88 MMHG | OXYGEN SATURATION: 96 %

## 2024-04-25 DIAGNOSIS — E11.65 TYPE 2 DIABETES MELLITUS WITH HYPERGLYCEMIA, WITH LONG-TERM CURRENT USE OF INSULIN: Primary | ICD-10-CM

## 2024-04-25 DIAGNOSIS — Z79.4 TYPE 2 DIABETES MELLITUS WITH DIABETIC POLYNEUROPATHY, WITH LONG-TERM CURRENT USE OF INSULIN: ICD-10-CM

## 2024-04-25 DIAGNOSIS — G47.33 OSA (OBSTRUCTIVE SLEEP APNEA): ICD-10-CM

## 2024-04-25 DIAGNOSIS — I10 PRIMARY HYPERTENSION: ICD-10-CM

## 2024-04-25 DIAGNOSIS — E66.01 CLASS 3 SEVERE OBESITY DUE TO EXCESS CALORIES WITH SERIOUS COMORBIDITY AND BODY MASS INDEX (BMI) OF 60.0 TO 69.9 IN ADULT: ICD-10-CM

## 2024-04-25 DIAGNOSIS — E11.42 TYPE 2 DIABETES MELLITUS WITH DIABETIC POLYNEUROPATHY, WITH LONG-TERM CURRENT USE OF INSULIN: ICD-10-CM

## 2024-04-25 DIAGNOSIS — E78.5 DYSLIPIDEMIA, GOAL LDL BELOW 100: ICD-10-CM

## 2024-04-25 DIAGNOSIS — E11.00 HYPEROSMOLAR HYPERGLYCEMIC STATE (HHS): ICD-10-CM

## 2024-04-25 DIAGNOSIS — Z79.4 TYPE 2 DIABETES MELLITUS WITH HYPERGLYCEMIA, WITH LONG-TERM CURRENT USE OF INSULIN: Primary | ICD-10-CM

## 2024-04-25 DIAGNOSIS — B20 HIV INFECTION, UNSPECIFIED SYMPTOM STATUS: ICD-10-CM

## 2024-04-25 DIAGNOSIS — Z71.9 HEALTH EDUCATION/COUNSELING: ICD-10-CM

## 2024-04-25 PROBLEM — E66.813 CLASS 3 SEVERE OBESITY DUE TO EXCESS CALORIES WITH SERIOUS COMORBIDITY AND BODY MASS INDEX (BMI) OF 60.0 TO 69.9 IN ADULT: Status: ACTIVE | Noted: 2018-09-11

## 2024-04-25 LAB — GLUCOSE SERPL-MCNC: 273 MG/DL (ref 70–110)

## 2024-04-25 PROCEDURE — 99215 OFFICE O/P EST HI 40 MIN: CPT | Mod: PBBFAC,PN | Performed by: NURSE PRACTITIONER

## 2024-04-25 PROCEDURE — 99999 PR PBB SHADOW E&M-EST. PATIENT-LVL V: CPT | Mod: PBBFAC,,, | Performed by: NURSE PRACTITIONER

## 2024-04-25 PROCEDURE — 99999PBSHW POCT GLUCOSE, HAND-HELD DEVICE: Mod: PBBFAC,,,

## 2024-04-25 PROCEDURE — 99215 OFFICE O/P EST HI 40 MIN: CPT | Mod: S$PBB,ICN,, | Performed by: NURSE PRACTITIONER

## 2024-04-25 PROCEDURE — 82962 GLUCOSE BLOOD TEST: CPT | Mod: PBBFAC,PN | Performed by: NURSE PRACTITIONER

## 2024-04-25 RX ORDER — PEN NEEDLE, DIABETIC 30 GX3/16"
1 NEEDLE, DISPOSABLE MISCELLANEOUS 4 TIMES DAILY
Qty: 150 EACH | Refills: 11 | Status: SHIPPED | OUTPATIENT
Start: 2024-04-25

## 2024-04-25 RX ORDER — METFORMIN HYDROCHLORIDE 1000 MG/1
1000 TABLET ORAL 2 TIMES DAILY WITH MEALS
Qty: 180 TABLET | Refills: 3 | Status: SHIPPED | OUTPATIENT
Start: 2024-04-25 | End: 2025-04-25

## 2024-04-25 RX ORDER — SEMAGLUTIDE 0.68 MG/ML
0.25 INJECTION, SOLUTION SUBCUTANEOUS
Qty: 1 EACH | Refills: 6 | Status: SHIPPED | OUTPATIENT
Start: 2024-04-25 | End: 2024-04-30

## 2024-04-25 RX ORDER — INSULIN ASPART 100 [IU]/ML
INJECTION, SOLUTION INTRAVENOUS; SUBCUTANEOUS
Qty: 15 ML | Refills: 6 | Status: SHIPPED | OUTPATIENT
Start: 2024-04-25

## 2024-04-25 RX ORDER — INSULIN PUMP SYRINGE, 3 ML
EACH MISCELLANEOUS
Qty: 1 EACH | Refills: 0 | Status: SHIPPED | OUTPATIENT
Start: 2024-04-25 | End: 2024-04-29 | Stop reason: SDUPTHER

## 2024-04-25 RX ORDER — LANCETS
EACH MISCELLANEOUS
Qty: 150 EACH | Refills: 11 | Status: SHIPPED | OUTPATIENT
Start: 2024-04-25 | End: 2024-04-29 | Stop reason: SDUPTHER

## 2024-04-25 RX ORDER — GLUCAGON 3 MG/1
POWDER NASAL
Qty: 2 EACH | Refills: 1 | Status: SHIPPED | OUTPATIENT
Start: 2024-04-25

## 2024-04-25 NOTE — PATIENT INSTRUCTIONS
Continue metformin 1000 mg 2 times per day     Restart Ozempic 0.25 mg weekly   We will stay at this dose for now   Will check with GI to back use they are ok with restarting     Start Levemir 30 units nightly     GUIDE FOR STARTING LONG-ACTING INSULIN    Take your blood sugar before breakfast for THREE consecutive days before increasing the dose- self titration:   If your fasting blood sugar in the morning is between 131-180 for THREE consecutive days, increase the dose by 1 unit.  If your fasting blood sugar in the morning is > 180 for three consecutive days, increase the dose by 2 units.   If your fasting blood sugar in the morning is between 80 and 130, continue your current dose.  If you have ANY blood sugar less than 80, decrease the dose by 2 units.  If you have ANY blood sugar less than 70, decrease the dose by 4 units and call your physician for assistance with further dose adjustments.      With using correction scale: Novlog only as needed     200-230: +2 units  231-280: +4 units  281-330: +6 units  331-380: +8 units  >380: +10 units

## 2024-04-25 NOTE — ASSESSMENT & PLAN NOTE
Optimize BG readings.   See above.   On gabapentin     Educated patient to check feet daily for any foreign objects and/or wounds. Discussed with patient the importance of wearing appropriate footwear at all times, not to walk barefoot ever, and to check shoes before putting them on feet. Instructed patient to keep feet dry by regularly changing shoes and socks and drying feet after baths and exercises. Also, instructed patient to report any new lesions, discolorations, or swelling to a healthcare professional.

## 2024-04-25 NOTE — PROGRESS NOTES
CC:   Chief Complaint   Patient presents with    Diabetes Mellitus       HPI: Mickey D Jeansonne is a 33 y.o. male presents for an initial visit today for the management of Diabetes     He was diagnosed with Type 2 diabetes in 2019 on routine labs when he had HA and fatigue.       Family hx of diabetes: mother and father ( )   Hospitalized for diabetes--yes  [facility] 2024  Insulin therapy--insulin was prescribed after HHS in 2024      No personal or FH of thyroid cancer or personal of pancreatic cancer or pancreatitis.       Patient was recently hospitalized on 2024 for hyperosmolar hyperglycemic state  He was sent home on insulin therapy however he never picked up the insulin since he was discharged    Patient reports he recently had an EGD and the gastroenterologist wanted to notify us that there was foods still in his belly---likely from the Ozempic-- he reports that his GI doctor doesn't think he should be taking the ozempic   He has been on the ozempic for a while -- always just taking 0.25 mg weekly   Prior to his episode of vomiting -- he had never had issues with the ozempic   He started the ozempic in      He lives with his mother   Does not have transportation     Recent A1c of 11%   Bg elevated in office     The ASCVD Risk score (Jerome ANDERSON, et al., 2019) failed to calculate for the following reasons:    The 2019 ASCVD risk score is only valid for ages 40 to 79      DIABETES COMPLICATIONS: peripheral neuropathy      Diabetes Management Status    ASA:  No    Statin: Taking--Lipitor 80 mg  ACE/ARB: Taking--benazepril 20 mg     Screening or Prevention Patient's value Goal Complete/Controlled?   HgA1C Testing and Control   Lab Results   Component Value Date    HGBA1C 11.0 (H) 2024      Annually/Less than 8% No   Lipid profile : 2024 Annually Yes   LDL control Lab Results   Component Value Date    LDLCALC 147 (H) 2024    Annually/Less than 100 mg/dl  No   Nephropathy  screening Lab Results   Component Value Date    LABMICR 15.0 08/17/2023     Lab Results   Component Value Date    PROTEINUA Trace (A) 04/11/2024    Annually Yes   Blood pressure BP Readings from Last 1 Encounters:   04/25/24 (!) 142/88    Less than 140/90 No   Dilated retinal exam : 09/01/2022 Annually Yes   Foot exam   : 04/25/2024 Annually No       CURRENT A1C:    Hemoglobin A1C   Date Value Ref Range Status   04/11/2024 11.0 (H) 4.0 - 5.6 % Final     Comment:     ADA Screening Guidelines:  5.7-6.4%  Consistent with prediabetes  >or=6.5%  Consistent with diabetes    High levels of fetal hemoglobin interfere with the HbA1C  assay. Heterozygous hemoglobin variants (HbS, HgC, etc)do  not significantly interfere with this assay.   However, presence of multiple variants may affect accuracy.     08/17/2023 8.9 (H) 4.0 - 5.6 % Final     Comment:     ADA Screening Guidelines:  5.7-6.4%  Consistent with prediabetes  >or=6.5%  Consistent with diabetes    High levels of fetal hemoglobin interfere with the HbA1C  assay. Heterozygous hemoglobin variants (HbS, HgC, etc)do  not significantly interfere with this assay.   However, presence of multiple variants may affect accuracy.     03/08/2023 8.5 (H) 4.7 - 5.6 % Final   05/18/2022 7.2 (H) 4.0 - 5.6 % Final     Comment:     ADA Screening Guidelines:  5.7-6.4%  Consistent with prediabetes  >or=6.5%  Consistent with diabetes    High levels of fetal hemoglobin interfere with the HbA1C  assay. Heterozygous hemoglobin variants (HbS, HgC, etc)do  not significantly interfere with this assay.   However, presence of multiple variants may affect accuracy.         GOAL A1C: 6.5% without hypoglycemia      DM MEDICATIONS USED IN THE PAST:  NovoLog, metformin, Ozempic  Levemir       CURRENT DIABETES MEDICATIONS:    Metformin 1000 mg b.i.d.  Not taking the Ozempic-right now--was taking 0.25 mg weekly.  It is currently on hold because he just had an EGD  He never started the insulin-- reports  that his pharmacy will not sonw it. He does not drive and he cannot drive to pick it up.   Insulin:N/A   Missed doses: Yes        BLOOD GLUCOSE MONITORING:    Not checking his BG     Results for orders placed or performed in visit on 04/25/24   POCT Glucose, Hand-Held Device   Result Value Ref Range    POC Glucose 273 (A) 70 - 110 MG/DL         HYPOGLYCEMIA:  No        MEALS: eating 2 meals per day   BF: skips   Lunch: baked meats, veggies, and fruits   Dinner: same as lunch   Snack: rarely reported   Drinks: water  Lots of juice before admission          CURRENT EXERCISE:  No        Review of Systems  Review of Systems   Constitutional:  Negative for appetite change, fatigue and unexpected weight change.   HENT:  Negative for trouble swallowing.    Eyes:  Negative for visual disturbance.   Respiratory:  Negative for shortness of breath.    Cardiovascular:  Positive for leg swelling. Negative for chest pain.   Gastrointestinal:  Positive for constipation. Negative for nausea.   Endocrine: Negative for polydipsia, polyphagia and polyuria.   Genitourinary:         No Nocturia    Skin:  Negative for wound.   Neurological:  Negative for numbness.   Psychiatric/Behavioral:  Positive for dysphoric mood.        Physical Exam   Physical Exam  Vitals and nursing note reviewed.   Constitutional:       General: He is not in acute distress.     Appearance: He is well-developed. He is obese. He is not ill-appearing.   HENT:      Head: Normocephalic and atraumatic.      Right Ear: External ear normal.      Left Ear: External ear normal.      Nose: Nose normal.   Neck:      Thyroid: No thyromegaly.      Trachea: No tracheal deviation.   Cardiovascular:      Rate and Rhythm: Normal rate and regular rhythm.      Heart sounds: No murmur heard.  Pulmonary:      Effort: Pulmonary effort is normal. No respiratory distress.      Breath sounds: Normal breath sounds.   Abdominal:      Palpations: Abdomen is soft.      Tenderness: There is  no abdominal tenderness.      Hernia: No hernia is present.   Musculoskeletal:      Cervical back: Normal range of motion and neck supple.      Right lower le+ Edema present.      Left lower le+ Edema present.   Skin:     General: Skin is warm and dry.      Capillary Refill: Capillary refill takes less than 2 seconds.      Findings: No rash.      Comments: Injection sites are normal appearing. No lipo hypertropthy or atrophy     Neurological:      Mental Status: He is alert and oriented to person, place, and time.      Cranial Nerves: No cranial nerve deficit.   Psychiatric:         Behavior: Behavior normal.         Judgment: Judgment normal.         FOOT EXAMINATION: wearing crocs    Protective Sensation (w/ 10 gram monofilament):  Right: Decreased  Left: Decreased    Visual Inspection:  Normal -  Bilateral, Nails Intact - without Evidence of Foot Deformity- Bilateral, Callus -  Bilateral, and Dry Skin -  Bilateral    Pedal Pulses:   Right: Present  Left: Present    Posterior Tibialis Pulses:   Right:Present  Left: Present        Lab Results   Component Value Date    TSH 2.78 2024           Type 2 diabetes mellitus with hyperglycemia, with long-term current use of insulin  Uncontrolled   A1c of 11%   Did not start insulin   Will check with GI for GLP1       -- Medication Changes:   Continue metformin 1000 mg 2 times per day     Restart Ozempic 0.25 mg weekly   We will stay at this dose for now   Will check with GI to back use they are ok with restarting     Start Levemir 30 units nightly     GUIDE FOR STARTING LONG-ACTING INSULIN    Take your blood sugar before breakfast for THREE consecutive days before increasing the dose- self titration:   If your fasting blood sugar in the morning is between 131-180 for THREE consecutive days, increase the dose by 1 unit.  If your fasting blood sugar in the morning is > 180 for three consecutive days, increase the dose by 2 units.   If your fasting blood sugar in  the morning is between 80 and 130, continue your current dose.  If you have ANY blood sugar less than 80, decrease the dose by 2 units.  If you have ANY blood sugar less than 70, decrease the dose by 4 units and call your physician for assistance with further dose adjustments.      With using correction scale: Novlog only as needed     200-230: +2 units  231-280: +4 units  281-330: +6 units  331-380: +8 units  >380: +10 units        -- Reviewed goals of therapy are to get the best control we can without hypoglycemia.  -- Reviewed patient's current insulin regimen. Clarified proper insulin dose and timing in relation to meals, etc. Insulin injection sites and proper rotation instructed.    -- Advised frequent self blood glucose monitoring.  Patient encouraged to document glucose results and bring them to every clinic visit. RX for dexcom G7 to [facility] to come from DME   -- Hypoglycemia precautions discussed. Instructed on precautions before driving.    -- Call for Bg repeatedly < 70 or > 180.   -- Close adherence to lifestyle changes recommended.   -- Periodic follow ups for eye evaluations, foot care and dental care suggested.  -- Refer to diabetes education-- dexcom G7 teaching and insulin review       Patient has diabetes mellitus and manages diabetes with intensive insulin regimen and uses prandial and basal insulin daily  Patient requires a therapeutic CGM and is willing to use therapeutic CGM for the necessary frequent adjustments of insulin therapy.  I have completed an in-person visit during the previous 6 months and will continue to have in-person visits every 6 months to assess adherence to their CGM regimen and diabetes treatment plan.  Due to COVID pandemic and need for remote monitoring this tool is medically necessary      Hypertension  BP goal is < 140/90.   Tolerating ACEi  Blood pressure goals discussed with patient  Blood pressure slightly above goal in clinic today  Has an appointment with PCP  next week  Reports compliance with medications    HIV (human immunodeficiency virus infection)  Increases insulin resistance  Continue to follow with Infectious Disease    Class 3 severe obesity due to excess calories with serious comorbidity and body mass index (BMI) of 60.0 to 69.9 in adult  Body mass index is 60.88 kg/m².  Increases insulin resistance.   Discussed DM diet and exercise.       GASTON (obstructive sleep apnea)  If uncontrolled increases insulin resistance  Not using CPAP machine     Dyslipidemia, goal LDL below 100  On statin per ADA recommendations  LDL goal < 100. Continue statin.   Check lipids with RTC     Type 2 diabetes mellitus with diabetic polyneuropathy, with long-term current use of insulin  Optimize BG readings.   See above.   On gabapentin     Educated patient to check feet daily for any foreign objects and/or wounds. Discussed with patient the importance of wearing appropriate footwear at all times, not to walk barefoot ever, and to check shoes before putting them on feet. Instructed patient to keep feet dry by regularly changing shoes and socks and drying feet after baths and exercises. Also, instructed patient to report any new lesions, discolorations, or swelling to a healthcare professional.          I spent a total of 60 minutes on the day of the visit.This includes face to face time and non-face to face time preparing to see the patient (eg, review of tests), obtaining and/or reviewing separately obtained history, documenting clinical information in the electronic or other health record, independently interpreting results and communicating results to the patient/family/caregiver, or care coordinator.      Follow up in about 3 months (around 7/25/2024).  Dexcom G7 sample today   Send order for dexcom G7 to [facility] please   Schedule with tracy for formal dexcom G7 teaching   Follow up with me in 3 months with labs prior       Orders Placed This Encounter   Procedures    Hemoglobin  A1C     Standing Status:   Future     Standing Expiration Date:   10/25/2025    Comprehensive Metabolic Panel     Standing Status:   Future     Standing Expiration Date:   10/25/2025    Lipid Panel     Standing Status:   Future     Standing Expiration Date:   10/25/2025    Microalbumin/Creatinine Ratio, Urine     Standing Status:   Future     Standing Expiration Date:   10/25/2025     Order Specific Question:   Specimen Source     Answer:   Urine    Ambulatory referral/consult to Diabetes Education     Standing Status:   Future     Standing Expiration Date:   10/25/2025     Referral Priority:   Routine     Referral Type:   Consultation     Referral Reason:   Specialty Services Required     Requested Specialty:   Diabetes     Number of Visits Requested:   1    POCT Glucose, Hand-Held Device       Recommendations were discussed with the patient in detail  The patient verbalized understanding and agrees with the plan outlined as above.     This note was partly generated with [employer] voice recognition software. I apologize for any possible typographical errors.   Home

## 2024-04-25 NOTE — ASSESSMENT & PLAN NOTE
Body mass index is 60.88 kg/m².  Increases insulin resistance.   Discussed DM diet and exercise.

## 2024-04-25 NOTE — TELEPHONE ENCOUNTER
----- Message from Carmen Rodriguez sent at 4/25/2024  3:29 PM CDT -----  Regarding: reurning missed call  Contact: PT  [phone]  The patient called returning missed call. Please call at your convenience     No further information provided    Patient can be contacted @# [phone]

## 2024-04-25 NOTE — TELEPHONE ENCOUNTER
----- Message from Tahir Gillespie MD sent at 4/25/2024  2:51 PM CDT -----  He had old food after 12 hours on his egd, despite holding ozempic for 1 week...    Catch 22 , gilberto he definitely needs tighter glucose control...  Whatever you think to best optimize him....     ( I never tell them they need to come off, I just warn them of possible side effects, etc of taking the drug)  ----- Message -----  From: Oralia Lees, CHAD  Sent: 4/25/2024   2:46 PM CDT  To: Tahir Gillespie MD    Hey!    This gentleman is a new patient to me today.  He told me that I needed to reach out to you to discuss the Ozempic.  From a GI standpoint do you want him to come off of the Ozempic?  Sounds like he had a 1 time episode of issues with vomiting but tells me he has been on the Ozempic for more than 6 months and this is the 1st time he has had an issue.   When this vomiting happen though he was also found to be in HHS, so that may have contributed to the vomiting since he has never had an issue before.   Told him I would reach out to you.    Thanks  Oralia

## 2024-04-25 NOTE — ASSESSMENT & PLAN NOTE
Uncontrolled   A1c of 11%   Did not start insulin   Will check with GI for GLP1       -- Medication Changes:   Continue metformin 1000 mg 2 times per day     Restart Ozempic 0.25 mg weekly   We will stay at this dose for now   Will check with GI to back use they are ok with restarting     Start Levemir 30 units nightly     GUIDE FOR STARTING LONG-ACTING INSULIN    Take your blood sugar before breakfast for THREE consecutive days before increasing the dose- self titration:   If your fasting blood sugar in the morning is between 131-180 for THREE consecutive days, increase the dose by 1 unit.  If your fasting blood sugar in the morning is > 180 for three consecutive days, increase the dose by 2 units.   If your fasting blood sugar in the morning is between 80 and 130, continue your current dose.  If you have ANY blood sugar less than 80, decrease the dose by 2 units.  If you have ANY blood sugar less than 70, decrease the dose by 4 units and call your physician for assistance with further dose adjustments.      With using correction scale: Novlog only as needed     200-230: +2 units  231-280: +4 units  281-330: +6 units  331-380: +8 units  >380: +10 units        -- Reviewed goals of therapy are to get the best control we can without hypoglycemia.  -- Reviewed patient's current insulin regimen. Clarified proper insulin dose and timing in relation to meals, etc. Insulin injection sites and proper rotation instructed.    -- Advised frequent self blood glucose monitoring.  Patient encouraged to document glucose results and bring them to every clinic visit. RX for dexcom G7 to [place] to come from DME   -- Hypoglycemia precautions discussed. Instructed on precautions before driving.    -- Call for Bg repeatedly < 70 or > 180.   -- Close adherence to lifestyle changes recommended.   -- Periodic follow ups for eye evaluations, foot care and dental care suggested.  -- Refer to diabetes education-- dexcom G7 teaching and  insulin review       Patient has diabetes mellitus and manages diabetes with intensive insulin regimen and uses prandial and basal insulin daily  Patient requires a therapeutic CGM and is willing to use therapeutic CGM for the necessary frequent adjustments of insulin therapy.  I have completed an in-person visit during the previous 6 months and will continue to have in-person visits every 6 months to assess adherence to their CGM regimen and diabetes treatment plan.  Due to COVID pandemic and need for remote monitoring this tool is medically necessary

## 2024-04-25 NOTE — PROGRESS NOTES
"DEXCOM G7 CGM TRAINING  Dexcom G7 & [facility] mobile apps downloaded to phone. Education was provided using "Quick Start Guide" and demo device per Dexcom protocol. [facility] clinic data share set-up.      Overview:  5 minute glucose reading updates, trending arrows, BG graph screens, reports screen,  connectivity and functions.   Menus: Trend graph, start sensor, enter BG, events, alerts, settings, replace sensor, stop sensor, and shutdown   Settings:                          * Urgent Low: 55 mg/dL                          * Urgent Low Soon: On                          * Low Alert: 70 mg/dL                          * High Alert: 250mg/dL                          * Rise Rate: Off                          * Fall Rate: Off                          * Signal Loss: On                          * Temporary Sensor Issue: On     Reviewed additional setting options.  Pt paired sensor with .    Reviewed where to find sensor insertion time and expiration date.   Reviewed appropriate calibration techniques.  Reviewed sensor site selection. Pt selected and prepped site right arm using aseptic technique, inserted sensor, applied overlay tape and started session.   Reviewed sensor removal from site. Discussed times to remove sensor per  guidelines include MRI or diatherapy.   Patient able to demonstrate without difficulty. Encouraged to review manual and/or training videos prior to starting another sensor.   Reviewed problem solving aspects of sensor transmission/variables that can disrupt RF transmission.  range 20 feet, but the first 3 hrs keep within 3 feet of transmitter.  Pt instructed on lag time of interstitial fluid from CBG and was advised to tx hypoglycemia and dose insulin based on SMBG values.  Dexcom technical support contact number given and examples of when to contact them discussed.       "

## 2024-04-25 NOTE — ASSESSMENT & PLAN NOTE
BP goal is < 140/90.   Tolerating ACEi  Blood pressure goals discussed with patient  Blood pressure slightly above goal in clinic today  Has an appointment with PCP next week  Reports compliance with medications

## 2024-04-25 NOTE — Clinical Note
Hey!  This gentleman is a new patient to me today.  He told me that I needed to reach out to you to discuss the Ozempic.  From a GI standpoint do you want him to come off of the Ozempic?  Sounds like he had a 1 time episode of issues with vomiting but tells me he has been on the Ozempic for more than 6 months and this is the 1st time he has had an issue.  When this vomiting happen though he was also found to be in HHS, so that may have contributed to the vomiting since he has never had an issue before.  Told him I would reach out to you.  Thanks Oralia

## 2024-04-28 ENCOUNTER — TELEPHONE (OUTPATIENT)
Dept: PRIMARY CARE CLINIC | Facility: CLINIC | Age: 34
End: 2024-04-28
Payer: MEDICARE

## 2024-04-29 ENCOUNTER — TELEPHONE (OUTPATIENT)
Dept: DIABETES | Facility: CLINIC | Age: 34
End: 2024-04-29
Payer: MEDICARE

## 2024-04-29 DIAGNOSIS — Z79.4 TYPE 2 DIABETES MELLITUS WITH HYPERGLYCEMIA, WITH LONG-TERM CURRENT USE OF INSULIN: Primary | ICD-10-CM

## 2024-04-29 DIAGNOSIS — E11.65 TYPE 2 DIABETES MELLITUS WITH HYPERGLYCEMIA, WITH LONG-TERM CURRENT USE OF INSULIN: Primary | ICD-10-CM

## 2024-04-29 RX ORDER — INSULIN PUMP SYRINGE, 3 ML
EACH MISCELLANEOUS
Qty: 1 EACH | Refills: 0 | Status: SHIPPED | OUTPATIENT
Start: 2024-04-29

## 2024-04-29 RX ORDER — LANCETS
EACH MISCELLANEOUS
Qty: 150 EACH | Refills: 11 | Status: SHIPPED | OUTPATIENT
Start: 2024-04-29

## 2024-04-29 RX ORDER — INSULIN DEGLUDEC 100 U/ML
INJECTION, SOLUTION SUBCUTANEOUS
Qty: 15 ML | Refills: 6 | Status: SHIPPED | OUTPATIENT
Start: 2024-04-29

## 2024-04-29 NOTE — TELEPHONE ENCOUNTER
----- Message from Meagan Parish LPN sent at 4/29/2024  8:14 AM CDT -----  Levemir denied, alternatives are Semglee, Toujeo and Tresiba

## 2024-04-30 ENCOUNTER — OFFICE VISIT (OUTPATIENT)
Dept: PRIMARY CARE CLINIC | Facility: CLINIC | Age: 34
End: 2024-04-30
Payer: MEDICARE

## 2024-04-30 VITALS
SYSTOLIC BLOOD PRESSURE: 124 MMHG | DIASTOLIC BLOOD PRESSURE: 72 MMHG | BODY MASS INDEX: 47.74 KG/M2 | HEART RATE: 92 BPM | OXYGEN SATURATION: 96 % | HEIGHT: 68 IN | WEIGHT: 315 LBS | RESPIRATION RATE: 18 BRPM

## 2024-04-30 DIAGNOSIS — M25.562 ACUTE PAIN OF LEFT KNEE: ICD-10-CM

## 2024-04-30 DIAGNOSIS — F41.9 ANXIETY: ICD-10-CM

## 2024-04-30 DIAGNOSIS — B20 HIV INFECTION, UNSPECIFIED SYMPTOM STATUS: ICD-10-CM

## 2024-04-30 DIAGNOSIS — G47.33 OSA (OBSTRUCTIVE SLEEP APNEA): ICD-10-CM

## 2024-04-30 DIAGNOSIS — Z23 ENCOUNTER FOR VACCINATION: ICD-10-CM

## 2024-04-30 DIAGNOSIS — S80.212D ABRASION, LEFT KNEE, SUBSEQUENT ENCOUNTER: ICD-10-CM

## 2024-04-30 DIAGNOSIS — E66.01 MORBID OBESITY WITH BMI OF 60.0-69.9, ADULT: ICD-10-CM

## 2024-04-30 DIAGNOSIS — E11.9 TYPE 2 DIABETES MELLITUS WITHOUT COMPLICATION, WITHOUT LONG-TERM CURRENT USE OF INSULIN: Primary | ICD-10-CM

## 2024-04-30 PROCEDURE — 99999PBSHW TD VACCINE GREATER THAN OR EQUAL TO 7YO PRESERVATIVE FREE IM: Mod: PBBFAC,,,

## 2024-04-30 PROCEDURE — 90714 TD VACC NO PRESV 7 YRS+ IM: CPT | Mod: PBBFAC,PN

## 2024-04-30 PROCEDURE — 99999 PR PBB SHADOW E&M-EST. PATIENT-LVL V: CPT | Mod: PBBFAC,,, | Performed by: INTERNAL MEDICINE

## 2024-04-30 PROCEDURE — 99999PBSHW PNEUMOCOCCAL CONJUGATE VACCINE 20-VALENT: Mod: PBBFAC,,,

## 2024-04-30 PROCEDURE — 99214 OFFICE O/P EST MOD 30 MIN: CPT | Mod: S$PBB,ICN,, | Performed by: INTERNAL MEDICINE

## 2024-04-30 PROCEDURE — 99215 OFFICE O/P EST HI 40 MIN: CPT | Mod: PBBFAC,PN | Performed by: INTERNAL MEDICINE

## 2024-04-30 PROCEDURE — 90677 PCV20 VACCINE IM: CPT | Mod: PBBFAC,PN

## 2024-04-30 RX ORDER — SEMAGLUTIDE 1.34 MG/ML
1 INJECTION, SOLUTION SUBCUTANEOUS
Qty: 3 ML | Refills: 11 | Status: SHIPPED | OUTPATIENT
Start: 2024-04-30 | End: 2024-05-10 | Stop reason: SDUPTHER

## 2024-04-30 NOTE — PROGRESS NOTES
Subjective:       Patient ID: Mickey D Jeansonne is a 33 y.o. male.    Chief Complaint: Follow-up (hospital) and Leg Pain (left)    HPI  patient visit today with history of diabetes mellitus gouty arthritis HIV infection hypertension morbid obesity patient visit today for follow-up from hospitalization 3 weeks ago when he had intractable nausea vomiting for 5 out at home sometime with hematemesis and severe hyperglycemia hypotension [facility] patient brought to the ER admitted to ICU for IV fluid patient also slept and fell in the ICU when he try to urinate in the urinal with spill of urine on the floor patient slipped and fell straight down on his buttock from sitting position on the side of the bed and his legs were splayed patient complained of lower back pain and left knee pain since then the back pain resolved but left knee still hurt he did not get any treatment x-ray since he fell okay at that time but now left knee still hurt patient also has elevated hemoglobin A1c has been on very low-dose Ozempic 0.25 mg a week and not compliant with diabetic diet patient has been seen by and urine and currently on Levemir and regular insulin t.i.d. with sliding scale and he has Descom to monitor glucose his glucose level has been better  Review of Systems   Constitutional:  Negative for unexpected weight change.   Respiratory:  Negative for shortness of breath.    Cardiovascular:  Negative for chest pain and palpitations.   Gastrointestinal:  Negative for abdominal pain.   Musculoskeletal:  Positive for arthralgias.   Psychiatric/Behavioral:  Negative for dysphoric mood.        Objective:      Physical Exam  Vitals and nursing note reviewed.   Constitutional:       General: He is not in acute distress.     Appearance: He is well-developed. He is obese.   HENT:      Head: Normocephalic and atraumatic.      Right Ear: External ear normal.      Left Ear: External ear normal.      Nose: Nose normal.   Eyes:       Conjunctiva/sclera: Conjunctivae normal.      Pupils: Pupils are equal, round, and reactive to light.   Neck:      Thyroid: No thyromegaly.   Cardiovascular:      Rate and Rhythm: Normal rate and regular rhythm.      Heart sounds: Normal heart sounds. No murmur heard.     No friction rub. No gallop.   Pulmonary:      Effort: Pulmonary effort is normal. No respiratory distress.      Breath sounds: Normal breath sounds. No wheezing.   Abdominal:      General: Bowel sounds are normal. There is no distension.      Palpations: Abdomen is soft.      Tenderness: There is no abdominal tenderness.   Musculoskeletal:         General: Tenderness (Tenderness around left kneecap without any erythema effusion or warmth to touch) present. No deformity. Normal range of motion.      Cervical back: Normal range of motion and neck supple.   Lymphadenopathy:      Cervical: No cervical adenopathy.   Skin:     General: Skin is warm and dry.      Findings: No erythema or rash.   Neurological:      Mental Status: He is alert and oriented to person, place, and time.   Psychiatric:         Mood and Affect: Mood normal.         Thought Content: Thought content normal.         Judgment: Judgment normal.         Assessment:       1. Type 2 diabetes mellitus without complication, without long-term current use of insulin    2. Encounter for vaccination    3. Abrasion, left knee, subsequent encounter    4. GASTON (obstructive sleep apnea)    5. Morbid obesity with BMI of 60.0-69.9, adult    6. Acute pain of left knee    7. HIV infection, unspecified symptom status    8. Anxiety        Plan:       Type 2 diabetes mellitus without complication, without long-term current use of insulin  -     semaglutide (OZEMPIC) 1 mg/dose (4 mg/3 mL); Inject 1 mg into the skin every 7 days.  Dispense: 3 mL; Refill: 11  -     Hemoglobin A1C; Future; Expected date: 05/30/2024  -     Comprehensive Metabolic Panel; Future; Expected date: 05/30/2024  -     Urinalysis;  Future; Expected date: 05/30/2024    Encounter for vaccination  -     (In Office Administered) Td Vaccine - Preservative Free  -     (In Office Administered) Pneumococcal Conjugate Vaccine (20 Valent) (IM) (Preferred)    Abrasion, left knee, subsequent encounter  -     (In Office Administered) Td Vaccine - Preservative Free  -     X-Ray Knee 3 View Left; Future; Expected date: 04/30/2024    GASTON (obstructive sleep apnea)  Comments:  Continue with CPAP machine and need to lose weight    Morbid obesity with BMI of 60.0-69.9, adult  Comments:  Discussed with patient need to lose weight with diet exercise and will increase Ozempic  Orders:  -     TSH; Future; Expected date: 05/30/2024  -     T4, Free; Future; Expected date: 05/30/2024    Acute pain of left knee    HIV infection, unspecified symptom status  -     TSH; Future; Expected date: 05/30/2024  -     T4, Free; Future; Expected date: 05/30/2024    Anxiety  -     TSH; Future; Expected date: 05/30/2024  -     T4, Free; Future; Expected date: 05/30/2024        Medication List with Changes/Refills   New Medications    SEMAGLUTIDE (OZEMPIC) 1 MG/DOSE (4 MG/3 ML)    Inject 1 mg into the skin every 7 days.   Current Medications    ALLOPURINOL (ZYLOPRIM) 300 MG TABLET    Take 1 tablet (300 mg total) by mouth once daily.    AMLODIPINE-BENAZEPRIL 5-20 MG (LOTREL) 5-20 MG PER CAPSULE    Take 1 capsule by mouth 2 (two) times a day.    ATORVASTATIN (LIPITOR) 80 MG TABLET    Take 1 tablet (80 mg total) by mouth once daily.    BIKTARVY -25 MG PER TABLET    once daily.    BLOOD PRESSURE MONITOR (Vostu) XL ARM SIZE (OP)    by Other route as needed for High Blood Pressure.    BLOOD SUGAR DIAGNOSTIC STRP    To check BG 4 times daily, to use with insurance preferred meter    BLOOD-GLUCOSE METER KIT    To check BG 4 times daily, to use with insurance preferred meter    CHLORHEXIDINE (HIBICLENS) 4 % EXTERNAL LIQUID    Apply topically daily as needed (ski infection).     CLOBETASOL (TEMOVATE) 0.05 % EXTERNAL SOLUTION    SMARTSIG:Topical Morning-Evening    CLOTRIMAZOLE-BETAMETHASONE 1-0.05% (LOTRISONE) CREAM    Apply topically 2 (two) times daily.    COLCHICINE (COLCRYS) 0.6 MG TABLET    TAKE TWO TABLETS BY MOUTH AT first sign OF flare up followed by ONE TABLET IN ONE hour then on day2 TAKE ONE TABLET BY MOUTH TWICE DAILY until flare stops.    ERGOCALCIFEROL (ERGOCALCIFEROL) 50,000 UNIT CAP    Take 1 capsule (50,000 Units total) by mouth every 7 days.    ESCITALOPRAM OXALATE (LEXAPRO) 20 MG TABLET    Take 20 mg by mouth once daily.    FLUOXETINE 20 MG CAPSULE    Take 20 mg by mouth once daily.    FUROSEMIDE (LASIX) 40 MG TABLET    Take 1 tablet (40 mg total) by mouth 2 (two) times daily.    GABAPENTIN (NEURONTIN) 300 MG CAPSULE    Take 300 mg by mouth 3 (three) times daily.    GLUCAGON (BAQSIMI) 3 MG/ACTUATION SPRY    Administer one spray (3 mg) into a single nostril as needed; If no response, may repeat in 15 minutes in opposite nostril using a new intranasal device.    INDOMETHACIN (INDOCIN SR) 75 MG CPSR CR CAPSULE    Take 1 capsule (75 mg total) by mouth 2 (two) times daily as needed (gout attack). 1 po BID for gout    INSULIN ASPART U-100 (NOVOLOG) 100 UNIT/ML (3 ML) INPN PEN    Administer per sliding scale 3 times daily before meals as needed. MAX total daily dose: 50 units.    INSULIN DEGLUDEC (TRESIBA FLEXTOUCH U-100) 100 UNIT/ML (3 ML) INSULIN PEN    Inject 30 units into the skin once daily. Titrate up to Fasting Blood Glucose less than 130. MAX total daily dose: 50 units.    INSULIN DETEMIR U-100, LEVEMIR, 100 UNIT/ML (3 ML) SUBQ INPN PEN    Inject 30 units into the skin once daily. Titrate up to Fasting Blood Glucose less than 130. MAX total daily dose: 50 units.    KETOCONAZOLE (NIZORAL) 2 % SHAMPOO    Apply Once weekly    LANCETS MISC    To check BG 4 times daily, to use with insurance preferred meter    METFORMIN (GLUCOPHAGE) 1000 MG TABLET    Take 1 tablet (1,000 mg  "total) by mouth 2 (two) times daily with meals.    METOPROLOL SUCCINATE (TOPROL-XL) 25 MG 24 HR TABLET    Take 1 tablet (25 mg total) by mouth once daily.    MUPIROCIN (BACTROBAN) 2 % OINTMENT    Apply topically 2 (two) times daily.    PANTOPRAZOLE (PROTONIX) 40 MG TABLET    Take 1 tablet (40 mg total) by mouth once daily.    PEN NEEDLE, DIABETIC (BD ULTRA-FINE MINI PEN NEEDLE) 31 GAUGE X 3/16" NDLE    Use with insulin pens 4 (four) times daily.    TRAZODONE (DESYREL) 100 MG TABLET    Take 100-200 mg by mouth nightly as needed.    TRIAMCINOLONE (KENALOG) 0.5 % OINTMENT    Apply topically 2 (two) times daily.   Discontinued Medications    SEMAGLUTIDE (OZEMPIC) 0.25 MG OR 0.5 MG (2 MG/3 ML) PEN INJECTOR    Inject 0.25 mg into the skin every 7 days.        "

## 2024-05-03 ENCOUNTER — PATIENT MESSAGE (OUTPATIENT)
Dept: DIABETES | Facility: CLINIC | Age: 34
End: 2024-05-03
Payer: MEDICARE

## 2024-05-03 ENCOUNTER — TELEPHONE (OUTPATIENT)
Dept: DIABETES | Facility: CLINIC | Age: 34
End: 2024-05-03
Payer: MEDICARE

## 2024-05-03 NOTE — TELEPHONE ENCOUNTER
Pt called back stating that [employer] did not have his medicaid information , provided pt with his id, asking him to call company back to provide with updated insurance info, pt V/U

## 2024-05-03 NOTE — TELEPHONE ENCOUNTER
Doesn't he have Medicare and Medicaid??  If he has both it sounds like they are not running the Medicaid portion  The Medicaid should  whatever the Medicare does not pay for and then the Dexcom should be free  Where is the Dexcom coming from the pharmacy or the DME company??  We may need to reach out to make sure that they have his Medicaid information as well

## 2024-05-03 NOTE — TELEPHONE ENCOUNTER
----- Message from Meagan Parish LPN sent at 5/3/2024  1:54 PM CDT -----  Hey pt stated he has to cover 20% of the dexcom which is about $200 a month stated he can't afford this

## 2024-05-03 NOTE — TELEPHONE ENCOUNTER
Spoke to pt informed pt that he needs to reach out to [employer] just to confirm that they have his medicaid information  to cover the dexcom, pt V/U

## 2024-05-07 ENCOUNTER — PATIENT MESSAGE (OUTPATIENT)
Dept: DIABETES | Facility: CLINIC | Age: 34
End: 2024-05-07
Payer: MEDICARE

## 2024-05-07 ENCOUNTER — TELEPHONE (OUTPATIENT)
Dept: DIABETES | Facility: CLINIC | Age: 34
End: 2024-05-07
Payer: MEDICARE

## 2024-05-07 NOTE — TELEPHONE ENCOUNTER
Spoke to pt stated that [employer] processed dexcom order and dexcom was 250 dollars, pt stated he cannot cover the dexcom as his medicaid coverage is inactive

## 2024-05-07 NOTE — TELEPHONE ENCOUNTER
----- Message from Meagan Parish LPN sent at 5/7/2024  2:49 PM CDT -----  Pt stated that the dexcom is 250 dollars , he is saying he can't afford, the company tried processing his medicaid, but his coverage is inactive so he just called back to let us know this, he can't afford the dexcom

## 2024-05-10 ENCOUNTER — PATIENT MESSAGE (OUTPATIENT)
Dept: PRIMARY CARE CLINIC | Facility: CLINIC | Age: 34
End: 2024-05-10
Payer: MEDICARE

## 2024-05-10 DIAGNOSIS — E11.9 TYPE 2 DIABETES MELLITUS WITHOUT COMPLICATION, WITHOUT LONG-TERM CURRENT USE OF INSULIN: ICD-10-CM

## 2024-05-11 RX ORDER — SEMAGLUTIDE 1.34 MG/ML
1 INJECTION, SOLUTION SUBCUTANEOUS
Qty: 3 ML | Refills: 11 | Status: SHIPPED | OUTPATIENT
Start: 2024-05-11 | End: 2025-05-11

## 2024-05-16 DIAGNOSIS — I10 PRIMARY HYPERTENSION: ICD-10-CM

## 2024-05-16 DIAGNOSIS — M25.473 ANKLE EDEMA: ICD-10-CM

## 2024-05-16 NOTE — TELEPHONE ENCOUNTER
No care due was identified.  [facility] Embedded Care Due Messages. Reference number: [id number].   5/16/2024 3:12:42 PM CDT

## 2024-05-17 NOTE — TELEPHONE ENCOUNTER
Refill Routing Note   Medication(s) are not appropriate for processing by [facility] for the following reason(s):        Required labs abnormal: Cr    [facility] action(s):  Defer               Appointments  past 12m or future 3m with PCP    Date Provider   Last Visit   4/30/2024 Santiago Chaparro MD   Next Visit   6/13/2024 Santiago Chaparro MD   ED visits in past 90 days: 0        Note composed:2:06 PM 05/17/2024

## 2024-05-18 RX ORDER — FUROSEMIDE 40 MG/1
40 TABLET ORAL 2 TIMES DAILY
Qty: 180 TABLET | Refills: 3 | Status: SHIPPED | OUTPATIENT
Start: 2024-05-18

## 2024-05-18 NOTE — TELEPHONE ENCOUNTER
Refill Decision Note   Mickey Jeansonne  is requesting a refill authorization.  Brief Assessment and Rationale for Refill:  Approve     Medication Therapy Plan:         Comments:     Note composed:8:33 AM 05/18/2024

## 2024-05-23 ENCOUNTER — PATIENT MESSAGE (OUTPATIENT)
Dept: ADMINISTRATIVE | Facility: HOSPITAL | Age: 34
End: 2024-05-23
Payer: MEDICARE

## 2024-05-29 ENCOUNTER — PATIENT MESSAGE (OUTPATIENT)
Dept: PRIMARY CARE CLINIC | Facility: CLINIC | Age: 34
End: 2024-05-29
Payer: MEDICARE

## 2024-05-30 ENCOUNTER — PATIENT OUTREACH (OUTPATIENT)
Dept: ADMINISTRATIVE | Facility: HOSPITAL | Age: 34
End: 2024-05-30
Payer: MEDICARE

## 2024-05-30 NOTE — PROGRESS NOTES
Health Maintenance Due   Topic Date Due    Eye Exam  09/01/2023        Chart review done.   HM updated.   Immunizations reviewed & updated.   Care Everywhere updated.

## 2024-06-10 ENCOUNTER — PATIENT MESSAGE (OUTPATIENT)
Dept: PRIMARY CARE CLINIC | Facility: CLINIC | Age: 34
End: 2024-06-10
Payer: MEDICARE

## 2024-06-12 LAB
LEFT EYE DM RETINOPATHY: NEGATIVE
RIGHT EYE DM RETINOPATHY: NEGATIVE

## 2024-06-13 ENCOUNTER — OFFICE VISIT (OUTPATIENT)
Dept: PRIMARY CARE CLINIC | Facility: CLINIC | Age: 34
End: 2024-06-13
Payer: MEDICARE

## 2024-06-13 ENCOUNTER — CLINICAL SUPPORT (OUTPATIENT)
Dept: PRIMARY CARE CLINIC | Facility: CLINIC | Age: 34
End: 2024-06-13
Attending: INTERNAL MEDICINE
Payer: MEDICARE

## 2024-06-13 ENCOUNTER — PATIENT OUTREACH (OUTPATIENT)
Dept: ADMINISTRATIVE | Facility: HOSPITAL | Age: 34
End: 2024-06-13
Payer: MEDICARE

## 2024-06-13 VITALS
WEIGHT: 315 LBS | OXYGEN SATURATION: 97 % | SYSTOLIC BLOOD PRESSURE: 128 MMHG | HEART RATE: 82 BPM | DIASTOLIC BLOOD PRESSURE: 78 MMHG | BODY MASS INDEX: 47.74 KG/M2 | HEIGHT: 68 IN | RESPIRATION RATE: 18 BRPM

## 2024-06-13 DIAGNOSIS — E11.9 TYPE 2 DIABETES MELLITUS WITHOUT COMPLICATION, UNSPECIFIED WHETHER LONG TERM INSULIN USE: ICD-10-CM

## 2024-06-13 DIAGNOSIS — I10 ESSENTIAL HYPERTENSION: ICD-10-CM

## 2024-06-13 DIAGNOSIS — G47.33 OSA (OBSTRUCTIVE SLEEP APNEA): ICD-10-CM

## 2024-06-13 DIAGNOSIS — E66.01 CLASS 3 SEVERE OBESITY DUE TO EXCESS CALORIES WITH SERIOUS COMORBIDITY AND BODY MASS INDEX (BMI) OF 60.0 TO 69.9 IN ADULT: ICD-10-CM

## 2024-06-13 DIAGNOSIS — E11.9 TYPE 2 DIABETES MELLITUS WITHOUT COMPLICATION, WITHOUT LONG-TERM CURRENT USE OF INSULIN: Primary | ICD-10-CM

## 2024-06-13 DIAGNOSIS — B20 HIV INFECTION, UNSPECIFIED SYMPTOM STATUS: ICD-10-CM

## 2024-06-13 PROCEDURE — 92228 IMG RTA DETC/MNTR DS PHY/QHP: CPT | Mod: 26,S$PBB,, | Performed by: OPTOMETRIST

## 2024-06-13 PROCEDURE — 99214 OFFICE O/P EST MOD 30 MIN: CPT | Mod: S$PBB,,, | Performed by: INTERNAL MEDICINE

## 2024-06-13 PROCEDURE — 99999 PR PBB SHADOW E&M-EST. PATIENT-LVL V: CPT | Mod: PBBFAC,,, | Performed by: INTERNAL MEDICINE

## 2024-06-13 PROCEDURE — 92228 IMG RTA DETC/MNTR DS PHY/QHP: CPT | Mod: PBBFAC,PN

## 2024-06-13 PROCEDURE — 99215 OFFICE O/P EST HI 40 MIN: CPT | Mod: PBBFAC,PN,25 | Performed by: INTERNAL MEDICINE

## 2024-06-13 RX ORDER — AMLODIPINE AND BENAZEPRIL HYDROCHLORIDE 5; 20 MG/1; MG/1
1 CAPSULE ORAL 2 TIMES DAILY
Qty: 180 CAPSULE | Refills: 1 | Status: SHIPPED | OUTPATIENT
Start: 2024-06-13

## 2024-06-13 NOTE — PROGRESS NOTES
There are no preventive care reminders to display for this patient.    Immunizations - reviewed and updated   Care Everywhere - triggered   Care Teams - updated   Outreach - Chart review done. Patient has an appointment with PCP today, 6/13/2024. Diabetic eye photo screening appointment scheduled, poor quality. Patient states that he went to [facility] yesterday. RANULFO sent to Dr. Del Toro for records

## 2024-06-13 NOTE — PROGRESS NOTES
Mickey D Jeansonne is a 33 y.o. male here for a diabetic eye screening with non-dilated fundus photos per Dr. Santiago Chaparro.    Patient cooperative?: Yes  Small pupils?: No  Last eye exam: >1 year    For exam results, see Encounter Report.

## 2024-06-13 NOTE — LETTER
AUTHORIZATION FOR RELEASE OF   CONFIDENTIAL INFORMATION    Dear Dr. Del Toro,    We are seeing Mickey D Jeansonne, date of birth 1990, in the clinic at [facility]. Santiago Chaparro MD is the patient's PCP. Mickey D Jeansonne has an outstanding lab/procedure at the time we reviewed his chart. In order to help keep his health information updated, he has authorized us to request the following medical record(s):        (  )  MAMMOGRAM                                      (  )  COLONOSCOPY      (  )  PAP SMEAR                                          (  )  OUTSIDE LAB RESULTS     (  )  DEXA SCAN                                          (X)  EYE EXAM            (  )  FOOT EXAM                                          (  )  ENTIRE RECORD     (  )  OUTSIDE IMMUNIZATIONS                 (  )  _______________       ATTN: NEELA      Please fax records to Santiago Chaparro MD, [phone]     If you have any questions, please contact Neela at [phone].           Patient Name: Mickey D Jeansonne  : 1990  Patient Phone #: [phone]

## 2024-06-14 NOTE — PROGRESS NOTES
Subjective:       Patient ID: Mickey D Jeansonne is a 33 y.o. male.    Chief Complaint: Follow-up    Follow-up  Associated symptoms include arthralgias and headaches. Pertinent negatives include no chest pain.     patient visit today for routine follow-up he has history of morbid obesity hypertension hyperlipidemia HIV infection severe obesity type 2 diabetes mellitus on insulin GI bleed with hematemesis gouty arthritis obstructive sleep apnea patient visit today complained that he is still having headache mostly in the frontal area that come and go associated with photophobia nausea no vomiting he denies short of breath chest pain or any vision problem he has sleep study diagnosed with obstructive sleep apnea awaiting for CPAP machine that is supposed to be taking care by sleep lab but so far no information yet he denies short of breath chest pain dyspnea with exertion he continue to gain weight not able to stay on diet to lose weight does not want gastric sleeve  Review of Systems   Constitutional:  Negative for unexpected weight change.   Respiratory:  Negative for shortness of breath.    Cardiovascular:  Negative for chest pain.   Gastrointestinal:  Negative for abdominal distention.   Musculoskeletal:  Positive for arthralgias and back pain.   Neurological:  Positive for headaches.   Psychiatric/Behavioral:  Positive for dysphoric mood.        Objective:      Physical Exam  Vitals and nursing note reviewed.   Constitutional:       General: He is not in acute distress.     Appearance: He is well-developed. He is obese.   HENT:      Head: Normocephalic and atraumatic.      Right Ear: External ear normal.      Left Ear: External ear normal.      Nose: Nose normal.   Eyes:      Extraocular Movements: Extraocular movements intact.      Conjunctiva/sclera: Conjunctivae normal.      Pupils: Pupils are equal, round, and reactive to light.   Neck:      Thyroid: No thyromegaly.   Cardiovascular:      Rate and Rhythm:  Normal rate and regular rhythm.      Heart sounds: Normal heart sounds. No murmur heard.     No friction rub. No gallop.   Pulmonary:      Effort: Pulmonary effort is normal. No respiratory distress.      Breath sounds: Normal breath sounds. No wheezing.   Abdominal:      General: Bowel sounds are normal. There is no distension.      Palpations: Abdomen is soft.      Tenderness: There is no abdominal tenderness.   Musculoskeletal:         General: No tenderness or deformity. Normal range of motion.      Cervical back: Normal range of motion and neck supple.   Lymphadenopathy:      Cervical: No cervical adenopathy.   Skin:     General: Skin is warm and dry.      Findings: No erythema or rash.   Neurological:      Mental Status: He is alert and oriented to person, place, and time.   Psychiatric:         Mood and Affect: Mood normal.         Thought Content: Thought content normal.         Judgment: Judgment normal.         Assessment:       1. Type 2 diabetes mellitus without complication, without long-term current use of insulin    2. Essential hypertension    3. HIV infection, unspecified symptom status    4. GASTON (obstructive sleep apnea)    5. Class 3 severe obesity due to excess calories with serious comorbidity and body mass index (BMI) of 60.0 to 69.9 in adult        Plan:       Type 2 diabetes mellitus without complication, without long-term current use of insulin  Comments:  Hemoglobin A1c better but still high patient will try to control it better with diet and increase in exercise activity continue with insulin    Essential hypertension  -     amlodipine-benazepril 5-20 mg (LOTREL) 5-20 mg per capsule; Take 1 capsule by mouth 2 (two) times a day.  Dispense: 180 capsule; Refill: 1    HIV infection, unspecified symptom status  Comments:  Continue with treatment infectious disease patient doing well without any of opportunistic infection    GASTON (obstructive sleep apnea)  Comments:  Patient await for CPAP  machine    Class 3 severe obesity due to excess calories with serious comorbidity and body mass index (BMI) of 60.0 to 69.9 in adult  Comments:  Discussed with patient need to lose weight with diet and exercise and possible gastric sleeve        Medication List with Changes/Refills   Current Medications    ALLOPURINOL (ZYLOPRIM) 300 MG TABLET    Take 1 tablet (300 mg total) by mouth once daily.    ATORVASTATIN (LIPITOR) 80 MG TABLET    Take 1 tablet (80 mg total) by mouth once daily.    BIKTARVY -25 MG PER TABLET    once daily.    BLOOD PRESSURE MONITOR (IHEALTH EASE) XL ARM SIZE (OP)    by Other route as needed for High Blood Pressure.    BLOOD SUGAR DIAGNOSTIC STRP    To check BG 4 times daily, to use with insurance preferred meter    BLOOD-GLUCOSE METER KIT    To check BG 4 times daily, to use with insurance preferred meter    CHLORHEXIDINE (HIBICLENS) 4 % EXTERNAL LIQUID    Apply topically daily as needed (ski infection).    CLOBETASOL (TEMOVATE) 0.05 % EXTERNAL SOLUTION    SMARTSIG:Topical Morning-Evening    CLOTRIMAZOLE-BETAMETHASONE 1-0.05% (LOTRISONE) CREAM    Apply topically 2 (two) times daily.    COLCHICINE (COLCRYS) 0.6 MG TABLET    TAKE TWO TABLETS BY MOUTH AT first sign OF flare up followed by ONE TABLET IN ONE hour then on day2 TAKE ONE TABLET BY MOUTH TWICE DAILY until flare stops.    ERGOCALCIFEROL (ERGOCALCIFEROL) 50,000 UNIT CAP    Take 1 capsule (50,000 Units total) by mouth every 7 days.    ESCITALOPRAM OXALATE (LEXAPRO) 20 MG TABLET    Take 20 mg by mouth once daily.    FLUOXETINE 20 MG CAPSULE    Take 20 mg by mouth once daily.    FUROSEMIDE (LASIX) 40 MG TABLET    Take 1 tablet (40 mg total) by mouth 2 (two) times daily.    GABAPENTIN (NEURONTIN) 300 MG CAPSULE    Take 300 mg by mouth 3 (three) times daily.    GLUCAGON (BAQSIMI) 3 MG/ACTUATION SPRY    Administer one spray (3 mg) into a single nostril as needed; If no response, may repeat in 15 minutes in opposite nostril using a new  "intranasal device.    INDOMETHACIN (INDOCIN SR) 75 MG CPSR CR CAPSULE    Take 1 capsule (75 mg total) by mouth 2 (two) times daily as needed (gout attack). 1 po BID for gout    INSULIN ASPART U-100 (NOVOLOG) 100 UNIT/ML (3 ML) INPN PEN    Administer per sliding scale 3 times daily before meals as needed. MAX total daily dose: 50 units.    INSULIN DEGLUDEC (TRESIBA FLEXTOUCH U-100) 100 UNIT/ML (3 ML) INSULIN PEN    Inject 30 units into the skin once daily. Titrate up to Fasting Blood Glucose less than 130. MAX total daily dose: 50 units.    INSULIN DETEMIR U-100, LEVEMIR, 100 UNIT/ML (3 ML) SUBQ INPN PEN    Inject 30 units into the skin once daily. Titrate up to Fasting Blood Glucose less than 130. MAX total daily dose: 50 units.    KETOCONAZOLE (NIZORAL) 2 % SHAMPOO    Apply Once weekly    LANCETS MISC    To check BG 4 times daily, to use with insurance preferred meter    METFORMIN (GLUCOPHAGE) 1000 MG TABLET    Take 1 tablet (1,000 mg total) by mouth 2 (two) times daily with meals.    METOPROLOL SUCCINATE (TOPROL-XL) 25 MG 24 HR TABLET    Take 1 tablet (25 mg total) by mouth once daily.    MUPIROCIN (BACTROBAN) 2 % OINTMENT    Apply topically 2 (two) times daily.    PANTOPRAZOLE (PROTONIX) 40 MG TABLET    Take 1 tablet (40 mg total) by mouth once daily.    PEN NEEDLE, DIABETIC (BD ULTRA-FINE MINI PEN NEEDLE) 31 GAUGE X 3/16" NDLE    Use with insulin pens 4 (four) times daily.    SEMAGLUTIDE (OZEMPIC) 1 MG/DOSE (4 MG/3 ML)    Inject 1 mg into the skin every 7 days.    TRAZODONE (DESYREL) 100 MG TABLET    Take 100-200 mg by mouth nightly as needed.    TRIAMCINOLONE (KENALOG) 0.5 % OINTMENT    Apply topically 2 (two) times daily.   Changed and/or Refilled Medications    Modified Medication Previous Medication    AMLODIPINE-BENAZEPRIL 5-20 MG (LOTREL) 5-20 MG PER CAPSULE amlodipine-benazepril 5-20 mg (LOTREL) 5-20 mg per capsule       Take 1 capsule by mouth 2 (two) times a day.    Take 1 capsule by mouth 2 (two) times " a day.

## 2024-06-17 ENCOUNTER — PATIENT OUTREACH (OUTPATIENT)
Dept: ADMINISTRATIVE | Facility: HOSPITAL | Age: 34
End: 2024-06-17
Payer: MEDICARE

## 2024-06-17 NOTE — PROGRESS NOTES
There are no preventive care reminders to display for this patient.    Immunizations - reviewed and updated   Care Everywhere - triggered   Care Teams -   Outreach - Diabetic eye exam done 6/12/2024, uploaded to .  updated

## 2024-06-20 ENCOUNTER — TELEPHONE (OUTPATIENT)
Dept: PRIMARY CARE CLINIC | Facility: CLINIC | Age: 34
End: 2024-06-20
Payer: MEDICARE

## 2024-06-20 DIAGNOSIS — J06.9 URI WITH COUGH AND CONGESTION: Primary | ICD-10-CM

## 2024-06-20 RX ORDER — PROMETHAZINE HYDROCHLORIDE AND DEXTROMETHORPHAN HYDROBROMIDE 6.25; 15 MG/5ML; MG/5ML
5 SYRUP ORAL EVERY 4 HOURS PRN
Qty: 120 ML | Refills: 0 | Status: SHIPPED | OUTPATIENT
Start: 2024-06-20 | End: 2024-06-30

## 2024-06-20 RX ORDER — AZITHROMYCIN 250 MG/1
TABLET, FILM COATED ORAL
Qty: 6 TABLET | Refills: 0 | Status: SHIPPED | OUTPATIENT
Start: 2024-06-20 | End: 2024-06-24

## 2024-06-24 ENCOUNTER — TELEPHONE (OUTPATIENT)
Dept: DIABETES | Facility: CLINIC | Age: 34
End: 2024-06-24
Payer: MEDICARE

## 2024-07-15 PROBLEM — K92.2 GI BLEED: Status: RESOLVED | Noted: 2024-04-11 | Resolved: 2024-07-15

## 2024-07-15 PROBLEM — K92.0 HEMATEMESIS WITH NAUSEA: Status: RESOLVED | Noted: 2024-04-11 | Resolved: 2024-07-15

## 2024-07-23 ENCOUNTER — TELEPHONE (OUTPATIENT)
Dept: DIABETES | Facility: CLINIC | Age: 34
End: 2024-07-23
Payer: MEDICARE

## 2024-08-02 ENCOUNTER — TELEPHONE (OUTPATIENT)
Dept: DIABETES | Facility: CLINIC | Age: 34
End: 2024-08-02
Payer: MEDICARE

## 2024-08-02 ENCOUNTER — PATIENT MESSAGE (OUTPATIENT)
Dept: DIABETES | Facility: CLINIC | Age: 34
End: 2024-08-02
Payer: MEDICARE

## 2024-08-02 DIAGNOSIS — I10 ESSENTIAL HYPERTENSION: ICD-10-CM

## 2024-08-05 RX ORDER — AMLODIPINE AND BENAZEPRIL HYDROCHLORIDE 5; 20 MG/1; MG/1
1 CAPSULE ORAL 2 TIMES DAILY
Qty: 180 CAPSULE | Refills: 1 | Status: SHIPPED | OUTPATIENT
Start: 2024-08-05

## 2024-08-09 ENCOUNTER — PATIENT MESSAGE (OUTPATIENT)
Dept: PRIMARY CARE CLINIC | Facility: CLINIC | Age: 34
End: 2024-08-09
Payer: MEDICARE

## 2024-08-09 DIAGNOSIS — R51.9 NONINTRACTABLE HEADACHE, UNSPECIFIED CHRONICITY PATTERN, UNSPECIFIED HEADACHE TYPE: Primary | ICD-10-CM

## 2024-08-14 ENCOUNTER — PATIENT MESSAGE (OUTPATIENT)
Dept: DIABETES | Facility: CLINIC | Age: 34
End: 2024-08-14
Payer: MEDICARE

## 2024-08-15 ENCOUNTER — NUTRITION (OUTPATIENT)
Dept: DIABETES | Facility: CLINIC | Age: 34
End: 2024-08-15
Payer: MEDICARE

## 2024-08-15 DIAGNOSIS — R45.2 DEPRESSED MOOD WITH FEELING OF LONELINESS: Primary | ICD-10-CM

## 2024-08-15 DIAGNOSIS — Z79.4 TYPE 2 DIABETES MELLITUS WITH HYPERGLYCEMIA, WITH LONG-TERM CURRENT USE OF INSULIN: ICD-10-CM

## 2024-08-15 DIAGNOSIS — E11.65 TYPE 2 DIABETES MELLITUS WITH HYPERGLYCEMIA, WITH LONG-TERM CURRENT USE OF INSULIN: ICD-10-CM

## 2024-08-15 DIAGNOSIS — R45.89 DEPRESSED MOOD WITH FEELING OF LONELINESS: Primary | ICD-10-CM

## 2024-08-15 PROCEDURE — 99212 OFFICE O/P EST SF 10 MIN: CPT | Mod: PBBFAC,PN | Performed by: DIETITIAN, REGISTERED

## 2024-08-15 PROCEDURE — 99999 PR PBB SHADOW E&M-EST. PATIENT-LVL II: CPT | Mod: PBBFAC,,, | Performed by: DIETITIAN, REGISTERED

## 2024-08-15 PROCEDURE — 99999PBSHW PR PBB SHADOW TECHNICAL ONLY FILED TO HB: Mod: PBBFAC,,,

## 2024-08-15 PROCEDURE — G0108 DIAB MANAGE TRN  PER INDIV: HCPCS | Mod: PBBFAC,PN | Performed by: DIETITIAN, REGISTERED

## 2024-08-15 RX ORDER — BUTALBITAL, ACETAMINOPHEN AND CAFFEINE 50; 325; 40 MG/1; MG/1; MG/1
1 TABLET ORAL 3 TIMES DAILY PRN
Qty: 30 TABLET | Refills: 0 | Status: SHIPPED | OUTPATIENT
Start: 2024-08-15 | End: 2024-09-14

## 2024-08-15 RX ORDER — INSULIN PUMP SYRINGE, 3 ML
EACH MISCELLANEOUS
Qty: 1 EACH | Refills: 0 | OUTPATIENT
Start: 2024-08-15

## 2024-08-15 RX ORDER — LANCETS
EACH MISCELLANEOUS
Qty: 150 EACH | Refills: 11 | OUTPATIENT
Start: 2024-08-15

## 2024-08-15 NOTE — TELEPHONE ENCOUNTER
Please send prescription for testing supplies  Dexcom was gonna cost $200 - patient is responsible for 20% of the cost  Also patient is not using the tresiba or novolog because he was not sure how to use them - educated today at time of visit  Patient does not have a glucometer and supplies - occasionally will use mom's machine  Referral placed for Psych - depressed mood, sometimes feels like he wants to give up  No suicidal ideations at present, used to in the past.

## 2024-08-16 ENCOUNTER — OFFICE VISIT (OUTPATIENT)
Dept: DIABETES | Facility: CLINIC | Age: 34
End: 2024-08-16
Payer: MEDICARE

## 2024-08-16 ENCOUNTER — TELEPHONE (OUTPATIENT)
Dept: DIABETES | Facility: CLINIC | Age: 34
End: 2024-08-16

## 2024-08-16 ENCOUNTER — PATIENT MESSAGE (OUTPATIENT)
Dept: DIABETES | Facility: CLINIC | Age: 34
End: 2024-08-16

## 2024-08-16 VITALS — WEIGHT: 315 LBS | BODY MASS INDEX: 47.74 KG/M2 | HEIGHT: 68 IN

## 2024-08-16 DIAGNOSIS — G47.33 OSA (OBSTRUCTIVE SLEEP APNEA): ICD-10-CM

## 2024-08-16 DIAGNOSIS — B20 HIV INFECTION, UNSPECIFIED SYMPTOM STATUS: ICD-10-CM

## 2024-08-16 DIAGNOSIS — Z86.39 H/O VITAMIN D DEFICIENCY: ICD-10-CM

## 2024-08-16 DIAGNOSIS — E66.01 MORBID OBESITY WITH BMI OF 60.0-69.9, ADULT: ICD-10-CM

## 2024-08-16 DIAGNOSIS — E11.65 TYPE 2 DIABETES MELLITUS WITH HYPERGLYCEMIA, WITH LONG-TERM CURRENT USE OF INSULIN: Primary | ICD-10-CM

## 2024-08-16 DIAGNOSIS — Z91.199 NONCOMPLIANCE WITH DIABETES TREATMENT: ICD-10-CM

## 2024-08-16 DIAGNOSIS — E55.9 VITAMIN D DEFICIENCY: ICD-10-CM

## 2024-08-16 DIAGNOSIS — E11.42 TYPE 2 DIABETES MELLITUS WITH DIABETIC POLYNEUROPATHY, WITH LONG-TERM CURRENT USE OF INSULIN: ICD-10-CM

## 2024-08-16 DIAGNOSIS — I10 PRIMARY HYPERTENSION: ICD-10-CM

## 2024-08-16 DIAGNOSIS — Z79.4 TYPE 2 DIABETES MELLITUS WITH HYPERGLYCEMIA, WITH LONG-TERM CURRENT USE OF INSULIN: Primary | ICD-10-CM

## 2024-08-16 DIAGNOSIS — R53.82 CHRONIC FATIGUE: ICD-10-CM

## 2024-08-16 DIAGNOSIS — Z71.9 HEALTH EDUCATION/COUNSELING: ICD-10-CM

## 2024-08-16 DIAGNOSIS — Z79.4 TYPE 2 DIABETES MELLITUS WITH DIABETIC POLYNEUROPATHY, WITH LONG-TERM CURRENT USE OF INSULIN: ICD-10-CM

## 2024-08-16 DIAGNOSIS — E11.9 TYPE 2 DIABETES MELLITUS WITHOUT COMPLICATION, WITHOUT LONG-TERM CURRENT USE OF INSULIN: ICD-10-CM

## 2024-08-16 DIAGNOSIS — E78.5 DYSLIPIDEMIA, GOAL LDL BELOW 100: ICD-10-CM

## 2024-08-16 DIAGNOSIS — E66.01 CLASS 3 SEVERE OBESITY DUE TO EXCESS CALORIES WITH SERIOUS COMORBIDITY AND BODY MASS INDEX (BMI) OF 60.0 TO 69.9 IN ADULT: ICD-10-CM

## 2024-08-16 RX ORDER — BLOOD-GLUCOSE,RECEIVER,CONT
1 EACH MISCELLANEOUS CONTINUOUS
Qty: 1 EACH | Refills: 0 | Status: SHIPPED | OUTPATIENT
Start: 2024-08-16 | End: 2025-08-16

## 2024-08-16 RX ORDER — LANCETS
EACH MISCELLANEOUS
Qty: 150 EACH | Refills: 11 | Status: SHIPPED | OUTPATIENT
Start: 2024-08-16

## 2024-08-16 RX ORDER — INSULIN ASPART 100 [IU]/ML
INJECTION, SOLUTION INTRAVENOUS; SUBCUTANEOUS
Qty: 15 ML | Refills: 6 | Status: SHIPPED | OUTPATIENT
Start: 2024-08-16

## 2024-08-16 RX ORDER — INSULIN PUMP SYRINGE, 3 ML
EACH MISCELLANEOUS
Qty: 1 EACH | Refills: 0 | Status: SHIPPED | OUTPATIENT
Start: 2024-08-16

## 2024-08-16 RX ORDER — BLOOD-GLUCOSE SENSOR
1 EACH MISCELLANEOUS
Qty: 2 EACH | Refills: 11 | Status: SHIPPED | OUTPATIENT
Start: 2024-08-16 | End: 2025-08-16

## 2024-08-16 RX ORDER — METFORMIN HYDROCHLORIDE 1000 MG/1
1000 TABLET ORAL 2 TIMES DAILY WITH MEALS
Qty: 180 TABLET | Refills: 3 | Status: SHIPPED | OUTPATIENT
Start: 2024-08-16 | End: 2025-08-16

## 2024-08-16 RX ORDER — PEN NEEDLE, DIABETIC 30 GX3/16"
1 NEEDLE, DISPOSABLE MISCELLANEOUS 4 TIMES DAILY
Qty: 150 EACH | Refills: 11 | Status: SHIPPED | OUTPATIENT
Start: 2024-08-16

## 2024-08-16 RX ORDER — ATORVASTATIN CALCIUM 80 MG/1
80 TABLET, FILM COATED ORAL NIGHTLY
Qty: 90 TABLET | Refills: 2 | Status: SHIPPED | OUTPATIENT
Start: 2024-08-16 | End: 2025-08-16

## 2024-08-16 RX ORDER — ERGOCALCIFEROL 1.25 MG/1
50000 CAPSULE ORAL
Qty: 12 CAPSULE | Refills: 1 | Status: SHIPPED | OUTPATIENT
Start: 2024-08-16

## 2024-08-16 RX ORDER — SEMAGLUTIDE 1.34 MG/ML
1 INJECTION, SOLUTION SUBCUTANEOUS
Qty: 3 ML | Refills: 11 | Status: SHIPPED | OUTPATIENT
Start: 2024-08-16 | End: 2025-08-16

## 2024-08-16 NOTE — TELEPHONE ENCOUNTER
----- Message from Kenzie Abernathy sent at 8/16/2024 11:26 AM CDT -----  Contact: [phone]  Patient is returning a phone call.    Who left a message for the patient: CHAD Lees     Does patient know what this is regarding:  no    Would you like a call back, or a response through your [facility] portal?:   phone    Comments:

## 2024-08-16 NOTE — ASSESSMENT & PLAN NOTE
On statin per ADA recommendations  LDL goal < 100.   Cholesterol above goal but reports that he has not been getting the Lipitor  Will resume his previous Lipitor dose of 80 mg nightly and repeat lipids with RTC

## 2024-08-16 NOTE — ASSESSMENT & PLAN NOTE
BP goal is < 140/90.   Tolerating ACEi  Blood pressure goals discussed with patient  Encouraged patient to monitor blood pressure at home

## 2024-08-16 NOTE — ASSESSMENT & PLAN NOTE
Body mass index is 62.34 kg/m².  Increases insulin resistance.   Discussed DM diet and exercise.

## 2024-08-16 NOTE — TELEPHONE ENCOUNTER
----- Message from Oralia Lees NP sent at 8/16/2024 10:39 AM CDT -----  Bmp in 4-5 weeks prior   Follow up with me in 3 months with labs prior

## 2024-08-16 NOTE — PROGRESS NOTES
The patient location is: [facility]   The chief complaint leading to consultation is: Diabetes management- follow up     Visit type: audiovisual    Face to Face time with patient: 18 minutes   30  minutes of total time spent on the encounter, which includes face to face time and non-face to face time preparing to see the patient (eg, review of tests), Obtaining and/or reviewing separately obtained history, Documenting clinical information in the electronic or other health record, Independently interpreting results (not separately reported) and communicating results to the patient/family/caregiver, or Care coordination (not separately reported).         Each patient to whom he or she provides medical services by telemedicine is:  (1) informed of the relationship between the physician and patient and the respective role of any other health care provider with respect to management of the patient; and (2) notified that he or she may decline to receive medical services by telemedicine and may withdraw from such care at any time.    Notes:       CC:   Chief Complaint   Patient presents with    Diabetes Mellitus       HPI: Mickey D Jeansonne is a 33 y.o. male presents for a follow-up visit today for the management of Diabetes     He was diagnosed with Type 2 diabetes in 2019 on routine labs when he had HA and fatigue.       Family hx of diabetes: mother and father ( )   Hospitalized for diabetes--yes  [facility]HS 2024  Insulin therapy--insulin was prescribed after HHS in 2024      No personal or FH of thyroid cancer or personal of pancreatic cancer or pancreatitis.       Patient was recently hospitalized on 2024 for hyperosmolar hyperglycemic state  He was sent home on insulin therapy however he never picked up the insulin       Our 1st and last visit was in 2024  At that visit we continued the metformin twice a day  Restarted his Ozempic  Start him on Levemir 30 units nightly  Discussed using the  NovoLog sliding scale as needed  Ordered him the Dexcom G7---however he never started it due to cost---reports that he needs to pay 20% of the cost of the Dexcom and it was going to be 200 dollars---he has Medicare and Medicaid but the Medicaid only covers pharmacy and Medicare the Dexcom has to go to DME--so the Medicaid is not providing coverage  His A1c did improve from 8.8% to 7.1%  He never started the levemir or the tresiba  He met with education yesterday to learn how to use the pens but never started   Denies GI upset with the ozempic 1 mg weekly   Blood sugar on labs was 130   Liver enzymes have improved with better blood sugar control   In may his PCP increased his ozempic to 1 mg weekly -- no SE         DIABETES COMPLICATIONS: peripheral neuropathy    Urine MAC + X 1     Diabetes Management Status    ASA:  No    Statin: Taking--Lipitor 80 mg  ACE/ARB: Taking--benazepril 20 mg     The ASCVD Risk score (Jerome ANDERSON, et al., 2019) failed to calculate for the following reasons:    The 2019 ASCVD risk score is only valid for ages 40 to 79      Screening or Prevention Patient's value Goal Complete/Controlled?   HgA1C Testing and Control   Lab Results   Component Value Date    HGBA1C 7.1 (H) 08/01/2024    HGBA1C 7.1 (H) 08/01/2024      Annually/Less than 8% No   Lipid profile : 08/01/2024 Annually Yes   LDL control Lab Results   Component Value Date    LDLCALC 153.6 08/01/2024    Annually/Less than 100 mg/dl  No   Nephropathy screening Lab Results   Component Value Date    LABMICR 103.0 08/01/2024     Lab Results   Component Value Date    PROTEINUA Negative 05/28/2024    Annually Yes   Blood pressure BP Readings from Last 1 Encounters:   06/13/24 128/78    Less than 140/90 No   Dilated retinal exam : 06/13/2024 Annually Yes   Foot exam   : 04/25/2024 Annually No       CURRENT A1C:    Hemoglobin A1C   Date Value Ref Range Status   08/01/2024 7.1 (H) 4.0 - 5.6 % Final     Comment:     ADA Screening  Guidelines:  5.7-6.4%  Consistent with prediabetes  >or=6.5%  Consistent with diabetes    High levels of fetal hemoglobin interfere with the HbA1C  assay. Heterozygous hemoglobin variants (HbS, HgC, etc)do  not significantly interfere with this assay.   However, presence of multiple variants may affect accuracy.     08/01/2024 7.1 (H) 4.0 - 5.6 % Final     Comment:     ADA Screening Guidelines:  5.7-6.4%  Consistent with prediabetes  >or=6.5%  Consistent with diabetes    High levels of fetal hemoglobin interfere with the HbA1C  assay. Heterozygous hemoglobin variants (HbS, HgC, etc)do  not significantly interfere with this assay.   However, presence of multiple variants may affect accuracy.     05/28/2024 8.8 (H) 4.0 - 5.6 % Final     Comment:     ADA Screening Guidelines:  5.7-6.4%  Consistent with prediabetes  >or=6.5%  Consistent with diabetes    High levels of fetal hemoglobin interfere with the HbA1C  assay. Heterozygous hemoglobin variants (HbS, HgC, etc)do  not significantly interfere with this assay.   However, presence of multiple variants may affect accuracy.         GOAL A1C: 6.5% without hypoglycemia      DM MEDICATIONS USED IN THE PAST:  NovoLog, metformin, Ozempic  Levemir   Tresiba   Dexocm - too expensive         CURRENT DIABETES MEDICATIONS:    Metformin 1000 mg 2 times per day   Ozempic 1 mg weekly on Fridays   Never started the insulin at all. Never did the Levemir, Tresiba or Novlog     Insulin:N/A   Missed doses: Yes- never started the insulin         BLOOD GLUCOSE MONITORING:    Not checking his BG consistently   His mother has been checking it periodically   Per oral recall: cannot remember the number       Mom checked it while we were talking today on the visit BG was 177        HYPOGLYCEMIA:  denies      MEALS: eating 2 meals per day   BF: skips   Lunch: baked meats, veggies, and fruits   Dinner: same as lunch   Snack: rarely reported   Drinks: water  Lots of juice before admission           CURRENT EXERCISE:  No        Review of Systems  Review of Systems   Constitutional:  Negative for appetite change, fatigue and unexpected weight change.   HENT:  Negative for trouble swallowing.    Eyes:  Negative for visual disturbance.   Respiratory:  Negative for shortness of breath.    Cardiovascular:  Positive for leg swelling. Negative for chest pain.   Gastrointestinal:  Negative for abdominal distention, abdominal pain, constipation, diarrhea, nausea and vomiting.   Endocrine: Negative for polydipsia, polyphagia and polyuria.   Genitourinary:         No Nocturia    Skin:  Negative for wound.   Neurological:  Negative for numbness.       Physical Exam   Physical Exam  Constitutional:       General: He is not in acute distress.     Appearance: Normal appearance. He is not ill-appearing.   HENT:      Head: Normocephalic and atraumatic.      Nose: Nose normal.   Pulmonary:      Effort: Pulmonary effort is normal.   Neurological:      General: No focal deficit present.      Mental Status: He is alert and oriented to person, place, and time.   Psychiatric:         Mood and Affect: Mood normal.         Behavior: Behavior normal.         Thought Content: Thought content normal.         Judgment: Judgment normal.             FOOT EXAMINATION:  Deferred due to virtual visit          Lab Results   Component Value Date    TSH 1.110 05/28/2024           Type 2 diabetes mellitus with hyperglycemia, with long-term current use of insulin  A1c has greatly improved.   Down to 7.1%   NEVER STARTED THE INSULIN !   He did not receive the Dexcom because it was too expensive  We discussed that we could try the Luis A 3 and send it to DME  We discussed trying S GLT 2 instead of moving forward with insulin therapy at this time since his blood sugars have greatly improved with just metformin and Ozempic alone  He is tolerating the Ozempic 1 mg weekly.  He was hesitant to up to 2 mg at this time    Urine MAC + X 1       --  Medication Changes:   Continue metformin 1000 mg 2 times per day     Continue Ozempic 1 mg weekly      Start Jardiance 10 mg daily  -- Please let us know if you have nausea, vomiting, or weakness with a normal BG. This could be euglycemic DKA.  -- please hold medication 3 days prior to surgery or if you are sick and have decreased intake.   -- Please drink lots of water daily while on this medication.   --This medication could also give you a yeast infection- please let us know if this occurs and we can treat it.   -- no keto diet     Stay off the Levemir and Tresiba for now     Can use the correction scale if blood sugars are high before meals    With using correction scale: Novlog only as needed     200-230: +2 units  231-280: +4 units  281-330: +6 units  331-380: +8 units  >380: +10 units        -- Reviewed goals of therapy are to get the best control we can without hypoglycemia.  -- Reviewed patient's current insulin regimen. Clarified proper insulin dose and timing in relation to meals, etc. Insulin injection sites and proper rotation instructed.    -- Advised frequent self blood glucose monitoring.  Patient encouraged to document glucose results and bring them to every clinic visit.  Dexcom was too expensive through DME  Will try Luis A 3 through pharmacy---but discussed that it may not be covered  -- Hypoglycemia precautions discussed. Instructed on precautions before driving.    -- Call for Bg repeatedly < 70 or > 180.   -- Close adherence to lifestyle changes recommended.   -- Periodic follow ups for eye evaluations, foot care and dental care suggested.  -- Refer to diabetes education-- if he starts the Luis A 3 he will need teaching      Patient has diabetes mellitus and manages diabetes with intensive insulin regimen and uses prandial  daily---sliding scale t.i.d. a.c. p.r.n.  Patient requires a therapeutic CGM and is willing to use therapeutic CGM for the necessary frequent adjustments of insulin therapy.   I have completed an in-person visit during the previous 6 months and will continue to have in-person visits every 6 months to assess adherence to their CGM regimen and diabetes treatment plan.  Due to COVID pandemic and need for remote monitoring this tool is medically necessary      Hypertension  BP goal is < 140/90.   Tolerating ACEi  Blood pressure goals discussed with patient  Encouraged patient to monitor blood pressure at home    HIV (human immunodeficiency virus infection)  Increases insulin resistance  Continue to follow with Infectious Disease    Class 3 severe obesity due to excess calories with serious comorbidity and body mass index (BMI) of 60.0 to 69.9 in adult  Body mass index is 62.34 kg/m².  Increases insulin resistance.   Discussed DM diet and exercise.       GASTON (obstructive sleep apnea)  If uncontrolled increases insulin resistance  Not using CPAP machine     Dyslipidemia, goal LDL below 100  On statin per ADA recommendations  LDL goal < 100.   Cholesterol above goal but reports that he has not been getting the Lipitor  Will resume his previous Lipitor dose of 80 mg nightly and repeat lipids with RTC    Type 2 diabetes mellitus with diabetic polyneuropathy, with long-term current use of insulin  Optimize BG readings.   See above.   On gabapentin     Educated patient to check feet daily for any foreign objects and/or wounds. Discussed with patient the importance of wearing appropriate footwear at all times, not to walk barefoot ever, and to check shoes before putting them on feet. Instructed patient to keep feet dry by regularly changing shoes and socks and drying feet after baths and exercises. Also, instructed patient to report any new lesions, discolorations, or swelling to a healthcare professional.        His medications were sent to [facility] in [place] for him to get the mail to him to help with medication compliance and issues      I spent a total of 30 minutes on the day of the  visit.This includes face to face time and non-face to face time preparing to see the patient (eg, review of tests), obtaining and/or reviewing separately obtained history, documenting clinical information in the electronic or other health record, independently interpreting results and communicating results to the patient/family/caregiver, or care coordinator.          Follow up in about 3 months (around 11/16/2024).  Bmp in 4-5 weeks prior   Follow up with me in 3 months with labs prior         Orders Placed This Encounter   Procedures    Basic Metabolic Panel     Standing Status:   Future     Standing Expiration Date:   2/16/2026     Order Specific Question:   Send normal result to authorizing provider's In Basket if patient is active on MyChart:     Answer:   Yes    Vitamin D     Standing Status:   Future     Standing Expiration Date:   2/16/2026    Hemoglobin A1C     Standing Status:   Future     Standing Expiration Date:   2/16/2026    Comprehensive Metabolic Panel     Standing Status:   Future     Standing Expiration Date:   2/16/2026    Lipid Panel     Standing Status:   Future     Standing Expiration Date:   2/16/2026    Microalbumin/Creatinine Ratio, Urine     Standing Status:   Future     Standing Expiration Date:   2/16/2026     Order Specific Question:   Specimen Source     Answer:   Urine       Recommendations were discussed with the patient in detail  The patient verbalized understanding and agrees with the plan outlined as above.     This note was partly generated with [employer] voice recognition software. I apologize for any possible typographical errors.

## 2024-08-16 NOTE — ASSESSMENT & PLAN NOTE
A1c has greatly improved.   Down to 7.1%   NEVER STARTED THE INSULIN !   He did not receive the Dexcom because it was too expensive  We discussed that we could try the Luis A 3 and send it to DME  We discussed trying S GLT 2 instead of moving forward with insulin therapy at this time since his blood sugars have greatly improved with just metformin and Ozempic alone  He is tolerating the Ozempic 1 mg weekly.  He was hesitant to up to 2 mg at this time    Urine MAC + X 1       -- Medication Changes:   Continue metformin 1000 mg 2 times per day     Continue Ozempic 1 mg weekly      Start Jardiance 10 mg daily  -- Please let us know if you have nausea, vomiting, or weakness with a normal BG. This could be euglycemic DKA.  -- please hold medication 3 days prior to surgery or if you are sick and have decreased intake.   -- Please drink lots of water daily while on this medication.   --This medication could also give you a yeast infection- please let us know if this occurs and we can treat it.   -- no keto diet     Stay off the Levemir and Tresiba for now     Can use the correction scale if blood sugars are high before meals    With using correction scale: Novlog only as needed     200-230: +2 units  231-280: +4 units  281-330: +6 units  331-380: +8 units  >380: +10 units        -- Reviewed goals of therapy are to get the best control we can without hypoglycemia.  -- Reviewed patient's current insulin regimen. Clarified proper insulin dose and timing in relation to meals, etc. Insulin injection sites and proper rotation instructed.    -- Advised frequent self blood glucose monitoring.  Patient encouraged to document glucose results and bring them to every clinic visit.  Dexcom was too expensive through DME  Will try Luis A 3 through pharmacy---but discussed that it may not be covered  -- Hypoglycemia precautions discussed. Instructed on precautions before driving.    -- Call for Bg repeatedly < 70 or > 180.   -- Close  adherence to lifestyle changes recommended.   -- Periodic follow ups for eye evaluations, foot care and dental care suggested.  -- Refer to diabetes education-- if he starts the Luis A 3 he will need teaching      Patient has diabetes mellitus and manages diabetes with intensive insulin regimen and uses prandial  daily---sliding scale t.i.d. a.c. p.r.n.  Patient requires a therapeutic CGM and is willing to use therapeutic CGM for the necessary frequent adjustments of insulin therapy.  I have completed an in-person visit during the previous 6 months and will continue to have in-person visits every 6 months to assess adherence to their CGM regimen and diabetes treatment plan.  Due to COVID pandemic and need for remote monitoring this tool is medically necessary

## 2024-08-19 NOTE — PROGRESS NOTES
Diabetes Care Specialist Progress Note  Author: Suly Whyte RD, CDE  Date: 8/19/2024    Intake    Program Intake  Reason for Diabetes Program Visit:: Initial Diabetes Assessment  Current diabetes risk level:: low (HgbA1c 7.1)  In the last 12 months, have you:: been admitted to a hospital  Was the ER or hospital admission related to diabetes?: Yes  Permission to speak with others about care:: no    Current Diabetes Treatment: Oral Medications, DM Injectables  Oral Medication Type/Dose: metformin 1000mg twice a day  DM Injectables Type/Dose: ozempic 0.25 mg a week, increased to 1 mg on Friday    Continuous Glucose Monitoring  Patient has CGM: No (cost is an issue)  Personal CGM type:: Dexcom    Lab Results   Component Value Date    HGBA1C 7.1 (H) 08/01/2024    HGBA1C 7.1 (H) 08/01/2024               There is no height or weight on file to calculate BMI.    Lifestyle Coping Support & Clinical    Lifestyle/Coping/Support  Compared to other people your age, how would you rate your health?: Poor  Does anyone in your family have diabetes or does anyone in your family support you in your diabetes care?: FH: parents and siblings; Support System: mother  List anything about Diabetes that causes you stress?: not about diabetes  How do you deal with stress/distress?: listen to music - hard to distress  Learning Barriers:: None  Culture or Yarsani beliefs that may impact ability to access healthcare: No  Psychosocial/Coping Skills Assessment Completed: : Yes  Assessment indicates:: Adequate understanding, Instruction Needed  Area of need?: Yes    Problem Review  Active Comorbidities: Hyperlipidemia/Dyslipidemia, Other (comment), Obesity, Neuropathy, Hypertension (Gout, GASTON - machine was taken away because patient was not using enough, HIV, Ankle Edema)    Diabetes Self-Management Skills Assessment    Medication Skills Assessment  Patient is able to identify current diabetes medications, dosages, and appropriate timing of  medications.: yes  Patient reports problems or concerns with current medication regimen.: yes  Medication regimen problems/concerns:: financial concerns (not taking the tresiba, novolog, or baqsimi)  Pharmacy assistance referral placed?: Yes  Patient is  aware that some diabetes medications can cause low blood sugar?: Yes  Medication Skills Assessment Completed:: Yes  Assessment indicates:: Instruction Needed  Area of need?: Yes    Diabetes Disease Process/Treatment Options  Diabetes Type?: Type II  When were you diagnosed?: 2019  If previous diabetes education, when/where:: no  What are your goals for this education session?: lose weight  Is patient aware of what causes diabetes?: Yes  Does patient understand the pathophysiology of diabetes?: No  Diabetes Disease Process/Treatment Options: Skills Assessment Completed: Yes  Assessment indicates:: Adequate understanding, Instruction Needed  Area of need?: Yes    Nutrition/Healthy Eating  Meal Plan 24 Hour Recall - Breakfast: skipped  Meal Plan 24 Hour Recall - Lunch: 2 packs of Ramen or 8-10 chicken nuggets with a medium dobbs  Meal Plan 24 Hour Recall - Dinner: tuna fish with crackers and chips or a burger and fries  Meal Plan 24 Hour Recall - Snack: candy or chips  Meal Plan 24 Hour Recall - Beverage: water, gatorade, fruit punch, coke, occassionally a diet coke  Who shops/cooks?: mom  Patient can identify foods that impact blood sugar.: yes  Challenges to healthy eating:: eating out, going to parties, eating when feeling depressed/stressed, lack of will power, portion control, snacking between meals and at night  Nutrition/Healthy Eating Skills Assessment Completed:: Yes  Assessment indicates:: Instruction Needed, Adequate understanding  Area of need?: Yes    Physical Activity/Exercise  Patient's daily activity level:: sedentary  Patient formally exercises outside of work.: no  Reasons for not exercising:: other (see comments) (no desire to exercise most  days)  Patient can identify forms of physical activity.: yes  Physical Activity/Exercise Skills Assessment Completed: : Yes  Assessment indicates:: Adequate understanding, Instruction Needed  Area of need?: Yes    Home Blood Glucose Monitoring  Patient states that blood sugar is checked at home daily.: no  Personal CGM type:: Dexcom  What is your A1c [employer]?: 7.0  Home Blood Glucose Monitoring Skills Assessment Completed: : Yes  Assessment indicates:: Adequate understanding, Instruction Needed  Area of need?: Yes    Acute Complications  Have you ever had hypoglycemia (low BG 70 or less)?: no  Do you know the symptoms of low blood sugar and how to treat?: shaky  Have you ever had hyperglycemia (high  or more)?: yes  How often and what are your symptoms?: unknown  How do you treat hyperglycemia? : take medicine will check sometimes with mom's glucometer   Do you know the symptoms of high blood sugar and how to treat: tired and thirsty, take medication  Have you ever had DKA?: no  Do you ever test for ketones?: no  Do you have a sick day plan?: no  Acute Complications Skills Assessment Completed: : Yes  Assessment indicates:: Instruction Needed, Adequate understanding  Area of need?: Yes    Chronic Complications  Reviewed health maintenance: yes  Have you completed your annual diabetes maintenance labwork? : yes  Do you examine your feet daily?: no  Has your doctor examined your feet?: yes  Do you see a Dentist?: no  Do you see an eye doctor?: no  Chronic Complications Skills Assessment Completed: : Yes  Assessment indicates:: Instruction Needed  Area of need?: Yes    Sometimes wants to give up  Stays in bed all day  Was on depression medications in the past  Was seeing a counselor but they left [facility] and was not reassigned  Does not have anyone to talk to, does not trust many people  Loves to listen to music - this is his escape, helps him to feel better  Lots of trouble sleeping  Lost dad in 2016  Lost job in  2019  Has no energy to move around  Was on vitamin D but quit taking it  Afraid of gastric sleeve because aunt had it and is all the time in the hospital now  Legs swell a lot and give out on patient so he is unable to exercise  Tries to do 5-10 minutes of activity a day    Assessment Summary and Plan    Based on today's diabetes care assessment, the following areas of need were identified:          8/15/2024    12:01 AM   Areas of Need   Medications/Current Diabetes Treatment Yes, Discussed MOA, onset, side effects, dosage of med: metformin and ozempic  Provided with strategies for daily medication adherence (phone alarms, medication reminder apps, medication list, pill organizer, pill packing, pharmacy delivery options).   Discussed any concerns about regimen.   Reviewed significance of mealtimes and medication administration.   Answered patient's questions regarding if he will ever be able to get off medication.  Reviewed need for medication and challenges of glucose control with steroid treatments and stress.  The patient was instructed on storage of insulin and/or injectable medication, precautions related to hyper/hypoglycemia.   Patient was given instructions on when/who to call with problems.   Injection sites were discussed, and patient was instructed on importance of rotating sites.  Recommend increasing ozempic or switching to mounjaro   Lifestyle Coping Support Yes, Covered the following coping strategies to help deal with chronic disease.   Provided current list of diabetes support group meeting locations and contact information, written online and community resources   Discussed role stress on blood sugar control and diabetes health   Discussed role sleep on health and diabetes control  Discussed feelings associated with chronic disease management  Consider talking with family or close friend  Consider talking with certified counselor, psychologist or psychiatrist  Recommended ways to reduce stress  such as: reading a book or magazine, enjoy a cup of tea or coffee, take a walk, journal, draw or color, paint, play an instrument, singing, gardening, crafting, woodwork, prayer, meditation, yoga or eloise chi, develop a sleep schedule    Referral placed for psychiatry    Diabetes Disease Process/Treatment Options Yes, Patient educated on what is DM, T1DM, T2DM, risk factors, managing DM  Reviewed pathophysiology of type 2 diabetes, complications related to the disease, importance of annual dilated eye exam, and daily foot exam.   Nutrition/Healthy Eating Yes, Reviewed carb counting, portion control, importance of spacing meals throughout the day to prevent post prandial elevations.  Recommended low saturated fat, low sodium diet to aid in control of hypertension and cholesterol. Reviewed plate method and portion control, dining out tips, meal planning, reading a food label, healthy snack options, benefits of physical activity   Physical Activity/Exercise Yes, Physical Activity Suggestions:   Dog walking,   House cleaning,   Dancing   Yard work/gardening   Swimming,   Brisk walking in neighborhood or at local mall,   Gym Membership/Silver Sneakers Program,   Community Resources/[employer] [facility]    Reasons exercise/physical activity are important to diabetes management:  Helps insulin work better  Improves blood circulation  Keeps body and joints flexible  Lowers blood glucose, blood pressure, and cholesterol  Lowers risk of heart disease and stroke  Relieves stress  Strengthens heart, muscles, and bones  Tones muscles   Home Blood Glucose Monitoring Yes, reviewed importance of monitoring glucose, sent message to provider to order supplies through [facility], provider's support staff denied request    Acute Complications Yes, briefly reviewed, Reviewed blood glucose goals, prevention, detection, signs and symptoms, and treatment of hypoglycemia and hyperglycemia, and when to contact the  clinic.  Need to revisit at follow-up   Chronic Complications Yes, briefly discussed importance of A1c less than 7 to reduce risk of micro and macro complications, including nephropathy, neuropathy, retinopathy, heart attack and stroke. Reviewed the importance of controlled Blood Pressure and Cholesterol Lab Values in preventing disease.   Health maintenance reviewed  Need to revisit at follow-up       Today's interventions were provided through individual discussion, instruction, and written materials were provided.      Patient verbalized understanding of instruction and written materials.  Pt was able to return back demonstration of instructions today. Patient understood key points, needs reinforcement and further instruction.     Diabetes Self-Management Care Plan:    Today's Diabetes Self-Management Care Plan was developed with Gold's input. Gold has agreed to work toward the following goal(s) to improve his/her overall diabetes control.      Care Plan: Diabetes Management   Updates made since 7/20/2024 12:00 AM        Problem: Healthy Eating         Goal: Patient will reduce the number of sugary beverages consumed each day to 1-2 for the next 6 months    Start Date: 8/15/2024   Expected End Date: 2/19/2025   This Visit's Progress: Deferred   Priority: High   Barriers: Lack of Motivation to Change        Task: Reviewed the sources and role of Carbohydrate, Protein, and Fat and how each nutrient impacts blood sugar. Completed 8/19/2024        Task: Provided visual examples using dry measuring cups, food models, and other familiar objects such as computer mouse, deck or cards, tennis ball etc. to help with visualization of portions. Completed 8/19/2024        Task: Explained how to count carbohydrates using the food label and the use of dry measuring cups for accurate carb counting. Completed 8/19/2024        Task: Discussed strategies for choosing healthier menu options when dining out. Completed 8/19/2024         Task: Recommended replacing beverages containing high sugar content with noncaloric/sugar free options and/or water. Completed 8/19/2024        Task: Review the importance of balancing carbohydrates with each meal using portion control techniques to count servings of carbohydrate and label reading to identify serving size and amount of total carbs per serving. Completed 8/19/2024        Task: Provided Sample plate method and reviewed the use of the plate to estimate amounts of carbohydrate per meal. Completed 8/19/2024          Follow Up Plan     Follow up in about 4 weeks (around 9/12/2024) for General Follow-up, Blood Sugar Log Review and F/U MNT, Food and Exercise Log Review and F/U MNT.    Today's care plan and follow up schedule was discussed with patient.  Gold verbalized understanding of the care plan, goals, and agrees to follow up plan.        The patient was encouraged to communicate with his/her health care provider/physician and care team regarding his/her condition(s) and treatment.  I provided the patient with my contact information today and encouraged to contact me via phone or [facility] Patient Portal as needed.     Length of Visit   Total Time: 70 Minutes

## 2024-08-20 ENCOUNTER — OFFICE VISIT (OUTPATIENT)
Dept: PRIMARY CARE CLINIC | Facility: CLINIC | Age: 34
End: 2024-08-20
Payer: MEDICARE

## 2024-08-20 VITALS
WEIGHT: 315 LBS | SYSTOLIC BLOOD PRESSURE: 138 MMHG | RESPIRATION RATE: 18 BRPM | OXYGEN SATURATION: 98 % | HEIGHT: 68 IN | HEART RATE: 96 BPM | BODY MASS INDEX: 47.74 KG/M2 | DIASTOLIC BLOOD PRESSURE: 70 MMHG

## 2024-08-20 DIAGNOSIS — R51.9 NONINTRACTABLE HEADACHE, UNSPECIFIED CHRONICITY PATTERN, UNSPECIFIED HEADACHE TYPE: ICD-10-CM

## 2024-08-20 DIAGNOSIS — L01.00 IMPETIGO: Primary | ICD-10-CM

## 2024-08-20 DIAGNOSIS — Z79.4 TYPE 2 DIABETES MELLITUS WITH HYPERGLYCEMIA, WITH LONG-TERM CURRENT USE OF INSULIN: ICD-10-CM

## 2024-08-20 DIAGNOSIS — B20 HIV INFECTION, UNSPECIFIED SYMPTOM STATUS: ICD-10-CM

## 2024-08-20 DIAGNOSIS — E11.65 TYPE 2 DIABETES MELLITUS WITH HYPERGLYCEMIA, WITH LONG-TERM CURRENT USE OF INSULIN: ICD-10-CM

## 2024-08-20 PROCEDURE — 99214 OFFICE O/P EST MOD 30 MIN: CPT | Mod: S$PBB,,, | Performed by: INTERNAL MEDICINE

## 2024-08-20 PROCEDURE — 99999 PR PBB SHADOW E&M-EST. PATIENT-LVL III: CPT | Mod: PBBFAC,,, | Performed by: INTERNAL MEDICINE

## 2024-08-20 PROCEDURE — 99213 OFFICE O/P EST LOW 20 MIN: CPT | Mod: PBBFAC,PN | Performed by: INTERNAL MEDICINE

## 2024-08-20 RX ORDER — CEPHALEXIN 500 MG/1
500 TABLET ORAL 4 TIMES DAILY
Qty: 40 TABLET | Refills: 0 | Status: SHIPPED | OUTPATIENT
Start: 2024-08-20 | End: 2024-08-30

## 2024-08-20 RX ORDER — BUTALBITAL, ACETAMINOPHEN AND CAFFEINE 50; 325; 40 MG/1; MG/1; MG/1
1 TABLET ORAL 3 TIMES DAILY PRN
Qty: 30 TABLET | Refills: 0 | Status: SHIPPED | OUTPATIENT
Start: 2024-08-20 | End: 2024-09-19

## 2024-08-20 RX ORDER — CHLORHEXIDINE GLUCONATE 40 MG/ML
SOLUTION TOPICAL DAILY PRN
Qty: 236 ML | Refills: 2 | Status: SHIPPED | OUTPATIENT
Start: 2024-08-20

## 2024-08-20 RX ORDER — MUPIROCIN 20 MG/G
OINTMENT TOPICAL 2 TIMES DAILY
Qty: 22 G | Refills: 0 | Status: SHIPPED | OUTPATIENT
Start: 2024-08-20

## 2024-08-22 NOTE — PROGRESS NOTES
Subjective:       Patient ID: Mickey D Jeansonne is a 33 y.o. male.    Chief Complaint: Headache    Pt with h/o DM gout HIV HTN obesity pt visit today for routine f/u he is having HA taht all around the head no n/v no fever chill no sob cp no n/v/d  no photophobia  no neuro deficit take OTC tylenol ibuprofen not help no wt loss gaining wt no sob cp JEAN  Headache   Pertinent negatives include no back pain.     Review of Systems   Constitutional:  Negative for diaphoresis and unexpected weight change.   HENT:  Positive for dental problem and postnasal drip.    Eyes:  Negative for visual disturbance.   Respiratory:  Negative for shortness of breath, wheezing and stridor.    Musculoskeletal:  Negative for arthralgias and back pain.   Skin:  Positive for color change and rash.   Neurological:  Positive for headaches.       Objective:      Physical Exam  Vitals and nursing note reviewed.   Constitutional:       General: He is in acute distress.      Appearance: He is well-developed.   HENT:      Head: Normocephalic and atraumatic.      Right Ear: External ear normal.      Left Ear: External ear normal.      Nose: Nose normal.   Eyes:      Extraocular Movements: Extraocular movements intact.      Conjunctiva/sclera: Conjunctivae normal.      Pupils: Pupils are equal, round, and reactive to light.   Neck:      Thyroid: No thyromegaly.   Cardiovascular:      Rate and Rhythm: Normal rate and regular rhythm.      Heart sounds: Normal heart sounds. No murmur heard.     No friction rub. No gallop.   Pulmonary:      Effort: Pulmonary effort is normal. No respiratory distress.      Breath sounds: Normal breath sounds. No wheezing.   Abdominal:      General: Bowel sounds are normal. There is no distension.      Palpations: Abdomen is soft.      Tenderness: There is no abdominal tenderness.   Musculoskeletal:         General: No tenderness or deformity. Normal range of motion.      Cervical back: Normal range of motion and neck  supple.   Lymphadenopathy:      Cervical: No cervical adenopathy.   Skin:     General: Skin is warm and dry.      Findings: No erythema or rash.      Comments: Diffuse multiple empitigo like lesion on scalpitching burning   Neurological:      Mental Status: He is alert and oriented to person, place, and time.   Psychiatric:         Mood and Affect: Mood normal.         Thought Content: Thought content normal.         Judgment: Judgment normal.         Assessment:       1. Impetigo    2. Type 2 diabetes mellitus with hyperglycemia, with long-term current use of insulin    3. Nonintractable headache, unspecified chronicity pattern, unspecified headache type    4. HIV infection, unspecified symptom status        Plan:       Impetigo  -     cephalexin (KEFTAB) 500 mg tablet; Take 1 tablet (500 mg total) by mouth 4 (four) times daily. for 10 days  Dispense: 40 tablet; Refill: 0  -     mupirocin (BACTROBAN) 2 % ointment; Apply topically 2 (two) times daily.  Dispense: 22 g; Refill: 0  -     chlorhexidine (HIBICLENS) 4 % external liquid; Apply topically daily as needed (skin infection).  Dispense: 236 mL; Refill: 2    Type 2 diabetes mellitus with hyperglycemia, with long-term current use of insulin  Comments:  faily con trolled Hgba1c 7.1 try get <7.0 diet exercise    Nonintractable headache, unspecified chronicity pattern, unspecified headache type  Comments:  MRI brain if not better  Orders:  -     butalbital-acetaminophen-caffeine -40 mg (FIORICET, ESGIC) -40 mg per tablet; Take 1 tablet by mouth 3 (three) times daily as needed for Headaches.  Dispense: 30 tablet; Refill: 0    HIV infection, unspecified symptom status  Comments:  treated by infectious ds        Medication List with Changes/Refills   New Medications    CEPHALEXIN (KEFTAB) 500 MG TABLET    Take 1 tablet (500 mg total) by mouth 4 (four) times daily. for 10 days    CHLORHEXIDINE (HIBICLENS) 4 % EXTERNAL LIQUID    Apply topically daily as needed  (skin infection).    MUPIROCIN (BACTROBAN) 2 % OINTMENT    Apply topically 2 (two) times daily.   Current Medications    ALLOPURINOL (ZYLOPRIM) 300 MG TABLET    Take 1 tablet (300 mg total) by mouth once daily.    AMLODIPINE-BENAZEPRIL 5-20 MG (LOTREL) 5-20 MG PER CAPSULE    Take 1 capsule by mouth 2 (two) times a day.    ATORVASTATIN (LIPITOR) 80 MG TABLET    Take 1 tablet (80 mg total) by mouth every evening.    BIKTARVY -25 MG PER TABLET    once daily.    BLOOD SUGAR DIAGNOSTIC STRP    To check BG 4 times daily, to use with insurance preferred meter    BLOOD-GLUCOSE METER KIT    To check BG 4 times daily, to use with insurance preferred meter    BLOOD-GLUCOSE METER,CONTINUOUS (FREESTYLE SAUNDRA 3 READER) MISC    1 Device by Misc.(Non-Drug; Combo Route) route continuous.    BLOOD-GLUCOSE SENSOR (FREESTYLE SAUNDRA 3 SENSOR) KATLIN    Use as directed. Change every 14 (fourteen) days.    CLOBETASOL (TEMOVATE) 0.05 % EXTERNAL SOLUTION    SMARTSIG:Topical Morning-Evening    CLOTRIMAZOLE-BETAMETHASONE 1-0.05% (LOTRISONE) CREAM    Apply topically 2 (two) times daily.    EMPAGLIFLOZIN (JARDIANCE) 10 MG TABLET    Take 1 tablet (10 mg total) by mouth once daily.    ERGOCALCIFEROL (ERGOCALCIFEROL) 50,000 UNIT CAP    Take 1 capsule (50,000 Units total) by mouth every 7 days.    ESCITALOPRAM OXALATE (LEXAPRO) 20 MG TABLET    Take 20 mg by mouth once daily.    FLUOXETINE 20 MG CAPSULE    Take 20 mg by mouth once daily.    FUROSEMIDE (LASIX) 40 MG TABLET    Take 1 tablet (40 mg total) by mouth 2 (two) times daily.    GABAPENTIN (NEURONTIN) 300 MG CAPSULE    Take 300 mg by mouth 3 (three) times daily.    INDOMETHACIN (INDOCIN SR) 75 MG CPSR CR CAPSULE    Take 1 capsule (75 mg total) by mouth 2 (two) times daily as needed (gout attack). 1 po BID for gout    INSULIN ASPART U-100 (NOVOLOG) 100 UNIT/ML (3 ML) INPN PEN    Inject into the skin per sliding scale 3 times daily before meals as needed. MAX total daily dose: 50 units.     "KETOCONAZOLE (NIZORAL) 2 % SHAMPOO    Apply Once weekly    LANCETS MISC    To check BG 4 times daily, to use with insurance preferred meter    METFORMIN (GLUCOPHAGE) 1000 MG TABLET    Take 1 tablet (1,000 mg total) by mouth 2 (two) times daily with meals.    METOPROLOL SUCCINATE (TOPROL-XL) 25 MG 24 HR TABLET    Take 1 tablet (25 mg total) by mouth once daily.    PEN NEEDLE, DIABETIC (BD ULTRA-FINE MINI PEN NEEDLE) 31 GAUGE X 3/16" NDLE    Use with insulin pens 4 (four) times daily.    SEMAGLUTIDE (OZEMPIC) 1 MG/DOSE (4 MG/3 ML)    Inject 1 mg into the skin every 7 days.    TRAZODONE (DESYREL) 100 MG TABLET    Take 100-200 mg by mouth nightly as needed.    TRIAMCINOLONE (KENALOG) 0.5 % OINTMENT    Apply topically 2 (two) times daily.   Changed and/or Refilled Medications    Modified Medication Previous Medication    BUTALBITAL-ACETAMINOPHEN-CAFFEINE -40 MG (FIORICET, ESGIC) -40 MG PER TABLET butalbital-acetaminophen-caffeine -40 mg (FIORICET, ESGIC) -40 mg per tablet       Take 1 tablet by mouth 3 (three) times daily as needed for Headaches.    Take 1 tablet by mouth 3 (three) times daily as needed for Headaches.        "

## 2024-08-28 ENCOUNTER — PATIENT MESSAGE (OUTPATIENT)
Dept: DIABETES | Facility: CLINIC | Age: 34
End: 2024-08-28
Payer: MEDICARE

## 2024-09-12 ENCOUNTER — PATIENT MESSAGE (OUTPATIENT)
Dept: PRIMARY CARE CLINIC | Facility: CLINIC | Age: 34
End: 2024-09-12
Payer: MEDICARE

## 2024-09-12 DIAGNOSIS — L01.00 IMPETIGO: Primary | ICD-10-CM

## 2024-09-17 RX ORDER — CEPHALEXIN 500 MG/1
500 TABLET ORAL 4 TIMES DAILY
Qty: 40 TABLET | Refills: 0 | Status: SHIPPED | OUTPATIENT
Start: 2024-09-17 | End: 2024-09-27

## 2024-09-17 NOTE — TELEPHONE ENCOUNTER
Patient notified of rx sent to [facility] and the referral placed for Dermatology with Dr. Whalen. Patient verbalized understanding

## 2024-09-20 ENCOUNTER — PATIENT MESSAGE (OUTPATIENT)
Dept: DIABETES | Facility: CLINIC | Age: 34
End: 2024-09-20
Payer: MEDICARE

## 2024-09-20 DIAGNOSIS — Z79.4 TYPE 2 DIABETES MELLITUS WITH HYPERGLYCEMIA, WITH LONG-TERM CURRENT USE OF INSULIN: ICD-10-CM

## 2024-09-20 DIAGNOSIS — E11.65 TYPE 2 DIABETES MELLITUS WITH HYPERGLYCEMIA, WITH LONG-TERM CURRENT USE OF INSULIN: ICD-10-CM

## 2024-09-25 ENCOUNTER — PATIENT MESSAGE (OUTPATIENT)
Dept: DIABETES | Facility: CLINIC | Age: 34
End: 2024-09-25
Payer: MEDICARE

## 2024-10-15 ENCOUNTER — PATIENT MESSAGE (OUTPATIENT)
Dept: DIABETES | Facility: CLINIC | Age: 34
End: 2024-10-15
Payer: MEDICARE

## 2024-10-28 ENCOUNTER — PATIENT MESSAGE (OUTPATIENT)
Dept: PRIMARY CARE CLINIC | Facility: CLINIC | Age: 34
End: 2024-10-28
Payer: MEDICARE

## 2024-11-14 ENCOUNTER — PATIENT MESSAGE (OUTPATIENT)
Dept: PRIMARY CARE CLINIC | Facility: CLINIC | Age: 34
End: 2024-11-14
Payer: MEDICARE

## 2024-12-13 ENCOUNTER — TELEPHONE (OUTPATIENT)
Dept: DIABETES | Facility: CLINIC | Age: 34
End: 2024-12-13
Payer: MEDICARE

## 2024-12-16 ENCOUNTER — OFFICE VISIT (OUTPATIENT)
Dept: DIABETES | Facility: CLINIC | Age: 34
End: 2024-12-16
Payer: MEDICARE

## 2024-12-16 VITALS — BODY MASS INDEX: 61.12 KG/M2 | WEIGHT: 315 LBS

## 2024-12-16 DIAGNOSIS — Z71.9 HEALTH EDUCATION/COUNSELING: ICD-10-CM

## 2024-12-16 DIAGNOSIS — E66.813 CLASS 3 SEVERE OBESITY DUE TO EXCESS CALORIES WITH SERIOUS COMORBIDITY AND BODY MASS INDEX (BMI) OF 60.0 TO 69.9 IN ADULT: ICD-10-CM

## 2024-12-16 DIAGNOSIS — E78.5 DYSLIPIDEMIA, GOAL LDL BELOW 100: ICD-10-CM

## 2024-12-16 DIAGNOSIS — Z91.148 NONCOMPLIANCE W/MEDICATION TREATMENT DUE TO INTERMIT USE OF MEDICATION: ICD-10-CM

## 2024-12-16 DIAGNOSIS — E66.01 MORBID OBESITY WITH BMI OF 60.0-69.9, ADULT: ICD-10-CM

## 2024-12-16 DIAGNOSIS — E66.01 CLASS 3 SEVERE OBESITY DUE TO EXCESS CALORIES WITH SERIOUS COMORBIDITY AND BODY MASS INDEX (BMI) OF 60.0 TO 69.9 IN ADULT: ICD-10-CM

## 2024-12-16 DIAGNOSIS — Z91.148 NONCOMPLIANCE WITH MEDICATIONS: ICD-10-CM

## 2024-12-16 DIAGNOSIS — E11.65 TYPE 2 DIABETES MELLITUS WITH HYPERGLYCEMIA, WITH LONG-TERM CURRENT USE OF INSULIN: Primary | ICD-10-CM

## 2024-12-16 DIAGNOSIS — G47.33 OSA (OBSTRUCTIVE SLEEP APNEA): ICD-10-CM

## 2024-12-16 DIAGNOSIS — Z79.4 TYPE 2 DIABETES MELLITUS WITH HYPERGLYCEMIA, WITH LONG-TERM CURRENT USE OF INSULIN: Primary | ICD-10-CM

## 2024-12-16 DIAGNOSIS — Z59.9 FINANCIAL DIFFICULTIES: ICD-10-CM

## 2024-12-16 DIAGNOSIS — Z91.199 NONCOMPLIANCE WITH DIABETES TREATMENT: ICD-10-CM

## 2024-12-16 DIAGNOSIS — Z79.4 TYPE 2 DIABETES MELLITUS WITH DIABETIC POLYNEUROPATHY, WITH LONG-TERM CURRENT USE OF INSULIN: ICD-10-CM

## 2024-12-16 DIAGNOSIS — E11.42 TYPE 2 DIABETES MELLITUS WITH DIABETIC POLYNEUROPATHY, WITH LONG-TERM CURRENT USE OF INSULIN: ICD-10-CM

## 2024-12-16 DIAGNOSIS — E55.9 VITAMIN D DEFICIENCY: ICD-10-CM

## 2024-12-16 DIAGNOSIS — Z21 HIV INFECTION, UNSPECIFIED SYMPTOM STATUS: ICD-10-CM

## 2024-12-16 DIAGNOSIS — I10 PRIMARY HYPERTENSION: ICD-10-CM

## 2024-12-16 DIAGNOSIS — R53.82 CHRONIC FATIGUE: ICD-10-CM

## 2024-12-16 PROCEDURE — 99214 OFFICE O/P EST MOD 30 MIN: CPT | Mod: 95,,, | Performed by: NURSE PRACTITIONER

## 2024-12-16 RX ORDER — ERGOCALCIFEROL 1.25 MG/1
50000 CAPSULE ORAL
Qty: 12 CAPSULE | Refills: 2 | Status: SHIPPED | OUTPATIENT
Start: 2024-12-16

## 2024-12-16 RX ORDER — ROSUVASTATIN CALCIUM 40 MG/1
40 TABLET, COATED ORAL NIGHTLY
Qty: 90 TABLET | Refills: 3 | Status: SHIPPED | OUTPATIENT
Start: 2024-12-16 | End: 2025-12-16

## 2024-12-16 RX ORDER — TIRZEPATIDE 7.5 MG/.5ML
7.5 INJECTION, SOLUTION SUBCUTANEOUS
Qty: 4 PEN | Refills: 0 | Status: SHIPPED | OUTPATIENT
Start: 2024-12-16

## 2024-12-16 RX ORDER — INSULIN PUMP SYRINGE, 3 ML
EACH MISCELLANEOUS
Qty: 1 EACH | Refills: 0 | Status: SHIPPED | OUTPATIENT
Start: 2024-12-16 | End: 2024-12-17 | Stop reason: SDUPTHER

## 2024-12-16 RX ORDER — TIRZEPATIDE 10 MG/.5ML
10 INJECTION, SOLUTION SUBCUTANEOUS
Qty: 4 PEN | Refills: 3 | Status: SHIPPED | OUTPATIENT
Start: 2024-12-16

## 2024-12-16 RX ORDER — METFORMIN HYDROCHLORIDE 1000 MG/1
1000 TABLET ORAL 2 TIMES DAILY WITH MEALS
Qty: 180 TABLET | Refills: 3 | Status: SHIPPED | OUTPATIENT
Start: 2024-12-16 | End: 2025-12-16

## 2024-12-16 RX ORDER — LANCETS
EACH MISCELLANEOUS
Qty: 150 EACH | Refills: 11 | Status: SHIPPED | OUTPATIENT
Start: 2024-12-16 | End: 2024-12-17 | Stop reason: SDUPTHER

## 2024-12-16 RX ORDER — TIRZEPATIDE 5 MG/.5ML
5 INJECTION, SOLUTION SUBCUTANEOUS
Qty: 4 PEN | Refills: 0 | Status: SHIPPED | OUTPATIENT
Start: 2024-12-16

## 2024-12-16 SDOH — SOCIAL DETERMINANTS OF HEALTH (SDOH): PROBLEM RELATED TO HOUSING AND ECONOMIC CIRCUMSTANCES, UNSPECIFIED: Z59.9

## 2024-12-16 NOTE — PROGRESS NOTES
The patient location is: [facility]   The chief complaint leading to consultation is: Diabetes management- follow up     Visit type: audiovisual    Face to Face time with patient: 20 minutes   30  minutes of total time spent on the encounter, which includes face to face time and non-face to face time preparing to see the patient (eg, review of tests), Obtaining and/or reviewing separately obtained history, Documenting clinical information in the electronic or other health record, Independently interpreting results (not separately reported) and communicating results to the patient/family/caregiver, or Care coordination (not separately reported).         Each patient to whom he or she provides medical services by telemedicine is:  (1) informed of the relationship between the physician and patient and the respective role of any other health care provider with respect to management of the patient; and (2) notified that he or she may decline to receive medical services by telemedicine and may withdraw from such care at any time.    Notes:       CC:   Chief Complaint   Patient presents with    Diabetes Mellitus       HPI: Mickey D Jeansonne is a 34 y.o. male presents for a follow-up visit today for the management of Diabetes     He was diagnosed with Type 2 diabetes in 2019 on routine labs when he had HA and fatigue.       Family hx of diabetes: mother and father ( )   Hospitalized for diabetes--yes  [facility] 2024  Insulin therapy--insulin was prescribed after [facility] in 2024      No personal or FH of thyroid cancer or personal of pancreatic cancer or pancreatitis.       Patient was recently hospitalized on 2024 for hyperosmolar hyperglycemic state  He was sent home on insulin therapy however he never picked up the insulin       Our last visit was a virtual visit in 2024  At that visit we continued metformin 1000 mg twice a day, continued Ozempic 1 mg weekly, started Jardiance 10 mg daily  Discussed  that it was okay for him to stay off of the Levemir or Tresiba for now and can use the correction scale as needed before meals for elevated blood sugars  At our last visit he had reported that he had never started the insulin at all and his A1c was down to 7.1%   The Dexcom was too expensive  We discussed that we could try the Luis A 3  We have titrated up on the Jardiance to 25 mg daily  His recent A1c is 7.9%-- up from 7.1% but down from 8.2%   On the jardiance- no  issues at all.   Still on ozempic 1 mg weekly -- denies GI upset   Still on metformin   Reports compliance with regimen   Not sure why his A1c increased?   Not able to get the luis a due to cost   Weight from 410 lbs to 402 lbs maybe? Not sure   Virtual visit and he is weighing himself       DIABETES COMPLICATIONS: peripheral neuropathy    Urine MAC + X 1     Diabetes Management Status    ASA:  No    Statin: Taking--Lipitor 80 mg-- sometimes missing doses   ACE/ARB: Taking--benazepril 20 mg     The ASCVD Risk score (Jerome ANDERSON, et al., 2019) failed to calculate for the following reasons:    The 2019 ASCVD risk score is only valid for ages 40 to 79      Screening or Prevention Patient's value Goal Complete/Controlled?   HgA1C Testing and Control   Lab Results   Component Value Date    HGBA1C 7.9 (H) 12/11/2024      Annually/Less than 8% No   Lipid profile : 12/11/2024 Annually Yes   LDL control Lab Results   Component Value Date    LDLCALC 142.4 12/11/2024    Annually/Less than 100 mg/dl  No   Nephropathy screening Lab Results   Component Value Date    LABMICR 8.0 12/11/2024     Lab Results   Component Value Date    PROTEINUA Negative 05/28/2024    Annually Yes   Blood pressure BP Readings from Last 1 Encounters:   08/20/24 138/70    Less than 140/90 No   Dilated retinal exam : 06/13/2024 Annually Yes   Foot exam   : 04/25/2024 Annually No       CURRENT A1C:    Hemoglobin A1C   Date Value Ref Range Status   12/11/2024 7.9 (H) 4.0 - 5.6 % Final      Comment:     ADA Screening Guidelines:  5.7-6.4%  Consistent with prediabetes  >or=6.5%  Consistent with diabetes    High levels of fetal hemoglobin interfere with the HbA1C  assay. Heterozygous hemoglobin variants (HbS, HgC, etc)do  not significantly interfere with this assay.   However, presence of multiple variants may affect accuracy.     11/08/2024 8.2 (H) 4.7 - 5.6 % Final   08/01/2024 7.1 (H) 4.0 - 5.6 % Final     Comment:     ADA Screening Guidelines:  5.7-6.4%  Consistent with prediabetes  >or=6.5%  Consistent with diabetes    High levels of fetal hemoglobin interfere with the HbA1C  assay. Heterozygous hemoglobin variants (HbS, HgC, etc)do  not significantly interfere with this assay.   However, presence of multiple variants may affect accuracy.     08/01/2024 7.1 (H) 4.0 - 5.6 % Final     Comment:     ADA Screening Guidelines:  5.7-6.4%  Consistent with prediabetes  >or=6.5%  Consistent with diabetes    High levels of fetal hemoglobin interfere with the HbA1C  assay. Heterozygous hemoglobin variants (HbS, HgC, etc)do  not significantly interfere with this assay.   However, presence of multiple variants may affect accuracy.         GOAL A1C: 6.5% without hypoglycemia      DM MEDICATIONS USED IN THE PAST:  NovoLog, metformin, Ozempic  Levemir   Tresiba   Dexocm - too expensive   Jardiance   Luis A - cost         CURRENT DIABETES MEDICATIONS:    Metformin 1000 mg 2 times per day   Ozempic 1 mg weekly on Fridays   Jardiance 25 mg daily     Insulin:N/A   Missed doses: denies         BLOOD GLUCOSE MONITORING:    Not checking his BG consistently   His mother has been checking it periodically -- using his mothers machine to check it sometimes   Unable to recall any readings for me to review         HYPOGLYCEMIA:  denies        MEALS: eating 2 meals per day   BF: skips   Lunch: baked meats, veggies, and fruits   Dinner: same as lunch   Snack: rarely reported   Drinks: water  Lots of juice before admission           CURRENT EXERCISE:  No        Review of Systems  Review of Systems   Constitutional:  Negative for appetite change, fatigue and unexpected weight change.   HENT:  Negative for trouble swallowing.    Eyes:  Negative for visual disturbance.   Respiratory:  Negative for shortness of breath.    Cardiovascular:  Positive for leg swelling. Negative for chest pain.   Gastrointestinal:  Negative for abdominal distention, abdominal pain, constipation, diarrhea, nausea and vomiting.   Endocrine: Negative for polydipsia, polyphagia and polyuria.   Genitourinary:         No Nocturia    Musculoskeletal:  Positive for arthralgias and joint swelling.        Gout flare ups    Skin:  Negative for wound.   Neurological:  Negative for numbness.       Physical Exam   Physical Exam  Constitutional:       General: He is not in acute distress.     Appearance: Normal appearance. He is not ill-appearing.   HENT:      Head: Normocephalic and atraumatic.      Nose: Nose normal.   Pulmonary:      Effort: Pulmonary effort is normal.   Neurological:      General: No focal deficit present.      Mental Status: He is alert and oriented to person, place, and time.   Psychiatric:         Mood and Affect: Mood normal.         Behavior: Behavior normal.         Thought Content: Thought content normal.         Judgment: Judgment normal.             FOOT EXAMINATION:  Deferred due to virtual visit          Lab Results   Component Value Date    TSH 1.110 05/28/2024           Type 2 diabetes mellitus with hyperglycemia, with long-term current use of insulin  Uncontrolled  A1c went back up to 8.2%---I am concerned about maybe some noncompliance issues??   He is now back down 7.9% which is still above goal  Not making sense since we added in the Jardiance the A1c should have improved---so that is what makes me think that there is some noncompliance  Has not lost much weight on the Ozempic and denies any GI upset  He is tolerating the Jardiance well  without any  complaints---urine MAC has improved  Will change Ozempic to Mounjaro to see if that will help with glycemic control and weight loss    Unfortunately he can not get CGM therapy due to cost---we have tried DME and pharmacy---we have tried Dexcom and Luis A  Discussed with patient if he is not on CGM therapy that he must start checking his blood sugars  I have reordered the glucometer    Urine MAC + X 1       -- Medication Changes:   Continue metformin 1000 mg 2 times per day     Continue Jardiance 25 mg daily  -- Please let us know if you have nausea, vomiting, or weakness with a normal BG. This could be euglycemic DKA.  -- please hold medication 3 days prior to surgery or if you are sick and have decreased intake.   -- Please drink lots of water daily while on this medication.   --This medication could also give you a yeast infection- please let us know if this occurs and we can treat it.   -- no keto diet       Stop Ozempic  Change to Mounjaro weekly  Start Mounjaro 5 mg weekly for 4 weeks and then increase to 7.5 mg weekly for 4 weeks and then increase to 10 mg weekly and stay there        -- Reviewed goals of therapy are to get the best control we can without hypoglycemia.  -- Reviewed patient's current insulin regimen. Clarified proper insulin dose and timing in relation to meals, etc. Insulin injection sites and proper rotation instructed.    -- Advised frequent self blood glucose monitoring.  Patient encouraged to document glucose results and bring them to every clinic visit.  Dexcom was too expensive through DME  Will try Luis A 3 through pharmacy--too expensive and not covered  Rx for new meter and supplies sent to pharmacy----he should be checking his blood sugars at least 1-2 times per day at alternating times  -- Hypoglycemia precautions discussed. Instructed on precautions before driving.    -- Call for Bg repeatedly < 70 or > 180.   -- Close adherence to lifestyle changes recommended.   --  Periodic follow ups for eye evaluations, foot care and dental care suggested.        Hypertension  BP goal is < 140/90.   Tolerating ACEi  Blood pressure goals discussed with patient  Encouraged patient to monitor blood pressure at home    HIV (human immunodeficiency virus infection)  Increases insulin resistance  Continue to follow with Infectious Disease    Class 3 severe obesity due to excess calories with serious comorbidity and body mass index (BMI) of 60.0 to 69.9 in adult  Body mass index is 61.12 kg/m².  Increases insulin resistance.   Discussed DM diet and exercise.       GASTON (obstructive sleep apnea)  If uncontrolled increases insulin resistance  Not using CPAP machine     Dyslipidemia, goal LDL below 100  On statin per ADA recommendations  LDL goal < 100.   Cholesterol above goal ---he does report missing some doses of his Lipitor 80 mg nightly  Discussed changing the Lipitor 80 mg nightly to Crestor 40 mg nightly  Work on compliance    Type 2 diabetes mellitus with diabetic polyneuropathy, with long-term current use of insulin  Optimize BG readings.   See above.   On gabapentin     Educated patient to check feet daily for any foreign objects and/or wounds. Discussed with patient the importance of wearing appropriate footwear at all times, not to walk barefoot ever, and to check shoes before putting them on feet. Instructed patient to keep feet dry by regularly changing shoes and socks and drying feet after baths and exercises. Also, instructed patient to report any new lesions, discolorations, or swelling to a healthcare professional.          His medications were sent to [facility] in [place] for him to get the mail to him to help with medication compliance and issues      I spent a total of 30 minutes on the day of the visit.This includes face to face time and non-face to face time preparing to see the patient (eg, review of tests), obtaining and/or reviewing separately obtained history,  documenting clinical information in the electronic or other health record, independently interpreting results and communicating results to the patient/family/caregiver, or care coordinator.          Follow up in about 3 months (around 3/16/2025).  Follow-up with me in 3 months with labs prior  Mounjaro PA please      Orders Placed This Encounter   Procedures    Hemoglobin A1C     Standing Status:   Future     Standing Expiration Date:   6/16/2026    Comprehensive Metabolic Panel     Standing Status:   Future     Standing Expiration Date:   6/16/2026    Lipid Panel     Standing Status:   Future     Standing Expiration Date:   6/16/2026    TSH     Standing Status:   Future     Standing Expiration Date:   6/16/2026    Vitamin D     Standing Status:   Future     Standing Expiration Date:   6/16/2026       Recommendations were discussed with the patient in detail  The patient verbalized understanding and agrees with the plan outlined as above.     This note was partly generated with [employer] voice recognition software. I apologize for any possible typographical errors.

## 2024-12-16 NOTE — ASSESSMENT & PLAN NOTE
Body mass index is 61.12 kg/m².  Increases insulin resistance.   Discussed DM diet and exercise.

## 2024-12-16 NOTE — ASSESSMENT & PLAN NOTE
On statin per ADA recommendations  LDL goal < 100.   Cholesterol above goal ---he does report missing some doses of his Lipitor 80 mg nightly  Discussed changing the Lipitor 80 mg nightly to Crestor 40 mg nightly  Work on compliance

## 2024-12-16 NOTE — ASSESSMENT & PLAN NOTE
Uncontrolled  A1c went back up to 8.2%---I am concerned about maybe some noncompliance issues??   He is now back down 7.9% which is still above goal  Not making sense since we added in the Jardiance the A1c should have improved---so that is what makes me think that there is some noncompliance  Has not lost much weight on the Ozempic and denies any GI upset  He is tolerating the Jardiance well without any  complaints---urine MAC has improved  Will change Ozempic to Mounjaro to see if that will help with glycemic control and weight loss    Unfortunately he can not get CGM therapy due to cost---we have tried DME and pharmacy---we have tried Dexcom and Luis A  Discussed with patient if he is not on CGM therapy that he must start checking his blood sugars  I have reordered the glucometer    Urine MAC + X 1       -- Medication Changes:   Continue metformin 1000 mg 2 times per day     Continue Jardiance 25 mg daily  -- Please let us know if you have nausea, vomiting, or weakness with a normal BG. This could be euglycemic DKA.  -- please hold medication 3 days prior to surgery or if you are sick and have decreased intake.   -- Please drink lots of water daily while on this medication.   --This medication could also give you a yeast infection- please let us know if this occurs and we can treat it.   -- no keto diet       Stop Ozempic  Change to Mounjaro weekly  Start Mounjaro 5 mg weekly for 4 weeks and then increase to 7.5 mg weekly for 4 weeks and then increase to 10 mg weekly and stay there        -- Reviewed goals of therapy are to get the best control we can without hypoglycemia.  -- Reviewed patient's current insulin regimen. Clarified proper insulin dose and timing in relation to meals, etc. Insulin injection sites and proper rotation instructed.    -- Advised frequent self blood glucose monitoring.  Patient encouraged to document glucose results and bring them to every clinic visit.  Dexcom was too expensive through  DME  Will try Luis A 3 through pharmacy--too expensive and not covered  Rx for new meter and supplies sent to pharmacy----he should be checking his blood sugars at least 1-2 times per day at alternating times  -- Hypoglycemia precautions discussed. Instructed on precautions before driving.    -- Call for Bg repeatedly < 70 or > 180.   -- Close adherence to lifestyle changes recommended.   -- Periodic follow ups for eye evaluations, foot care and dental care suggested.

## 2024-12-17 DIAGNOSIS — E11.65 TYPE 2 DIABETES MELLITUS WITH HYPERGLYCEMIA, WITH LONG-TERM CURRENT USE OF INSULIN: ICD-10-CM

## 2024-12-17 DIAGNOSIS — Z79.4 TYPE 2 DIABETES MELLITUS WITH HYPERGLYCEMIA, WITH LONG-TERM CURRENT USE OF INSULIN: ICD-10-CM

## 2024-12-17 RX ORDER — LANCETS
EACH MISCELLANEOUS
Qty: 150 EACH | Refills: 11 | Status: SHIPPED | OUTPATIENT
Start: 2024-12-17

## 2024-12-17 RX ORDER — INSULIN PUMP SYRINGE, 3 ML
EACH MISCELLANEOUS
Qty: 1 EACH | Refills: 0 | Status: SHIPPED | OUTPATIENT
Start: 2024-12-17

## 2024-12-17 NOTE — TELEPHONE ENCOUNTER
Care Due:                  Date            Visit Type   Department     Provider  --------------------------------------------------------------------------------                                [facility]  Last Visit: [facility]      FOLLOW UP    PRIMARY CARE   Santiago Chaparro                               -                              PRIMARY [facility]  Next Visit: 06-      CARE ([facility])   PRIMARY CARE   Santiago Chaparro                                                            Last  Test          Frequency    Reason                     Performed    Due Date  --------------------------------------------------------------------------------    Uric Acid...  12 months..  colchicine...............  Not Found    Overdue    Vitamin D...  12 months..  ergocalciferol...........  Not Found    Overdue    [employer] Embedded Care Due Messages. Reference number: [id number].   5/10/2024 2:31:32 PM CDT  
He will like you to send the Ozempic to [facility].  
declines

## 2024-12-18 ENCOUNTER — PATIENT MESSAGE (OUTPATIENT)
Dept: DIABETES | Facility: CLINIC | Age: 34
End: 2024-12-18
Payer: MEDICARE

## 2024-12-18 ENCOUNTER — PATIENT MESSAGE (OUTPATIENT)
Dept: PRIMARY CARE CLINIC | Facility: CLINIC | Age: 34
End: 2024-12-18
Payer: MEDICARE

## 2024-12-20 RX ORDER — AZITHROMYCIN 250 MG/1
TABLET, FILM COATED ORAL
Qty: 6 TABLET | Refills: 0 | Status: SHIPPED | OUTPATIENT
Start: 2024-12-20 | End: 2024-12-24

## 2024-12-20 RX ORDER — BENZONATATE 200 MG/1
200 CAPSULE ORAL 3 TIMES DAILY PRN
Qty: 30 CAPSULE | Refills: 0 | Status: SHIPPED | OUTPATIENT
Start: 2024-12-20 | End: 2024-12-30

## 2025-01-10 ENCOUNTER — TELEPHONE (OUTPATIENT)
Dept: DIABETES | Facility: CLINIC | Age: 35
End: 2025-01-10
Payer: MEDICARE

## 2025-01-15 DIAGNOSIS — Z79.4 TYPE 2 DIABETES MELLITUS WITH HYPERGLYCEMIA, WITH LONG-TERM CURRENT USE OF INSULIN: ICD-10-CM

## 2025-01-15 DIAGNOSIS — E11.65 TYPE 2 DIABETES MELLITUS WITH HYPERGLYCEMIA, WITH LONG-TERM CURRENT USE OF INSULIN: ICD-10-CM

## 2025-01-15 RX ORDER — TIRZEPATIDE 5 MG/.5ML
5 INJECTION, SOLUTION SUBCUTANEOUS
Qty: 4 PEN | Refills: 0 | OUTPATIENT
Start: 2025-01-15

## 2025-01-17 DIAGNOSIS — E11.65 TYPE 2 DIABETES MELLITUS WITH HYPERGLYCEMIA, WITH LONG-TERM CURRENT USE OF INSULIN: ICD-10-CM

## 2025-01-17 DIAGNOSIS — Z79.4 TYPE 2 DIABETES MELLITUS WITH HYPERGLYCEMIA, WITH LONG-TERM CURRENT USE OF INSULIN: ICD-10-CM

## 2025-01-17 RX ORDER — TIRZEPATIDE 5 MG/.5ML
5 INJECTION, SOLUTION SUBCUTANEOUS
Qty: 4 PEN | Refills: 1 | Status: SHIPPED | OUTPATIENT
Start: 2025-01-17 | End: 2025-01-24 | Stop reason: CLARIF

## 2025-02-02 DIAGNOSIS — I10 ESSENTIAL HYPERTENSION: ICD-10-CM

## 2025-02-02 NOTE — TELEPHONE ENCOUNTER
No care due was identified.  [facility] Embedded Care Due Messages. Reference number: [id number].   2/02/2025 10:32:22 AM CST

## 2025-02-03 RX ORDER — AMLODIPINE AND BENAZEPRIL HYDROCHLORIDE 5; 20 MG/1; MG/1
1 CAPSULE ORAL 2 TIMES DAILY
Qty: 180 CAPSULE | Refills: 1 | Status: SHIPPED | OUTPATIENT
Start: 2025-02-03

## 2025-02-03 NOTE — TELEPHONE ENCOUNTER
Refill Decision Note   Mickey Jeansonne  is requesting a refill authorization.  Brief Assessment and Rationale for Refill:  Approve     Medication Therapy Plan:        Comments:     Note composed:9:49 AM 02/03/2025

## 2025-02-22 DIAGNOSIS — Z00.00 ENCOUNTER FOR MEDICARE ANNUAL WELLNESS EXAM: ICD-10-CM

## 2025-03-13 ENCOUNTER — RESULTS FOLLOW-UP (OUTPATIENT)
Dept: DIABETES | Facility: CLINIC | Age: 35
End: 2025-03-13

## 2025-03-19 ENCOUNTER — PATIENT MESSAGE (OUTPATIENT)
Dept: DIABETES | Facility: CLINIC | Age: 35
End: 2025-03-19

## 2025-03-19 ENCOUNTER — OFFICE VISIT (OUTPATIENT)
Dept: DIABETES | Facility: CLINIC | Age: 35
End: 2025-03-19
Payer: MEDICARE

## 2025-03-19 ENCOUNTER — TELEPHONE (OUTPATIENT)
Dept: DIABETES | Facility: CLINIC | Age: 35
End: 2025-03-19

## 2025-03-19 VITALS — BODY MASS INDEX: 59.6 KG/M2 | WEIGHT: 315 LBS

## 2025-03-19 DIAGNOSIS — Z21 HIV INFECTION, UNSPECIFIED SYMPTOM STATUS: ICD-10-CM

## 2025-03-19 DIAGNOSIS — G47.33 OSA (OBSTRUCTIVE SLEEP APNEA): ICD-10-CM

## 2025-03-19 DIAGNOSIS — E78.5 DYSLIPIDEMIA, GOAL LDL BELOW 100: ICD-10-CM

## 2025-03-19 DIAGNOSIS — E11.42 TYPE 2 DIABETES MELLITUS WITH DIABETIC POLYNEUROPATHY, WITH LONG-TERM CURRENT USE OF INSULIN: ICD-10-CM

## 2025-03-19 DIAGNOSIS — Z79.4 TYPE 2 DIABETES MELLITUS WITH HYPERGLYCEMIA, WITH LONG-TERM CURRENT USE OF INSULIN: ICD-10-CM

## 2025-03-19 DIAGNOSIS — E11.65 TYPE 2 DIABETES MELLITUS WITH HYPERGLYCEMIA, WITH LONG-TERM CURRENT USE OF INSULIN: ICD-10-CM

## 2025-03-19 DIAGNOSIS — E55.9 VITAMIN D DEFICIENCY: ICD-10-CM

## 2025-03-19 DIAGNOSIS — E11.65 TYPE 2 DIABETES MELLITUS WITH HYPERGLYCEMIA, WITH LONG-TERM CURRENT USE OF INSULIN: Primary | ICD-10-CM

## 2025-03-19 DIAGNOSIS — E66.01 CLASS 3 SEVERE OBESITY DUE TO EXCESS CALORIES WITH SERIOUS COMORBIDITY AND BODY MASS INDEX (BMI) OF 50.0 TO 59.9 IN ADULT: ICD-10-CM

## 2025-03-19 DIAGNOSIS — Z79.4 TYPE 2 DIABETES MELLITUS WITH DIABETIC POLYNEUROPATHY, WITH LONG-TERM CURRENT USE OF INSULIN: ICD-10-CM

## 2025-03-19 DIAGNOSIS — Z59.9 FINANCIAL DIFFICULTIES: ICD-10-CM

## 2025-03-19 DIAGNOSIS — Z79.4 TYPE 2 DIABETES MELLITUS WITH HYPERGLYCEMIA, WITH LONG-TERM CURRENT USE OF INSULIN: Primary | ICD-10-CM

## 2025-03-19 DIAGNOSIS — Z71.9 HEALTH EDUCATION/COUNSELING: ICD-10-CM

## 2025-03-19 DIAGNOSIS — I10 PRIMARY HYPERTENSION: ICD-10-CM

## 2025-03-19 DIAGNOSIS — E66.813 CLASS 3 SEVERE OBESITY DUE TO EXCESS CALORIES WITH SERIOUS COMORBIDITY AND BODY MASS INDEX (BMI) OF 50.0 TO 59.9 IN ADULT: ICD-10-CM

## 2025-03-19 RX ORDER — TIRZEPATIDE 10 MG/.5ML
10 INJECTION, SOLUTION SUBCUTANEOUS
Qty: 4 PEN | Refills: 4 | Status: SHIPPED | OUTPATIENT
Start: 2025-03-19

## 2025-03-19 RX ORDER — ROSUVASTATIN CALCIUM 40 MG/1
40 TABLET, COATED ORAL NIGHTLY
Qty: 90 TABLET | Refills: 3 | Status: SHIPPED | OUTPATIENT
Start: 2025-03-19 | End: 2026-03-19

## 2025-03-19 RX ORDER — METFORMIN HYDROCHLORIDE 1000 MG/1
1000 TABLET ORAL 2 TIMES DAILY WITH MEALS
Qty: 180 TABLET | Refills: 3 | Status: SHIPPED | OUTPATIENT
Start: 2025-03-19 | End: 2026-03-19

## 2025-03-19 RX ORDER — DEXTROSE 4 G
TABLET,CHEWABLE ORAL
Qty: 1 EACH | Refills: 0 | Status: SHIPPED | OUTPATIENT
Start: 2025-03-19

## 2025-03-19 RX ORDER — LANCETS
EACH MISCELLANEOUS
Qty: 150 EACH | Refills: 11 | Status: SHIPPED | OUTPATIENT
Start: 2025-03-19

## 2025-03-19 RX ORDER — LANCETS
EACH MISCELLANEOUS
Qty: 150 EACH | Refills: 11 | Status: SHIPPED | OUTPATIENT
Start: 2025-03-19 | End: 2025-03-19 | Stop reason: SDUPTHER

## 2025-03-19 RX ORDER — ERGOCALCIFEROL 1.25 MG/1
50000 CAPSULE ORAL
Qty: 12 CAPSULE | Refills: 2 | Status: SHIPPED | OUTPATIENT
Start: 2025-03-19

## 2025-03-19 RX ORDER — DEXTROSE 4 G
TABLET,CHEWABLE ORAL
Qty: 1 EACH | Refills: 0 | Status: SHIPPED | OUTPATIENT
Start: 2025-03-19 | End: 2025-03-19 | Stop reason: SDUPTHER

## 2025-03-19 SDOH — SOCIAL DETERMINANTS OF HEALTH (SDOH): PROBLEM RELATED TO HOUSING AND ECONOMIC CIRCUMSTANCES, UNSPECIFIED: Z59.9

## 2025-03-19 NOTE — TELEPHONE ENCOUNTER
----- Message from Oralia Lees NP sent at 3/19/2025 12:14 PM CDT -----  Can we call the pharmacy to see why he never received the glucometer. It was sent back in December 2024

## 2025-03-19 NOTE — ASSESSMENT & PLAN NOTE
Body mass index is 59.6 kg/m².  Increases insulin resistance.   Discussed DM diet and exercise.

## 2025-03-19 NOTE — ASSESSMENT & PLAN NOTE
On statin per ADA recommendations  LDL goal < 100.   Cholesterol above goal ---  His cholesterol has improved on the Crestor 40 mg nightly  Will check again at next visit  If remains above goal will add in Zetia to the Crestor 40 mg

## 2025-03-19 NOTE — TELEPHONE ENCOUNTER
----- Message from Oralia Lees NP sent at 3/19/2025  2:10 PM CDT -----  Regarding: FW:  Ok. Lets just make sure he gets the one I sent to [place]  ----- Message -----  From: Key Vang MA  Sent: 3/19/2025   2:06 PM CDT  To: Oralia Lees NP    Spoke to pharmacy and they said that they are not really sure why he did not receive it. When I asked for a specific reason she said that once it's cancelled they cannot see the reason why anymore.  ----- Message -----  From: Oralia Lees NP  Sent: 3/19/2025  12:14 PM CDT  To: Yoana Barton Staff    Can we call the pharmacy to see why he never received the glucometer. It was sent back in December 2024

## 2025-03-19 NOTE — PROGRESS NOTES
The patient location is: [facility]   The chief complaint leading to consultation is: Diabetes management- follow up     Visit type: audiovisual    Face to Face time with patient: 20 minutes   30  minutes of total time spent on the encounter, which includes face to face time and non-face to face time preparing to see the patient (eg, review of tests), Obtaining and/or reviewing separately obtained history, Documenting clinical information in the electronic or other health record, Independently interpreting results (not separately reported) and communicating results to the patient/family/caregiver, or Care coordination (not separately reported).         Each patient to whom he or she provides medical services by telemedicine is:  (1) informed of the relationship between the physician and patient and the respective role of any other health care provider with respect to management of the patient; and (2) notified that he or she may decline to receive medical services by telemedicine and may withdraw from such care at any time.    Notes:       CC:   Chief Complaint   Patient presents with    Diabetes Mellitus       HPI: Mickey D Jeansonne is a 34 y.o. male presents for a follow-up visit today for the management of Diabetes     He was diagnosed with Type 2 diabetes in 2019 on routine labs when he had HA and fatigue.       Family hx of diabetes: mother and father ( )   Hospitalized for diabetes--yes  [facility] 2024  Insulin therapy--insulin was prescribed after HHS in 2024      No personal or FH of thyroid cancer or personal of pancreatic cancer or pancreatitis.       Patient was recently hospitalized on 2024 for hyperosmolar hyperglycemic state  He was sent home on insulin therapy however he never picked up the insulin         Our last visit was a virtual visit in 2024  At that visit we:     Continued metformin 1000 mg 2 times per day     Continued Jardiance 25 mg daily  -- Please let us know if you  have nausea, vomiting, or weakness with a normal BG. This could be euglycemic DKA.  -- please hold medication 3 days prior to surgery or if you are sick and have decreased intake.   -- Please drink lots of water daily while on this medication.   --This medication could also give you a yeast infection- please let us know if this occurs and we can treat it.   -- no keto diet         Stopped Ozempic  Change to Mounjaro weekly  Start Mounjaro 5 mg weekly for 4 weeks and then increase to 7.5 mg weekly for 4 weeks and then increase to 10 mg weekly and stay there     Dexcom was too expensive     A1c has improved from 7.9% to 6.8%   Now on Mounjaro 10 mg weekly dose --just started this dose about 2 weeks ago---previously on the 7.5 mg weekly dose  Denies GI upset on the Mounjaro - no issues at all   He is liking the Mounjaro more than the ozempic     He is not checking his blood sugar.  Reports that he never received a glucometer from the pharmacy that was sent back in December at our last visit      Weight from 402 lbs to 392 lbs       DIABETES COMPLICATIONS: peripheral neuropathy    Urine MAC + X 1     Diabetes Management Status    ASA:  No    Statin: Taking--Crestor 40 mg nightly   ACE/ARB: Taking--benazepril 20 mg     The ASCVD Risk score (Jerome ANDERSON, et al., 2019) failed to calculate for the following reasons:    The 2019 ASCVD risk score is only valid for ages 40 to 79      Screening or Prevention Patient's value Goal Complete/Controlled?   HgA1C Testing and Control   Lab Results   Component Value Date    HGBA1C 6.8 (H) 03/12/2025      Annually/Less than 8% No   Lipid profile : 03/12/2025 Annually Yes   LDL control Lab Results   Component Value Date    LDLCALC 128.8 03/12/2025    Annually/Less than 100 mg/dl  No   Nephropathy screening Lab Results   Component Value Date    LABMICR 8.0 12/11/2024     Lab Results   Component Value Date    PROTEINUA Negative 05/28/2024    Annually Yes   Blood pressure BP Readings from  Last 1 Encounters:   08/20/24 138/70    Less than 140/90 No   Dilated retinal exam : 06/13/2024 Annually Yes   Foot exam   : 04/25/2024 Annually No       CURRENT A1C:    Hemoglobin A1C   Date Value Ref Range Status   03/12/2025 6.8 (H) 4.0 - 5.6 % Final     Comment:     ADA Screening Guidelines:  5.7-6.4%  Consistent with prediabetes  >or=6.5%  Consistent with diabetes    High levels of fetal hemoglobin interfere with the HbA1C  assay. Heterozygous hemoglobin variants (HbS, HgC, etc)do  not significantly interfere with this assay.   However, presence of multiple variants may affect accuracy.     12/11/2024 7.9 (H) 4.0 - 5.6 % Final     Comment:     ADA Screening Guidelines:  5.7-6.4%  Consistent with prediabetes  >or=6.5%  Consistent with diabetes    High levels of fetal hemoglobin interfere with the HbA1C  assay. Heterozygous hemoglobin variants (HbS, HgC, etc)do  not significantly interfere with this assay.   However, presence of multiple variants may affect accuracy.     11/08/2024 8.2 (H) 4.7 - 5.6 % Final   08/01/2024 7.1 (H) 4.0 - 5.6 % Final     Comment:     ADA Screening Guidelines:  5.7-6.4%  Consistent with prediabetes  >or=6.5%  Consistent with diabetes    High levels of fetal hemoglobin interfere with the HbA1C  assay. Heterozygous hemoglobin variants (HbS, HgC, etc)do  not significantly interfere with this assay.   However, presence of multiple variants may affect accuracy.     08/01/2024 7.1 (H) 4.0 - 5.6 % Final     Comment:     ADA Screening Guidelines:  5.7-6.4%  Consistent with prediabetes  >or=6.5%  Consistent with diabetes    High levels of fetal hemoglobin interfere with the HbA1C  assay. Heterozygous hemoglobin variants (HbS, HgC, etc)do  not significantly interfere with this assay.   However, presence of multiple variants may affect accuracy.         GOAL A1C: 6.5% without hypoglycemia      DM MEDICATIONS USED IN THE PAST:  NovoLog, metformin, Ozempic  Levemir   Tresiba   Dexocm - too  expensive   Jardiance   Luis A - cost   Mounjaro         CURRENT DIABETES MEDICATIONS:    Metformin 1000 mg 2 times per day   Mounjaro 10 mg weekly on Saturdays -- he jsut started this dose about 2 weeks ago   Jardiance 25 mg daily     Insulin:N/A   Missed doses: denies         BLOOD GLUCOSE MONITORING:    Not checking his BG consistently       HYPOGLYCEMIA:  denies        MEALS: eating 2 meals per day   BF: skips   Lunch: baked meats, veggies, and fruits   Dinner: same as lunch   Snack: rarely reported   Drinks: water  Lots of juice before admission          CURRENT EXERCISE:  PT 2 times per week         Review of Systems  Review of Systems   Constitutional:  Positive for fatigue. Negative for appetite change and unexpected weight change.   HENT:  Negative for trouble swallowing.    Eyes:  Negative for visual disturbance.   Respiratory:  Negative for shortness of breath.    Cardiovascular:  Positive for leg swelling. Negative for chest pain.   Gastrointestinal:  Negative for abdominal distention, abdominal pain, constipation, diarrhea, nausea and vomiting.   Endocrine: Negative for polydipsia, polyphagia and polyuria.   Genitourinary:         No Nocturia    Musculoskeletal:  Positive for arthralgias and joint swelling (right wrist injury/ pain).        Gout flare ups    Skin:  Negative for wound.   Neurological:  Negative for numbness.       Physical Exam   Physical Exam  Constitutional:       General: He is not in acute distress.     Appearance: Normal appearance. He is obese. He is not ill-appearing.   HENT:      Head: Normocephalic and atraumatic.      Nose: Nose normal.   Pulmonary:      Effort: Pulmonary effort is normal.   Neurological:      General: No focal deficit present.      Mental Status: He is alert and oriented to person, place, and time.   Psychiatric:         Mood and Affect: Mood normal.         Behavior: Behavior normal.         Thought Content: Thought content normal.         Judgment: Judgment  normal.             FOOT EXAMINATION:  Deferred due to virtual visit          Lab Results   Component Value Date    TSH 1.377 03/12/2025           Type 2 diabetes mellitus with hyperglycemia, with long-term current use of insulin  A1c has improved to 6.8%  Would like to get him closer to 6-6.5% if possible due to age  He just increased his Mounjaro to 10 mg weekly  Will give him a couple of months on this dose before recommending that we increase  He is having some weight loss on this medication  Reports things are better with getting the medications from [facility]   He still has not received a glucometer---will ask staff to check on this        Unfortunately he can not get CGM therapy due to cost---we have tried DME and pharmacy---we have tried Dexcom and Luis A  Discussed with patient if he is not on CGM therapy that he must start checking his blood sugars  I have reordered the glucometer    Urine MAC + X 1 --has improved      -- Medication Changes:   Continue metformin 1000 mg 2 times per day     Continue Jardiance 25 mg daily  -- Please let us know if you have nausea, vomiting, or weakness with a normal BG. This could be euglycemic DKA.  -- please hold medication 3 days prior to surgery or if you are sick and have decreased intake.   -- Please drink lots of water daily while on this medication.   --This medication could also give you a yeast infection- please let us know if this occurs and we can treat it.   -- no keto diet       Continue Mounjaro 10 mg weekly---just increased to this dose 2 weeks ago  Will stay on it for a couple of months  Discussed at next visit we may increase to 12.5 mg weekly if needed  Encouraged diet and weight loss and exercise      -- Reviewed goals of therapy are to get the best control we can without hypoglycemia.  -- Reviewed patient's current insulin regimen. Clarified proper insulin dose and timing in relation to meals, etc. Insulin injection sites and proper rotation  instructed.    -- Advised frequent self blood glucose monitoring.  Patient encouraged to document glucose results and bring them to every clinic visit.  Dexcom was too expensive through DME  Will try Luis A 3 through pharmacy--too expensive and not covered  Rx for new meter and supplies sent to pharmacy----he should be checking his blood sugars at least 1 time per day at alternating times---will ask staff to check on the glucometer---sent a glucometer to [facility]  -- Hypoglycemia precautions discussed. Instructed on precautions before driving.    -- Call for Bg repeatedly < 70 or > 180.   -- Close adherence to lifestyle changes recommended.   -- Periodic follow ups for eye evaluations, foot care and dental care suggested.        Hypertension  BP goal is < 140/90.   Tolerating ACEi  Blood pressure goals discussed with patient  Encouraged patient to monitor blood pressure at home    HIV (human immunodeficiency virus infection)  Increases insulin resistance  Continue to follow with Infectious Disease    Class 3 severe obesity due to excess calories with serious comorbidity and body mass index (BMI) of 50.0 to 59.9 in adult  Body mass index is 59.6 kg/m².  Increases insulin resistance.   Discussed DM diet and exercise.       GASTON (obstructive sleep apnea)  If uncontrolled increases insulin resistance  Not using CPAP machine     Dyslipidemia, goal LDL below 100  On statin per ADA recommendations  LDL goal < 100.   Cholesterol above goal ---  His cholesterol has improved on the Crestor 40 mg nightly  Will check again at next visit  If remains above goal will add in Zetia to the Crestor 40 mg    Type 2 diabetes mellitus with diabetic polyneuropathy, with long-term current use of insulin  Optimize BG readings.   See above.   On gabapentin     Educated patient to check feet daily for any foreign objects and/or wounds. Discussed with patient the importance of wearing appropriate footwear at all times, not to walk barefoot ever,  and to check shoes before putting them on feet. Instructed patient to keep feet dry by regularly changing shoes and socks and drying feet after baths and exercises. Also, instructed patient to report any new lesions, discolorations, or swelling to a healthcare professional.      Vitamin D deficiency  Continue ergo weekly         Does have some financial constraints which limits treatment options      His medications were sent to [facility] in [place] for him to get the mail to him to help with medication compliance and issues        I spent a total of 30 minutes on the day of the visit.This includes face to face time and non-face to face time preparing to see the patient (eg, review of tests), obtaining and/or reviewing separately obtained history, documenting clinical information in the electronic or other health record, independently interpreting results and communicating results to the patient/family/caregiver, or care coordinator.          Follow up in about 4 months (around 7/19/2025).  Follow up with me in 4 months with labs prior         Orders Placed This Encounter   Procedures    Hemoglobin A1C     Standing Status:   Future     Expected Date:   6/19/2025     Expiration Date:   9/19/2026    Comprehensive Metabolic Panel     Standing Status:   Future     Expected Date:   6/19/2025     Expiration Date:   9/19/2026    Lipid Panel     Standing Status:   Future     Expected Date:   6/19/2025     Expiration Date:   9/19/2026    Vitamin D     Standing Status:   Future     Expected Date:   6/19/2025     Expiration Date:   9/19/2026    CBC Auto Differential     Standing Status:   Future     Expected Date:   6/19/2025     Expiration Date:   9/19/2026       Recommendations were discussed with the patient in detail  The patient verbalized understanding and agrees with the plan outlined as above.     This note was partly generated with [employer] voice recognition software. I apologize for any possible typographical  errors.

## 2025-03-19 NOTE — Clinical Note
Can we call the pharmacy to see why he never received the glucometer. It was sent back in December 2024

## 2025-03-19 NOTE — ASSESSMENT & PLAN NOTE
A1c has improved to 6.8%  Would like to get him closer to 6-6.5% if possible due to age  He just increased his Mounjaro to 10 mg weekly  Will give him a couple of months on this dose before recommending that we increase  He is having some weight loss on this medication  Reports things are better with getting the medications from [facility]   He still has not received a glucometer---will ask staff to check on this        Unfortunately he can not get CGM therapy due to cost---we have tried DME and pharmacy---we have tried Dexcom and Luis A  Discussed with patient if he is not on CGM therapy that he must start checking his blood sugars  I have reordered the glucometer    Urine MAC + X 1 --has improved      -- Medication Changes:   Continue metformin 1000 mg 2 times per day     Continue Jardiance 25 mg daily  -- Please let us know if you have nausea, vomiting, or weakness with a normal BG. This could be euglycemic DKA.  -- please hold medication 3 days prior to surgery or if you are sick and have decreased intake.   -- Please drink lots of water daily while on this medication.   --This medication could also give you a yeast infection- please let us know if this occurs and we can treat it.   -- no keto diet       Continue Mounjaro 10 mg weekly---just increased to this dose 2 weeks ago  Will stay on it for a couple of months  Discussed at next visit we may increase to 12.5 mg weekly if needed  Encouraged diet and weight loss and exercise      -- Reviewed goals of therapy are to get the best control we can without hypoglycemia.  -- Reviewed patient's current insulin regimen. Clarified proper insulin dose and timing in relation to meals, etc. Insulin injection sites and proper rotation instructed.    -- Advised frequent self blood glucose monitoring.  Patient encouraged to document glucose results and bring them to every clinic visit.  Dexcom was too expensive through DME  Will try Luis A 3 through pharmacy--too  expensive and not covered  Rx for new meter and supplies sent to pharmacy----he should be checking his blood sugars at least 1 time per day at alternating times---will ask staff to check on the glucometer---sent a glucometer to [facility]  -- Hypoglycemia precautions discussed. Instructed on precautions before driving.    -- Call for Bg repeatedly < 70 or > 180.   -- Close adherence to lifestyle changes recommended.   -- Periodic follow ups for eye evaluations, foot care and dental care suggested.

## 2025-03-20 ENCOUNTER — TELEPHONE (OUTPATIENT)
Dept: DIABETES | Facility: CLINIC | Age: 35
End: 2025-03-20
Payer: MEDICARE

## 2025-03-20 NOTE — TELEPHONE ENCOUNTER
----- Message from Med Assistant Mackey sent at 3/19/2025  3:29 PM CDT -----  Regarding: RE:  [facility] is saying that it is not covered thru them and that we should try sending it to a [facility]. Waiting for pt to tell me which one he wants it sent to.  ----- Message -----  From: Oralia Lees NP  Sent: 3/19/2025   2:10 PM CDT  To: Yoana Barton Staff  Subject: FW:                                              Ok. Lets just make sure he gets the one I sent to [place]  ----- Message -----  From: Key Vang MA  Sent: 3/19/2025   2:06 PM CDT  To: Oralia Lees NP    Spoke to pharmacy and they said that they are not really sure why he did not receive it. When I asked for a specific reason she said that once it's cancelled they cannot see the reason why anymore.  ----- Message -----  From: Oralia Lees NP  Sent: 3/19/2025  12:14 PM CDT  To: Yoana Barton Staff    Can we call the pharmacy to see why he never received the glucometer. It was sent back in December 2024

## 2025-04-10 ENCOUNTER — PATIENT MESSAGE (OUTPATIENT)
Dept: PRIMARY CARE CLINIC | Facility: CLINIC | Age: 35
End: 2025-04-10
Payer: MEDICARE

## 2025-04-15 ENCOUNTER — PATIENT MESSAGE (OUTPATIENT)
Dept: DIABETES | Facility: CLINIC | Age: 35
End: 2025-04-15
Payer: MEDICARE

## 2025-04-15 DIAGNOSIS — E55.9 VITAMIN D DEFICIENCY: ICD-10-CM

## 2025-04-16 RX ORDER — ERGOCALCIFEROL 1.25 MG/1
50000 CAPSULE ORAL
Qty: 12 CAPSULE | Refills: 2 | Status: SHIPPED | OUTPATIENT
Start: 2025-04-16

## 2025-04-21 ENCOUNTER — PATIENT MESSAGE (OUTPATIENT)
Dept: DIABETES | Facility: CLINIC | Age: 35
End: 2025-04-21
Payer: MEDICARE

## 2025-04-21 DIAGNOSIS — E11.65 TYPE 2 DIABETES MELLITUS WITH HYPERGLYCEMIA, WITH LONG-TERM CURRENT USE OF INSULIN: ICD-10-CM

## 2025-04-21 DIAGNOSIS — Z79.4 TYPE 2 DIABETES MELLITUS WITH HYPERGLYCEMIA, WITH LONG-TERM CURRENT USE OF INSULIN: ICD-10-CM

## 2025-04-21 DIAGNOSIS — E11.42 TYPE 2 DIABETES MELLITUS WITH DIABETIC POLYNEUROPATHY, WITH LONG-TERM CURRENT USE OF INSULIN: ICD-10-CM

## 2025-04-21 DIAGNOSIS — Z79.4 TYPE 2 DIABETES MELLITUS WITH DIABETIC POLYNEUROPATHY, WITH LONG-TERM CURRENT USE OF INSULIN: ICD-10-CM

## 2025-04-24 ENCOUNTER — TELEPHONE (OUTPATIENT)
Dept: PRIMARY CARE CLINIC | Facility: CLINIC | Age: 35
End: 2025-04-24
Payer: MEDICARE

## 2025-04-24 DIAGNOSIS — R60.9 SWOLLEN: Primary | ICD-10-CM

## 2025-04-24 NOTE — TELEPHONE ENCOUNTER
----- Message from Amanda sent at 4/24/2025 12:10 PM CDT -----  Contact: [phone]  Patient is returning a phone call.Who left a message for the patient: MistyDoes patient know what this is regarding:  missed callWould you like a call back, or a response through your [facility] portal?:   callComments:

## 2025-04-24 NOTE — TELEPHONE ENCOUNTER
----- Message from Kenzie sent at 4/24/2025 11:09 AM CDT -----  Contact: Mobile  Would you like a call back, or a response through your [facility] portal?:   CallComments: Patient said that the transportation services cancelled his ride for his appointment on today, and he would like to know if you will send someone else out?

## 2025-04-27 ENCOUNTER — RESULTS FOLLOW-UP (OUTPATIENT)
Dept: PRIMARY CARE CLINIC | Facility: CLINIC | Age: 35
End: 2025-04-27

## 2025-04-29 ENCOUNTER — OFFICE VISIT (OUTPATIENT)
Dept: PRIMARY CARE CLINIC | Facility: CLINIC | Age: 35
End: 2025-04-29
Payer: MEDICARE

## 2025-04-29 VITALS
RESPIRATION RATE: 18 BRPM | HEIGHT: 68 IN | SYSTOLIC BLOOD PRESSURE: 152 MMHG | OXYGEN SATURATION: 96 % | DIASTOLIC BLOOD PRESSURE: 98 MMHG | HEART RATE: 84 BPM | BODY MASS INDEX: 47.74 KG/M2 | WEIGHT: 315 LBS

## 2025-04-29 DIAGNOSIS — M25.532 LEFT WRIST PAIN: ICD-10-CM

## 2025-04-29 DIAGNOSIS — F51.01 PRIMARY INSOMNIA: ICD-10-CM

## 2025-04-29 DIAGNOSIS — F32.1 CURRENT MODERATE EPISODE OF MAJOR DEPRESSIVE DISORDER, UNSPECIFIED WHETHER RECURRENT: ICD-10-CM

## 2025-04-29 DIAGNOSIS — I10 PRIMARY HYPERTENSION: ICD-10-CM

## 2025-04-29 DIAGNOSIS — Z79.4 TYPE 2 DIABETES MELLITUS WITH DIABETIC POLYNEUROPATHY, WITH LONG-TERM CURRENT USE OF INSULIN: ICD-10-CM

## 2025-04-29 DIAGNOSIS — M10.9 GOUTY ARTHRITIS OF HAND: Primary | ICD-10-CM

## 2025-04-29 DIAGNOSIS — I10 ESSENTIAL HYPERTENSION: ICD-10-CM

## 2025-04-29 DIAGNOSIS — E66.01 MORBID OBESITY WITH BMI OF 60.0-69.9, ADULT: ICD-10-CM

## 2025-04-29 DIAGNOSIS — E11.42 TYPE 2 DIABETES MELLITUS WITH DIABETIC POLYNEUROPATHY, WITH LONG-TERM CURRENT USE OF INSULIN: ICD-10-CM

## 2025-04-29 DIAGNOSIS — L85.3 DRY SKIN: ICD-10-CM

## 2025-04-29 DIAGNOSIS — L21.0 SEBORRHEA CAPITIS: ICD-10-CM

## 2025-04-29 DIAGNOSIS — E11.9 ENCOUNTER FOR DIABETIC FOOT EXAM: ICD-10-CM

## 2025-04-29 PROCEDURE — 99215 OFFICE O/P EST HI 40 MIN: CPT | Mod: PBBFAC,PN | Performed by: INTERNAL MEDICINE

## 2025-04-29 PROCEDURE — 99999 PR PBB SHADOW E&M-EST. PATIENT-LVL V: CPT | Mod: PBBFAC,,, | Performed by: INTERNAL MEDICINE

## 2025-04-29 RX ORDER — KETOCONAZOLE 20 MG/ML
SHAMPOO, SUSPENSION TOPICAL
Qty: 120 ML | Refills: 5 | Status: SHIPPED | OUTPATIENT
Start: 2025-05-01

## 2025-04-29 RX ORDER — INDOMETHACIN 75 MG/1
75 CAPSULE, EXTENDED RELEASE ORAL 2 TIMES DAILY PRN
Qty: 40 CAPSULE | Refills: 0 | Status: SHIPPED | OUTPATIENT
Start: 2025-04-29

## 2025-04-29 RX ORDER — AMMONIUM LACTATE 12 G/100G
CREAM TOPICAL
Qty: 140 G | Refills: 3 | Status: SHIPPED | OUTPATIENT
Start: 2025-04-29

## 2025-04-29 RX ORDER — AMLODIPINE AND BENAZEPRIL HYDROCHLORIDE 10; 40 MG/1; MG/1
1 CAPSULE ORAL DAILY
Qty: 90 CAPSULE | Refills: 3 | Status: SHIPPED | OUTPATIENT
Start: 2025-04-29 | End: 2026-04-29

## 2025-04-29 RX ORDER — TRAZODONE HYDROCHLORIDE 100 MG/1
100-200 TABLET ORAL NIGHTLY PRN
Qty: 90 TABLET | Refills: 3 | Status: SHIPPED | OUTPATIENT
Start: 2025-04-29

## 2025-04-29 NOTE — PROGRESS NOTES
Subjective:       Patient ID: Mickey D Jeansonne is a 34 y.o. male.    Chief Complaint: Follow-up (4 month) and Medication Refill    HPI  History of Present Illness    CHIEF COMPLAINT:  Gold presents today with pain in both wrists    BILATERAL WRIST PAIN:  Right wrist pain began following tissue tear in January. MRI showed inflammation, tendon tear, and degenerative ligament changes. He has been receiving physical therapy since February 25th with reported improvement. Left wrist pain started 1.5 weeks ago, presenting with morning redness and swelling without known injury or trauma. X-ray showed evidence of swelling.    HYPERTENSION:  He takes medication for hypertension and reports frequent severe headaches associated with elevated blood pressure.    DEPRESSION:  He experiences fluctuating mood with episodes of feeling down, low energy levels, and lack of motivation. Previously established care with a psychiatrist who has since left the practice and has not been connected with a new provider.      ROS:  General: -fever, -chills, +fatigue, -weight gain, -weight loss, +decreased energy levels  Eyes: -vision changes, -redness, -discharge  ENT: -ear pain, -nasal congestion, -sore throat  Cardiovascular: -chest pain, -palpitations, -lower extremity edema  Respiratory: -cough, -shortness of breath  Gastrointestinal: -abdominal pain, -nausea, -vomiting, -diarrhea, -constipation, -blood in stool  Genitourinary: -dysuria, -hematuria, -frequency  Musculoskeletal: -joint pain, -muscle pain, +limb pain, +joint swelling, +limb swelling, +upper extremity swelling  Skin: -rash, -lesion, +dry skin  Neurological: +headache, -dizziness, -numbness, -tingling  Psychiatric: -anxiety, +depression, -sleep difficulty       Review of Systems    Objective:      Physical Exam  Cardiovascular:      Pulses:           Dorsalis pedis pulses are 2+ on the right side and 2+ on the left side.        Posterior tibial pulses are 2+ on the right side  and 2+ on the left side.   Feet:      Right foot:      Protective Sensation: 4 sites tested.  4 sites sensed.      Skin integrity: Dry skin present.      Toenail Condition: Right toenails are normal.      Left foot:      Protective Sensation: 4 sites tested.  4 sites sensed.      Skin integrity: Dry skin present.      Toenail Condition: Left toenails are normal.       Physical Exam    General: No acute distress. Well-developed. Well-nourished.  Eyes: EOMI. Sclerae anicteric.  HENT: Normocephalic. Atraumatic. Nares patent. Moist oral mucosa.  Ears: Bilateral TMs clear. Bilateral EACs clear.  Cardiovascular: Regular rate. Regular rhythm. No murmurs. No rubs. No gallops. Normal S1, S2.  Respiratory: Normal respiratory effort. Clear to auscultation bilaterally. No rales. No rhonchi. No wheezing.  Abdomen: Soft. Non-tender. Non-distended. Normoactive bowel sounds.  Musculoskeletal: No  obvious deformity.  Extremities: No lower extremity edema.  Neurological: Alert & oriented x3. No slurred speech. Normal gait.  Psychiatric: Normal mood. Normal affect. Good insight. Good judgment.  Skin: Warm. Dry skin. No rash.  MSK: Hand/Wrist - Left: Swelling in left wrist.           Assessment:       1. Primary hypertension    2. Left wrist pain    3. Type 2 diabetes mellitus with diabetic polyneuropathy, with long-term current use of insulin    4. Gouty arthritis of hand    5. Primary insomnia    6. Seborrhea capitis    7. Dry skin    8. Morbid obesity with BMI of 60.0-69.9, adult    9. Current moderate episode of major depressive disorder, unspecified whether recurrent    10. Encounter for diabetic foot exam        Plan:       Primary hypertension  Comments:  not well controlled will increase dosage of lotrel and recheck in  2weeks    Left wrist pain  -     WRIST BRACE FOR HOME USE  -     Sedimentation rate; Future; Expected date: 04/29/2025  -     Uric Acid; Future; Expected date: 04/29/2025  -     Rheumatoid Factor; Future;  Expected date: 04/29/2025    Type 2 diabetes mellitus with diabetic polyneuropathy, with long-term current use of insulin  -     Foot Exam Performed    Gouty arthritis of hand  -     indomethacin (INDOCIN SR) 75 mg CpSR CR capsule; Take 1 capsule (75 mg total) by mouth 2 (two) times daily as needed (gout attack). 1 po BID for gout  Dispense: 40 capsule; Refill: 0    Primary insomnia  -     traZODone (DESYREL) 100 MG tablet; Take 1-2 tablets (100-200 mg total) by mouth nightly as needed for Insomnia or Depression.  Dispense: 90 tablet; Refill: 3    Seborrhea capitis  -     ketoconazole (NIZORAL) 2 % shampoo; Apply topically twice a week.  Dispense: 120 mL; Refill: 5    Dry skin  Comments:  inthe feet  Orders:  -     ammonium lactate 12 % Crea; Apply to dry skin once or twice after bath  Dispense: 140 g; Refill: 3    Morbid obesity with BMI of 60.0-69.9, adult    Current moderate episode of major depressive disorder, unspecified whether recurrent    Encounter for diabetic foot exam      Assessment & Plan    M25.531 Pain in right wrist  M25.532 Pain in left wrist  M67.941 Unspecified disorder of synovium and tendon, right hand  M25.432 Effusion, left wrist  I10 Essential (primary) hypertension  R51.9 Headache, unspecified  F32.9 Major depressive disorder, single episode, unspecified  L85.3 Xerosis cutis    IMPRESSION:  - Assessed left hand and wrist pain, noting swelling and potential inflammation.  - Reviewed XR Right Wrist results showing tendon tear and degenerative changes.  - Considered possibility of gout arthritis in left wrist based on symptoms.  - Initiated conservative treatment with wrist brace and medication before considering further imaging.  - Evaluated depression symptoms and current medication regimen.  - Determined need for psychiatric consultation to reassess depression treatment.  - Noted dry skin condition, treatable with topical moisturizer.  - Considered adjusting BP medication pending re-check  of BP.    PAIN IN RIGHT WRIST:  - X-ray reveals inflammation in the tendon, tear in the annular tendon, and degenerative changes to ligament.  - Reviewed results with the patient.  - Gold is undergoing therapy since February 25th and showing improvement.  - Recommend continuing therapy instead of surgical intervention.  - Will provide a wrist brace and medication to manage symptoms.  - Explained benefits of physical therapy for healing tendon and ligament injuries.    PAIN IN LEFT WRIST AND HAND:  - Gold reports waking up with pain and swelling in the left hand 1.5 weeks ago.  - X-ray demonstrates swelling in the left wrist with no osseous abnormalities or fractures.  - Reviewed results with patient and suspect possible gouty arthritis.  - Initiated wrist brace and anti-inflammatory medication for left wrist pain and swelling.  - Plan to treat with medication and wrist brace for 1 week to reduce inflammation before considering therapy.  - Will consider further imaging if symptoms persist.    HYPERTENSION:  - Gold is taking lisinopril daily for hypertension.  - Blood pressure was elevated during the visit.  - Plan to recheck blood pressure after the patient relaxes and continue lisinopril, pending potential adjustment based on BP re-check.  - Aim to maintain blood pressure below 140/90.  - Advised patient to contact the office if BP remains elevated after relaxation and re-check.    HEADACHE:  - Gold reports experiencing severe headaches, which appear to be associated with elevated blood pressure.  - Plan to address headaches by managing hypertension.    DEPRESSION:  - Gold reports feeling depressed, especially when experiencing pain.  - Recommend psychiatric consultation and discussed importance of addressing depression and seeking professional help.  - Referred to psychiatrist, specifically Dominga, for depression management and potential medication adjustment.  - Mentioned possibility of prescribing  antidepressants such as trazodone.    DRY SKIN (XEROSIS CUTIS):  - Observed dry skin during exam and assessed the condition as treatable dry skin cutis.  - Educated on need for regular skin moisturizing and applying lotion or medicated cream daily after bathing.  - Initiated prescription cream or lotion for dry skin.    FOLLOW-UP:  - Follow up in 1 week if left wrist symptoms do not improve.           Medication List with Changes/Refills   New Medications    AMMONIUM LACTATE 12 % CREA    Apply to dry skin once or twice after bath   Current Medications    ALLOPURINOL (ZYLOPRIM) 300 MG TABLET    Take 1 tablet (300 mg total) by mouth once daily.    AMLODIPINE-BENAZEPRIL 5-20 MG (LOTREL) 5-20 MG PER CAPSULE    Take 1 capsule by mouth 2 (two) times a day.    BIKTARVY -25 MG PER TABLET    once daily.    BLOOD SUGAR DIAGNOSTIC STRP    To check BG 4 times daily, to use with insurance preferred meter    BLOOD-GLUCOSE METER MISC    To check BG 4 times daily, to use with insurance preferred meter    CHLORHEXIDINE (HIBICLENS) 4 % EXTERNAL LIQUID    Apply topically daily as needed (skin infection).    CLOBETASOL (TEMOVATE) 0.05 % EXTERNAL SOLUTION    SMARTSIG:Topical Morning-Evening    CLOTRIMAZOLE-BETAMETHASONE 1-0.05% (LOTRISONE) CREAM    Apply topically 2 (two) times daily.    EMPAGLIFLOZIN (JARDIANCE) 25 MG TABLET    Take 1 tablet (25 mg total) by mouth once daily.    ERGOCALCIFEROL (ERGOCALCIFEROL) 50,000 UNIT CAP    Take 1 capsule (50,000 Units total) by mouth every 7 days.    ESCITALOPRAM OXALATE (LEXAPRO) 20 MG TABLET    Take 20 mg by mouth once daily.    FUROSEMIDE (LASIX) 40 MG TABLET    Take 1 tablet (40 mg total) by mouth 2 (two) times daily.    GABAPENTIN (NEURONTIN) 300 MG CAPSULE    Take 300 mg by mouth 3 (three) times daily.    LANCETS MISC    To check BG 4 times daily, to use with insurance preferred meter    METFORMIN (GLUCOPHAGE) 1000 MG TABLET    Take 1 tablet (1,000 mg total) by mouth 2 (two) times  daily with meals.    METOPROLOL SUCCINATE (TOPROL-XL) 25 MG 24 HR TABLET    Take 1 tablet (25 mg total) by mouth once daily.    ROSUVASTATIN (CRESTOR) 40 MG TAB    Take 1 tablet (40 mg total) by mouth every evening.    TIRZEPATIDE (MOUNJARO) 10 MG/0.5 ML PNIJ    Inject 10 mg into the skin every 7 days.   Changed and/or Refilled Medications    Modified Medication Previous Medication    INDOMETHACIN (INDOCIN SR) 75 MG CPSR CR CAPSULE indomethacin (INDOCIN SR) 75 mg CpSR CR capsule       Take 1 capsule (75 mg total) by mouth 2 (two) times daily as needed (gout attack). 1 po BID for gout    Take 1 capsule (75 mg total) by mouth 2 (two) times daily as needed (gout attack). 1 po BID for gout    KETOCONAZOLE (NIZORAL) 2 % SHAMPOO ketoconazole (NIZORAL) 2 % shampoo       Apply topically twice a week.        TRAZODONE (DESYREL) 100 MG TABLET traZODone (DESYREL) 100 MG tablet       Take 1-2 tablets (100-200 mg total) by mouth nightly as needed for Insomnia or Depression.    Take 100-200 mg by mouth nightly as needed.        This note was generated with the assistance of ambient listening technology. Verbal consent was obtained by the patient and accompanying visitor(s) for the recording of patient appointment to facilitate this note. I attest to having reviewed and edited the generated note for accuracy, though some syntax or spelling errors may persist. Please contact the author of this note for any clarification.

## 2025-05-01 ENCOUNTER — RESULTS FOLLOW-UP (OUTPATIENT)
Dept: PRIMARY CARE CLINIC | Facility: CLINIC | Age: 35
End: 2025-05-01

## 2025-05-01 DIAGNOSIS — E11.42 TYPE 2 DIABETES MELLITUS WITH DIABETIC POLYNEUROPATHY, WITH LONG-TERM CURRENT USE OF INSULIN: ICD-10-CM

## 2025-05-01 DIAGNOSIS — M25.532 LEFT WRIST PAIN: Primary | ICD-10-CM

## 2025-05-01 DIAGNOSIS — I10 PRIMARY HYPERTENSION: ICD-10-CM

## 2025-05-01 DIAGNOSIS — Z79.4 TYPE 2 DIABETES MELLITUS WITH DIABETIC POLYNEUROPATHY, WITH LONG-TERM CURRENT USE OF INSULIN: ICD-10-CM

## 2025-05-02 ENCOUNTER — TELEPHONE (OUTPATIENT)
Dept: PSYCHOLOGY | Facility: CLINIC | Age: 35
End: 2025-05-02
Payer: MEDICARE

## 2025-05-02 NOTE — TELEPHONE ENCOUNTER
----- Message from Selma sent at 5/2/2025  4:04 PM CDT -----  Contact: Patient, [phone]  Type: Rx Clarification/ Additional Information/ QuestionsMedication: blood sugar diagnostic StrpWhat questions do you have about the medication, if any? Patient is stating Medicare is requiring a prior authorization. And does not have it yet. Please advise. Thanks.What information is needed?Pharmacy number:Pharmacy Contact Name:Comments:

## 2025-05-07 NOTE — TELEPHONE ENCOUNTER
Pt. States you said you would order MRI - if hand pain not improved. Please advise as I do not see this mentioned in your notes

## 2025-05-13 ENCOUNTER — TELEPHONE (OUTPATIENT)
Dept: PRIMARY CARE CLINIC | Facility: CLINIC | Age: 35
End: 2025-05-13

## 2025-05-13 ENCOUNTER — CLINICAL SUPPORT (OUTPATIENT)
Dept: PRIMARY CARE CLINIC | Facility: CLINIC | Age: 35
End: 2025-05-13
Payer: MEDICARE

## 2025-05-13 VITALS — SYSTOLIC BLOOD PRESSURE: 118 MMHG | DIASTOLIC BLOOD PRESSURE: 74 MMHG

## 2025-05-13 DIAGNOSIS — I10 ESSENTIAL HYPERTENSION: Primary | ICD-10-CM

## 2025-05-13 PROCEDURE — 99999 PR PBB SHADOW E&M-EST. PATIENT-LVL I: CPT | Mod: PBBFAC,,,

## 2025-05-13 PROCEDURE — 99211 OFF/OP EST MAY X REQ PHY/QHP: CPT | Mod: PBBFAC,PN

## 2025-05-13 NOTE — PROGRESS NOTES
Pt presents to clinic for 2wk Bp check. Had pt sit for 5-7mins before checking bp.Checked BP manually on both arms 118/74. Pt states he is taking amLODIPine-benazepriL 10-40 mg,and metoprolol 25mg as prescribed. Dr Chaparro made aware and stated no change in treatment. Advised pt to monitor bp at home and continue taking meds as prescribed, pt voiced understanding.

## 2025-05-13 NOTE — TELEPHONE ENCOUNTER
----- Message from Adina sent at 5/13/2025 10:41 AM CDT -----  Contact: [phone]  [email or web address] Patient is calling for Medical Advice regarding: Pt said he needs his ride called How long has patient had these symptoms:Pharmacy name and phone#:Patient wants a call back or thru [facility]: call back Comments:Please advise patient replies from provider may take up to 48 hours.

## 2025-05-13 NOTE — TELEPHONE ENCOUNTER
----- Message from Kenzie sent at 5/13/2025 10:53 AM CDT -----  Contact: [phone]  Would you like a call back, or a response through your [facility] portal?:   CallComments: Patient would like a call back as soon as possible, patient is waiting at the [place] location after his Nurses Visit on this morning. Urgent!

## 2025-05-15 ENCOUNTER — RESULTS FOLLOW-UP (OUTPATIENT)
Dept: PRIMARY CARE CLINIC | Facility: CLINIC | Age: 35
End: 2025-05-15
Payer: MEDICARE

## 2025-05-16 ENCOUNTER — TELEPHONE (OUTPATIENT)
Dept: PRIMARY CARE CLINIC | Facility: CLINIC | Age: 35
End: 2025-05-16
Payer: MEDICARE

## 2025-05-16 NOTE — TELEPHONE ENCOUNTER
Called patient with his MRI results and sent a message to Dr. Chaparro to send out a gout med for him.

## 2025-05-16 NOTE — TELEPHONE ENCOUNTER
----- Message from Adina sent at 5/16/2025  3:08 PM CDT -----  Contact: [phone]  Patient is returning a phone call.Who left a message for the patient: Does patient know what this is regarding:  resultsWould you like a call back, or a response through your [facility] portal?:   call back Comments:

## 2025-05-18 ENCOUNTER — TELEPHONE (OUTPATIENT)
Dept: PRIMARY CARE CLINIC | Facility: CLINIC | Age: 35
End: 2025-05-18
Payer: MEDICARE

## 2025-05-18 DIAGNOSIS — M10.9 GOUTY ARTHRITIS OF HAND: Primary | ICD-10-CM

## 2025-05-18 RX ORDER — COLCHICINE 0.6 MG/1
0.6 TABLET ORAL 2 TIMES DAILY PRN
Qty: 30 TABLET | Refills: 1 | Status: SHIPPED | OUTPATIENT
Start: 2025-05-18 | End: 2026-05-18

## 2025-06-20 ENCOUNTER — PATIENT MESSAGE (OUTPATIENT)
Dept: PRIMARY CARE CLINIC | Facility: CLINIC | Age: 35
End: 2025-06-20
Payer: MEDICARE

## 2025-06-20 ENCOUNTER — TELEPHONE (OUTPATIENT)
Dept: PSYCHOLOGY | Facility: CLINIC | Age: 35
End: 2025-06-20
Payer: MEDICARE

## 2025-06-30 ENCOUNTER — OFFICE VISIT (OUTPATIENT)
Dept: PRIMARY CARE CLINIC | Facility: CLINIC | Age: 35
End: 2025-06-30
Payer: MEDICARE

## 2025-06-30 VITALS
DIASTOLIC BLOOD PRESSURE: 88 MMHG | SYSTOLIC BLOOD PRESSURE: 126 MMHG | HEART RATE: 90 BPM | HEIGHT: 68 IN | OXYGEN SATURATION: 98 % | WEIGHT: 315 LBS | BODY MASS INDEX: 47.74 KG/M2 | TEMPERATURE: 99 F | RESPIRATION RATE: 18 BRPM

## 2025-06-30 DIAGNOSIS — B35.3 TINEA PEDIS OF BOTH FEET: ICD-10-CM

## 2025-06-30 DIAGNOSIS — L21.9 SEBORRHEIC DERMATITIS: ICD-10-CM

## 2025-06-30 DIAGNOSIS — I10 ESSENTIAL HYPERTENSION: ICD-10-CM

## 2025-06-30 DIAGNOSIS — Z01.00 DIABETIC EYE EXAM: Primary | ICD-10-CM

## 2025-06-30 DIAGNOSIS — E11.9 TYPE 2 DIABETES MELLITUS WITHOUT COMPLICATION, UNSPECIFIED WHETHER LONG TERM INSULIN USE: ICD-10-CM

## 2025-06-30 DIAGNOSIS — L01.00 IMPETIGO: ICD-10-CM

## 2025-06-30 DIAGNOSIS — E11.9 DIABETIC EYE EXAM: Primary | ICD-10-CM

## 2025-06-30 DIAGNOSIS — R60.0 MILD PERIPHERAL EDEMA: ICD-10-CM

## 2025-06-30 PROCEDURE — 99215 OFFICE O/P EST HI 40 MIN: CPT | Mod: PBBFAC,PN | Performed by: INTERNAL MEDICINE

## 2025-06-30 PROCEDURE — 99999 PR PBB SHADOW E&M-EST. PATIENT-LVL V: CPT | Mod: PBBFAC,,, | Performed by: INTERNAL MEDICINE

## 2025-06-30 PROCEDURE — 99213 OFFICE O/P EST LOW 20 MIN: CPT | Mod: S$PBB,,, | Performed by: INTERNAL MEDICINE

## 2025-06-30 RX ORDER — KETOCONAZOLE 200 MG/1
200 TABLET ORAL DAILY
Qty: 10 TABLET | Refills: 0 | Status: SHIPPED | OUTPATIENT
Start: 2025-06-30

## 2025-06-30 RX ORDER — MUPIROCIN 20 MG/G
OINTMENT TOPICAL 2 TIMES DAILY
Qty: 22 G | Refills: 0 | Status: SHIPPED | OUTPATIENT
Start: 2025-06-30

## 2025-06-30 RX ORDER — KETOCONAZOLE 20 MG/G
CREAM TOPICAL DAILY
Qty: 60 G | Refills: 1 | Status: SHIPPED | OUTPATIENT
Start: 2025-06-30

## 2025-06-30 RX ORDER — CEPHALEXIN 500 MG/1
500 CAPSULE ORAL 4 TIMES DAILY
Qty: 40 CAPSULE | Refills: 0 | Status: SHIPPED | OUTPATIENT
Start: 2025-06-30 | End: 2025-07-12

## 2025-06-30 NOTE — PROGRESS NOTES
Subjective:       Patient ID: Mickey D Jeansonne is a 34 y.o. male.    Chief Complaint: Rash, no energy, and Foot Swelling    HPI  History of Present Illness    CHIEF COMPLAINT:  Gold presents today with scalp irritation and itching.    DERMATOLOGIC CONCERNS:  He reports a long-standing scalp condition with recurrent severe itching that is difficult to control, causing him to scratch despite attempting to resist. The itching is intense and exacerbates head pain. The condition returns after hair cutting and has been a persistent issue. He has previously used Ketoconazole shampoo and cream, which have been ineffective in managing the condition. He currently uses regular shampoo with occasional application of prescribed Ketoconazole shampoo. He expresses frustration with the ongoing scalp issue and its impact on his comfort.    FOOT PROBLEMS:  He reports bilateral foot swelling, predominantly occurring at nighttime after walking during the day. He describes pain in the ankle area and toe region, with specific tenderness noted when pressure is applied. He takes fluid pills as needed for symptom management. Pain is described as significant, impacting his mobility and comfort. He also notes some scaling and potential fungal involvement in the foot area. Symptoms affect both feet.      ROS:  General: -fever, -chills, -fatigue, -weight gain, -weight loss  Eyes: -vision changes, -redness, -discharge  ENT: -ear pain, -nasal congestion, -sore throat  Cardiovascular: -chest pain, -palpitations, +lower extremity edema  Respiratory: -cough, -shortness of breath  Gastrointestinal: -abdominal pain, -nausea, -vomiting, -diarrhea, -constipation, -blood in stool  Genitourinary: -dysuria, -hematuria, -frequency  Musculoskeletal: -joint pain, -muscle pain, +limb pain  Skin: -rash, -lesion, +itching, +head pain, +skin cracking, +dry skin  Neurological: -headache, -dizziness, -numbness, -tingling  Psychiatric: -anxiety, -depression,  -sleep difficulty       Review of Systems    Objective:      Physical Exam  Physical Exam    General: No acute distress. Well-developed. Well-nourished.over weigh  Eyes: EOMI. Sclerae anicteric.  HENT: Normocephalic. Atraumatic. Nares patent. Moist oral mucosa.  Ears: Bilateral TMs clear. Bilateral EACs clear.  Cardiovascular: Regular rate. Regular rhythm. No murmurs. No rubs. No gallops. Normal S1, S2.  Respiratory: Normal respiratory effort. Clear to auscultation bilaterally. No rales. No rhonchi. No wheezing.  Abdomen: Soft. Non-tender. Non-distended. Normoactive bowel sounds.  Musculoskeletal: No  obvious deformity.  Extremities: No lower extremity edema.  Neurological: Alert & oriented x3. No slurred speech. Normal gait.  Psychiatric: Normal mood. Normal affect. Good insight. Good judgment.  Skin: Warm. Scaly skin on feet. No rash.empitigo like lesions on scalp with thick green yellow oily scaly skin           Assessment:       1. Diabetic eye exam    2. Seborrheic dermatitis    3. Impetigo    4. Tinea pedis of both feet    5. Mild peripheral edema    6. Essential hypertension    7. Type 2 diabetes mellitus without complication, unspecified whether long term insulin use        Plan:       Diabetic eye exam  -     Diabetic Eye Screening Photo; Future    Seborrheic dermatitis  Comments:  dermatology consult if not better  Orders:  -     cephalexin (KEFTAB) 500 mg tablet; Take 1 tablet (500 mg total) by mouth 4 (four) times daily. for 10 days  Dispense: 40 tablet; Refill: 0  -     mupirocin (BACTROBAN) 2 % ointment; Apply topically 2 (two) times daily.  Dispense: 22 g; Refill: 0  -     ketoconazole (NIZORAL) 2 % cream; Apply topically once daily.  Dispense: 60 g; Refill: 1  -     ketoconazole (NIZORAL) 200 mg Tab; Take 1 tablet (200 mg total) by mouth once daily.  Dispense: 10 tablet; Refill: 0    Impetigo  -     cephalexin (KEFTAB) 500 mg tablet; Take 1 tablet (500 mg total) by mouth 4 (four) times daily. for 10  days  Dispense: 40 tablet; Refill: 0    Tinea pedis of both feet  -     ketoconazole (NIZORAL) 200 mg Tab; Take 1 tablet (200 mg total) by mouth once daily.  Dispense: 10 tablet; Refill: 0    Mild peripheral edema  Comments:  continue with lasix    Essential hypertension  Comments:  BP fairly controlled continue with tx    Type 2 diabetes mellitus without complication, unspecified whether long term insulin use  Comments:  well controlled Hgba1c 6.5 continue with tx      Assessment & Plan    B35.0 Tinea barbae and tinea capitis  B35.3 Tinea pedis  L29.9 Pruritus, unspecified  M25.571 Pain in right ankle and joints of right foot  M25.572 Pain in left ankle and joints of left foot  R60.0 Localized edema    ## TINEA CAPITIS (SCALP FUNGAL INFECTION):  - Assessed patient's recurrent scalp condition with severe pruritus leading to scratching, bleeding, and potential secondary bacterial infection.  - Current treatment with Ketoconazole shampoo and cream has been ineffective.  - Explained the difference between bacterial and fungal infections on the scalp and discussed risks of infection from scratching.  - Prescribed new medicated shampoo and initiated oral antifungal medication to address both fungal and bacterial components.  - Advised on proper scalp care to minimize scratching and secondary infection.    ## TINEA PEDIS (FOOT FUNGAL INFECTION):  - Identified fungal infection on feet, distinct from scalp condition.  - Gold reports scaly skin with edema and pain, especially at night.  - Prescribed antifungal cream for topical application.  - The oral antifungal medication initiated will address both the scalp and foot fungal conditions.    ## PRURITUS MANAGEMENT:  - Discussed and initiated oral medication options to control pruritus associated with the fungal infections.    ## RIGHT FOOT AND ANKLE PAIN:  - Evaluated persistent pain in right ankle and foot, especially after ambulation, possibly related to fluid  retention.  - Prescribed topical analgesic cream for pain management.    ## LEFT FOOT AND ANKLE PAIN:  - Evaluated persistent pain in left ankle and foot, especially after ambulation, possibly related to fluid retention.  - Prescribed topical analgesic cream for pain management.    ## LOCALIZED EDEMA:  - Evaluated persistent edema in feet, particularly nocturnal, possibly related to fluid retention.  - Prescribed topical cream for management.  - Advised patient on elevation techniques and proper footwear to minimize edema.    ## FOLLOW-UP:  - Will consider podiatrist referral if condition does not improve.           Medication List with Changes/Refills   New Medications    CEPHALEXIN (KEFTAB) 500 MG TABLET    Take 1 tablet (500 mg total) by mouth 4 (four) times daily. for 10 days    KETOCONAZOLE (NIZORAL) 2 % CREAM    Apply topically once daily.    KETOCONAZOLE (NIZORAL) 200 MG TAB    Take 1 tablet (200 mg total) by mouth once daily.    MUPIROCIN (BACTROBAN) 2 % OINTMENT    Apply topically 2 (two) times daily.   Current Medications    ALLOPURINOL (ZYLOPRIM) 300 MG TABLET    Take 1 tablet (300 mg total) by mouth once daily.    AMLODIPINE-BENAZEPRIL (LOTREL) 10-40 MG PER CAPSULE    Take 1 capsule by mouth once daily.    AMMONIUM LACTATE 12 % CREA    Apply to dry skin once or twice after bath    BIKTARVY -25 MG PER TABLET    once daily.    BLOOD SUGAR DIAGNOSTIC STRP    To check BG 4 times daily, to use with insurance preferred meter    BLOOD-GLUCOSE METER MISC    To check BG 4 times daily, to use with insurance preferred meter    CHLORHEXIDINE (HIBICLENS) 4 % EXTERNAL LIQUID    Apply topically daily as needed (skin infection).    CLOBETASOL (TEMOVATE) 0.05 % EXTERNAL SOLUTION    SMARTSIG:Topical Morning-Evening    CLOTRIMAZOLE-BETAMETHASONE 1-0.05% (LOTRISONE) CREAM    Apply topically 2 (two) times daily.    COLCHICINE (COLCRYS) 0.6 MG TABLET    Take 1 tablet (0.6 mg total) by mouth 2 (two) times daily as needed  (gout).    EMPAGLIFLOZIN (JARDIANCE) 25 MG TABLET    Take 1 tablet (25 mg total) by mouth once daily.    ERGOCALCIFEROL (ERGOCALCIFEROL) 50,000 UNIT CAP    Take 1 capsule (50,000 Units total) by mouth every 7 days.    ESCITALOPRAM OXALATE (LEXAPRO) 20 MG TABLET    Take 20 mg by mouth once daily.    FUROSEMIDE (LASIX) 40 MG TABLET    Take 1 tablet (40 mg total) by mouth 2 (two) times daily.    GABAPENTIN (NEURONTIN) 300 MG CAPSULE    Take 300 mg by mouth 3 (three) times daily.    INDOMETHACIN (INDOCIN SR) 75 MG CPSR CR CAPSULE    Take 1 capsule (75 mg total) by mouth 2 (two) times daily as needed (gout attack).    LANCETS MISC    To check BG 4 times daily, to use with insurance preferred meter    METFORMIN (GLUCOPHAGE) 1000 MG TABLET    Take 1 tablet (1,000 mg total) by mouth 2 (two) times daily with meals.    METOPROLOL SUCCINATE (TOPROL-XL) 25 MG 24 HR TABLET    Take 1 tablet (25 mg total) by mouth once daily.    ROSUVASTATIN (CRESTOR) 40 MG TAB    Take 1 tablet (40 mg total) by mouth every evening.    TIRZEPATIDE (MOUNJARO) 10 MG/0.5 ML PNIJ    Inject 10 mg into the skin every 7 days.    TRAZODONE (DESYREL) 100 MG TABLET    Take 1-2 tablets (100-200 mg total) by mouth nightly as needed for Insomnia or Depression.   Discontinued Medications    KETOCONAZOLE (NIZORAL) 2 % SHAMPOO    Apply topically twice a week.        This note was generated with the assistance of ambient listening technology. Verbal consent was obtained by the patient and accompanying visitor(s) for the recording of patient appointment to facilitate this note. I attest to having reviewed and edited the generated note for accuracy, though some syntax or spelling errors may persist. Please contact the author of this note for any clarification.

## 2025-07-15 ENCOUNTER — TELEPHONE (OUTPATIENT)
Dept: PSYCHOLOGY | Facility: CLINIC | Age: 35
End: 2025-07-15
Payer: MEDICARE

## 2025-07-15 ENCOUNTER — OFFICE VISIT (OUTPATIENT)
Dept: PSYCHOLOGY | Facility: CLINIC | Age: 35
End: 2025-07-15
Payer: MEDICARE

## 2025-07-15 ENCOUNTER — PATIENT MESSAGE (OUTPATIENT)
Dept: PSYCHOLOGY | Facility: CLINIC | Age: 35
End: 2025-07-15
Payer: MEDICARE

## 2025-07-15 VITALS
DIASTOLIC BLOOD PRESSURE: 82 MMHG | TEMPERATURE: 98 F | HEART RATE: 90 BPM | OXYGEN SATURATION: 97 % | SYSTOLIC BLOOD PRESSURE: 127 MMHG

## 2025-07-15 DIAGNOSIS — F41.1 GENERALIZED ANXIETY DISORDER: Primary | ICD-10-CM

## 2025-07-15 DIAGNOSIS — F32.1 CURRENT MODERATE EPISODE OF MAJOR DEPRESSIVE DISORDER, UNSPECIFIED WHETHER RECURRENT: ICD-10-CM

## 2025-07-15 DIAGNOSIS — F51.01 PRIMARY INSOMNIA: ICD-10-CM

## 2025-07-15 DIAGNOSIS — F33.3 SEVERE EPISODE OF RECURRENT MAJOR DEPRESSIVE DISORDER, WITH PSYCHOTIC FEATURES: ICD-10-CM

## 2025-07-15 PROCEDURE — 99215 OFFICE O/P EST HI 40 MIN: CPT | Mod: PBBFAC,PN | Performed by: NURSE PRACTITIONER

## 2025-07-15 PROCEDURE — 99999 PR PBB SHADOW E&M-EST. PATIENT-LVL V: CPT | Mod: PBBFAC,,, | Performed by: NURSE PRACTITIONER

## 2025-07-15 PROCEDURE — 99205 OFFICE O/P NEW HI 60 MIN: CPT | Mod: S$PBB,,, | Performed by: NURSE PRACTITIONER

## 2025-07-15 PROCEDURE — G2211 COMPLEX E/M VISIT ADD ON: HCPCS | Mod: S$PBB,,, | Performed by: NURSE PRACTITIONER

## 2025-07-15 RX ORDER — ESCITALOPRAM OXALATE 20 MG/1
20 TABLET ORAL DAILY
Qty: 30 TABLET | Refills: 2 | Status: SHIPPED | OUTPATIENT
Start: 2025-07-15 | End: 2025-10-13

## 2025-07-15 RX ORDER — BUPROPION HYDROCHLORIDE 150 MG/1
150 TABLET ORAL DAILY
Qty: 30 TABLET | Refills: 1 | Status: SHIPPED | OUTPATIENT
Start: 2025-07-15 | End: 2025-09-13
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
## 2025-07-21 ENCOUNTER — TELEPHONE (OUTPATIENT)
Dept: DIABETES | Facility: CLINIC | Age: 35
End: 2025-07-21
Payer: MEDICARE

## 2025-07-22 ENCOUNTER — TELEPHONE (OUTPATIENT)
Dept: DIABETES | Facility: CLINIC | Age: 35
End: 2025-07-22
Payer: MEDICARE

## 2025-07-23 ENCOUNTER — TELEPHONE (OUTPATIENT)
Dept: DIABETES | Facility: CLINIC | Age: 35
End: 2025-07-23

## 2025-07-23 ENCOUNTER — OFFICE VISIT (OUTPATIENT)
Dept: DIABETES | Facility: CLINIC | Age: 35
End: 2025-07-23
Payer: MEDICARE

## 2025-07-23 VITALS — HEIGHT: 68 IN | WEIGHT: 315 LBS | BODY MASS INDEX: 47.74 KG/M2

## 2025-07-23 DIAGNOSIS — E11.65 TYPE 2 DIABETES MELLITUS WITH HYPERGLYCEMIA, WITH LONG-TERM CURRENT USE OF INSULIN: Primary | ICD-10-CM

## 2025-07-23 DIAGNOSIS — G47.33 OSA (OBSTRUCTIVE SLEEP APNEA): ICD-10-CM

## 2025-07-23 DIAGNOSIS — E66.813 CLASS 3 SEVERE OBESITY DUE TO EXCESS CALORIES WITH SERIOUS COMORBIDITY AND BODY MASS INDEX (BMI) OF 50.0 TO 59.9 IN ADULT: ICD-10-CM

## 2025-07-23 DIAGNOSIS — E78.5 DYSLIPIDEMIA, GOAL LDL BELOW 100: ICD-10-CM

## 2025-07-23 DIAGNOSIS — E11.42 TYPE 2 DIABETES MELLITUS WITH DIABETIC POLYNEUROPATHY, WITH LONG-TERM CURRENT USE OF INSULIN: ICD-10-CM

## 2025-07-23 DIAGNOSIS — Z21 HIV INFECTION, UNSPECIFIED SYMPTOM STATUS: ICD-10-CM

## 2025-07-23 DIAGNOSIS — Z79.4 TYPE 2 DIABETES MELLITUS WITH DIABETIC POLYNEUROPATHY, WITH LONG-TERM CURRENT USE OF INSULIN: ICD-10-CM

## 2025-07-23 DIAGNOSIS — I10 PRIMARY HYPERTENSION: ICD-10-CM

## 2025-07-23 DIAGNOSIS — E55.9 VITAMIN D DEFICIENCY: ICD-10-CM

## 2025-07-23 DIAGNOSIS — Z59.9 FINANCIAL DIFFICULTIES: ICD-10-CM

## 2025-07-23 DIAGNOSIS — Z71.9 HEALTH EDUCATION/COUNSELING: ICD-10-CM

## 2025-07-23 DIAGNOSIS — Z79.4 TYPE 2 DIABETES MELLITUS WITH HYPERGLYCEMIA, WITH LONG-TERM CURRENT USE OF INSULIN: Primary | ICD-10-CM

## 2025-07-23 PROCEDURE — 98006 SYNCH AUDIO-VIDEO EST MOD 30: CPT | Mod: 95,,, | Performed by: NURSE PRACTITIONER

## 2025-07-23 RX ORDER — ERGOCALCIFEROL 1.25 MG/1
50000 CAPSULE ORAL
Qty: 12 CAPSULE | Refills: 2 | Status: SHIPPED | OUTPATIENT
Start: 2025-07-23

## 2025-07-23 RX ORDER — ROSUVASTATIN CALCIUM 40 MG/1
40 TABLET, COATED ORAL NIGHTLY
Qty: 90 TABLET | Refills: 3 | Status: SHIPPED | OUTPATIENT
Start: 2025-07-23 | End: 2026-07-23

## 2025-07-23 RX ORDER — EZETIMIBE 10 MG/1
10 TABLET ORAL NIGHTLY
Qty: 90 TABLET | Refills: 2 | Status: SHIPPED | OUTPATIENT
Start: 2025-07-23

## 2025-07-23 RX ORDER — TIRZEPATIDE 12.5 MG/.5ML
12.5 INJECTION, SOLUTION SUBCUTANEOUS
Qty: 4 PEN | Refills: 6 | Status: SHIPPED | OUTPATIENT
Start: 2025-07-23

## 2025-07-23 RX ORDER — METFORMIN HYDROCHLORIDE 1000 MG/1
1000 TABLET ORAL
Qty: 90 TABLET | Refills: 2 | Status: SHIPPED | OUTPATIENT
Start: 2025-07-23 | End: 2026-07-23

## 2025-07-23 SDOH — SOCIAL DETERMINANTS OF HEALTH (SDOH): PROBLEM RELATED TO HOUSING AND ECONOMIC CIRCUMSTANCES, UNSPECIFIED: Z59.9

## 2025-07-23 NOTE — ASSESSMENT & PLAN NOTE
A1c has improved to 5.9%   Tolerating the Mounjaro well  Denies any GI upset  Lost 10 lb since our last visit  Interested in increasing the Mounjaro to help with further weight loss      Unfortunately he can not get CGM therapy due to cost---we have tried DME and pharmacy---we have tried Dexcom and Luis A  Discussed with patient if he is not on CGM therapy that he must start checking his blood sugars  I have reordered the glucometer    Urine MAC + X 1 --has improved      -- Medication Changes:     Continue Jardiance 25 mg daily  -- Please let us know if you have nausea, vomiting, or weakness with a normal BG. This could be euglycemic DKA.  -- please hold medication 3 days prior to surgery or if you are sick and have decreased intake.   -- Please drink lots of water daily while on this medication.   --This medication could also give you a yeast infection- please let us know if this occurs and we can treat it.   -- no keto diet     Increase Mounjaro to 12.5 mg weekly  Discussed the importance of diet and exercise with the Mounjaro to help with further weight loss    With increasing the Mounjaro to 12.5 mg weekly---cut back on the metformin to 1000 mg daily          -- Reviewed goals of therapy are to get the best control we can without hypoglycemia.  -- Reviewed patient's current insulin regimen. Clarified proper insulin dose and timing in relation to meals, etc. Insulin injection sites and proper rotation instructed.    -- Advised frequent self blood glucose monitoring.  Patient encouraged to document glucose results and bring them to every clinic visit.  Dexcom was too expensive through DME  Will try Luis A 3 through pharmacy--too expensive and not covered  Continue to check blood sugar twice a day at alternating times  -- Hypoglycemia precautions discussed. Instructed on precautions before driving.    -- Call for Bg repeatedly < 70 or > 180.   -- Close adherence to lifestyle changes recommended.   -- Periodic follow  ups for eye evaluations, foot care and dental care suggested.

## 2025-07-23 NOTE — ASSESSMENT & PLAN NOTE
Optimize BG readings.   See above.   On gabapentin     Educated patient to check feet daily for any foreign objects and/or wounds. Discussed with patient the importance of wearing appropriate footwear at all times, not to walk barefoot ever, and to check shoes before putting them on feet. Instructed patient to keep feet dry by regularly changing shoes and socks and drying feet after baths and exercises. Also, instructed patient to report any new lesions, discolorations, or swelling to a healthcare professional.     How Severe Is It?: severe Is This A New Presentation, Or A Follow-Up?: Rash

## 2025-07-23 NOTE — ASSESSMENT & PLAN NOTE
Body mass index is 58.08 kg/m².  Increases insulin resistance.   Discussed DM diet and exercise.

## 2025-07-23 NOTE — ASSESSMENT & PLAN NOTE
On statin per ADA recommendations  LDL goal < 100.   LDL is above goal on Crestor 40 mg nightly  Reports compliance  Add in Zetia 10 mg nightly  Check lipids with RTC

## 2025-07-23 NOTE — PROGRESS NOTES
The patient location is: [facility]   The chief complaint leading to consultation is: Diabetes management- follow up     Visit type: audiovisual    Face to Face time with patient: 20 minutes   30  minutes of total time spent on the encounter, which includes face to face time and non-face to face time preparing to see the patient (eg, review of tests), Obtaining and/or reviewing separately obtained history, Documenting clinical information in the electronic or other health record, Independently interpreting results (not separately reported) and communicating results to the patient/family/caregiver, or Care coordination (not separately reported).         Each patient to whom he or she provides medical services by telemedicine is:  (1) informed of the relationship between the physician and patient and the respective role of any other health care provider with respect to management of the patient; and (2) notified that he or she may decline to receive medical services by telemedicine and may withdraw from such care at any time.    Notes:       CC:   Chief Complaint   Patient presents with    Diabetes Mellitus       HPI: Mickey D Jeansonne is a 34 y.o. male presents for a follow-up visit today for the management of Diabetes     He was diagnosed with Type 2 diabetes in 2019 on routine labs when he had HA and fatigue.       Family hx of diabetes: mother and father ( )   Hospitalized for diabetes--yes  HHS 2024  Insulin therapy--insulin was prescribed after HHS in 2024      No personal or FH of thyroid cancer or personal of pancreatic cancer or pancreatitis.       Patient was  hospitalized on 2024 for hyperosmolar hyperglycemic state  He was sent home on insulin therapy however he never picked up the insulin       Our last visit was a virtual visit in 2025  At that visit we continued metformin 1000 mg twice a day  Continued Jardiance 25 mg daily    Continued Mounjaro 10 mg weekly since he had  just increased to that dose 2 weeks prior to our visit  We did discuss at the next visit possibly increasing to the 12.5 mg weekly if needed  Denies GI upset    His most recent A1c is 5.9% which is down from 6.8%    Weight from 392 lb to 382 lb          DIABETES COMPLICATIONS: peripheral neuropathy    Urine MAC + X 1     Diabetes Management Status    ASA:  No    Statin: Taking--Crestor 40 mg nightly   ACE/ARB: Taking--benazepril 20 mg     The ASCVD Risk score (Jerome ANDERSON, et al., 2019) failed to calculate for the following reasons:    The 2019 ASCVD risk score is only valid for ages 40 to 79      Screening or Prevention Patient's value Goal Complete/Controlled?   HgA1C Testing and Control   Lab Results   Component Value Date    HGBA1C 5.9 (H) 07/15/2025      Annually/Less than 8% No   Lipid profile : 07/15/2025 Annually Yes   LDL control Lab Results   Component Value Date    LDLCALC 131.0 07/15/2025    Annually/Less than 100 mg/dl  No   Nephropathy screening Lab Results   Component Value Date    LABMICR 8.0 12/11/2024     Lab Results   Component Value Date    PROTEINUA Negative 05/28/2024    Annually Yes   Blood pressure BP Readings from Last 1 Encounters:   06/30/25 126/88    Less than 140/90 No   Dilated retinal exam : 06/13/2024 Annually Yes   Foot exam   : 04/29/2025 Annually No       CURRENT A1C:    Hemoglobin A1C   Date Value Ref Range Status   03/12/2025 6.8 (H) 4.0 - 5.6 % Final     Comment:     ADA Screening Guidelines:  5.7-6.4%  Consistent with prediabetes  >or=6.5%  Consistent with diabetes    High levels of fetal hemoglobin interfere with the HbA1C  assay. Heterozygous hemoglobin variants (HbS, HgC, etc)do  not significantly interfere with this assay.   However, presence of multiple variants may affect accuracy.     12/11/2024 7.9 (H) 4.0 - 5.6 % Final     Comment:     ADA Screening Guidelines:  5.7-6.4%  Consistent with prediabetes  >or=6.5%  Consistent with diabetes    High levels of fetal hemoglobin  interfere with the HbA1C  assay. Heterozygous hemoglobin variants (HbS, HgC, etc)do  not significantly interfere with this assay.   However, presence of multiple variants may affect accuracy.     11/08/2024 8.2 (H) 4.7 - 5.6 % Final   08/01/2024 7.1 (H) 4.0 - 5.6 % Final     Comment:     ADA Screening Guidelines:  5.7-6.4%  Consistent with prediabetes  >or=6.5%  Consistent with diabetes    High levels of fetal hemoglobin interfere with the HbA1C  assay. Heterozygous hemoglobin variants (HbS, HgC, etc)do  not significantly interfere with this assay.   However, presence of multiple variants may affect accuracy.     08/01/2024 7.1 (H) 4.0 - 5.6 % Final     Comment:     ADA Screening Guidelines:  5.7-6.4%  Consistent with prediabetes  >or=6.5%  Consistent with diabetes    High levels of fetal hemoglobin interfere with the HbA1C  assay. Heterozygous hemoglobin variants (HbS, HgC, etc)do  not significantly interfere with this assay.   However, presence of multiple variants may affect accuracy.       Hemoglobin A1c   Date Value Ref Range Status   07/15/2025 5.9 (H) 4.0 - 5.6 % Final     Comment:     ADA Screening Guidelines:  5.7-6.4%  Consistent with prediabetes  >=6.5%  Consistent with diabetes    High levels of fetal hemoglobin interfere with the HbA1C  assay. Heterozygous hemoglobin variants (HbS, HgC, etc)do  not significantly interfere with this assay.   However, presence of multiple variants may affect accuracy.       GOAL A1C: 6.5% without hypoglycemia      DM MEDICATIONS USED IN THE PAST:  NovoLog, metformin, Ozempic  Levemir   Tresiba   Dexocm - too expensive   Jardiance   Luis A - cost   Mounjaro         CURRENT DIABETES MEDICATIONS:    Metformin 1000 mg 2 times per day   Mounjaro 10 mg weekly on Saturdays -  Jardiance 25 mg daily     Insulin:N/A   Missed doses: denies         BLOOD GLUCOSE MONITORING:    Checking his BG 2 times per day   Per oral recall:   Ranging from 83 to 212    Average 's          HYPOGLYCEMIA:  denies        MEALS: eating 2 meals per day   BF: skips   Lunch: baked meats, veggies, and fruits   Dinner: same as lunch   Snack: rarely reported   Drinks: water  Occ regular cokes          CURRENT EXERCISE:  denies         Review of Systems  Review of Systems   Constitutional:  Negative for appetite change, fatigue and unexpected weight change.   HENT:  Negative for trouble swallowing.    Eyes:  Negative for visual disturbance.   Respiratory:  Negative for shortness of breath.    Cardiovascular:  Positive for leg swelling (occ). Negative for chest pain.   Gastrointestinal:  Negative for abdominal distention, abdominal pain, constipation, diarrhea, nausea and vomiting.   Endocrine: Negative for polydipsia, polyphagia and polyuria.   Genitourinary:         No Nocturia    Musculoskeletal:  Negative for arthralgias.        Gout flare ups    Skin:  Positive for wound (sores in his scalp).   Neurological:  Positive for headaches. Negative for numbness.       Physical Exam   Physical Exam  Constitutional:       General: He is not in acute distress.     Appearance: Normal appearance. He is obese. He is not ill-appearing.   HENT:      Head: Normocephalic and atraumatic.      Nose: Nose normal.   Pulmonary:      Effort: Pulmonary effort is normal.   Neurological:      General: No focal deficit present.      Mental Status: He is alert and oriented to person, place, and time.   Psychiatric:         Mood and Affect: Mood normal.         Behavior: Behavior normal.         Thought Content: Thought content normal.         Judgment: Judgment normal.             FOOT EXAMINATION:  Deferred due to virtual visit          Lab Results   Component Value Date    TSH 1.377 03/12/2025           Type 2 diabetes mellitus with hyperglycemia, with long-term current use of insulin  A1c has improved to 5.9%   Tolerating the Mounjaro well  Denies any GI upset  Lost 10 lb since our last visit  Interested in increasing the  Mounjaro to help with further weight loss      Unfortunately he can not get CGM therapy due to cost---we have tried DME and pharmacy---we have tried Dexcom and Luis A  Discussed with patient if he is not on CGM therapy that he must start checking his blood sugars  I have reordered the glucometer    Urine MAC + X 1 --has improved      -- Medication Changes:     Continue Jardiance 25 mg daily  -- Please let us know if you have nausea, vomiting, or weakness with a normal BG. This could be euglycemic DKA.  -- please hold medication 3 days prior to surgery or if you are sick and have decreased intake.   -- Please drink lots of water daily while on this medication.   --This medication could also give you a yeast infection- please let us know if this occurs and we can treat it.   -- no keto diet     Increase Mounjaro to 12.5 mg weekly  Discussed the importance of diet and exercise with the Mounjaro to help with further weight loss    With increasing the Mounjaro to 12.5 mg weekly---cut back on the metformin to 1000 mg daily          -- Reviewed goals of therapy are to get the best control we can without hypoglycemia.  -- Reviewed patient's current insulin regimen. Clarified proper insulin dose and timing in relation to meals, etc. Insulin injection sites and proper rotation instructed.    -- Advised frequent self blood glucose monitoring.  Patient encouraged to document glucose results and bring them to every clinic visit.  Dexcom was too expensive through DME  Will try Luis A 3 through pharmacy--too expensive and not covered  Continue to check blood sugar twice a day at alternating times  -- Hypoglycemia precautions discussed. Instructed on precautions before driving.    -- Call for Bg repeatedly < 70 or > 180.   -- Close adherence to lifestyle changes recommended.   -- Periodic follow ups for eye evaluations, foot care and dental care suggested.        Hypertension  BP goal is < 140/90.   Tolerating ACEi  Blood pressure  goals discussed with patient  Following with the digital hypertension team  Will defer to them for management    HIV (human immunodeficiency virus infection)  Increases insulin resistance  Continue to follow with Infectious Disease    Class 3 severe obesity due to excess calories with serious comorbidity and body mass index (BMI) of 50.0 to 59.9 in adult  Body mass index is 58.08 kg/m².  Increases insulin resistance.   Discussed DM diet and exercise.       GASTON (obstructive sleep apnea)  If uncontrolled increases insulin resistance  Not using CPAP machine     Dyslipidemia, goal LDL below 100  On statin per ADA recommendations  LDL goal < 100.   LDL is above goal on Crestor 40 mg nightly  Reports compliance  Add in Zetia 10 mg nightly  Check lipids with RTC    Type 2 diabetes mellitus with diabetic polyneuropathy, with long-term current use of insulin  Optimize BG readings.   See above.   On gabapentin     Educated patient to check feet daily for any foreign objects and/or wounds. Discussed with patient the importance of wearing appropriate footwear at all times, not to walk barefoot ever, and to check shoes before putting them on feet. Instructed patient to keep feet dry by regularly changing shoes and socks and drying feet after baths and exercises. Also, instructed patient to report any new lesions, discolorations, or swelling to a healthcare professional.      Vitamin D deficiency  Continue ergo weekly           Does have some financial constraints which limits treatment options          I spent a total of 30 minutes on the day of the visit.This includes face to face time and non-face to face time preparing to see the patient (eg, review of tests), obtaining and/or reviewing separately obtained history, documenting clinical information in the electronic or other health record, independently interpreting results and communicating results to the patient/family/caregiver, or care coordinator.          Follow up in  about 4 months (around 11/23/2025).  Follow up with me in 4 months with labs prior           Orders Placed This Encounter   Procedures    Hemoglobin A1C     Standing Status:   Future     Expected Date:   10/23/2025     Expiration Date:   1/23/2027    Comprehensive Metabolic Panel     Standing Status:   Future     Expected Date:   10/23/2025     Expiration Date:   1/23/2027    Lipid Panel     Standing Status:   Future     Expected Date:   10/23/2025     Expiration Date:   1/23/2027    Microalbumin/Creatinine Ratio, Urine     Standing Status:   Future     Expected Date:   10/23/2025     Expiration Date:   1/23/2027     Specimen Source:   Urine       Recommendations were discussed with the patient in detail  The patient verbalized understanding and agrees with the plan outlined as above.     This note was partly generated with [employer] voice recognition software. I apologize for any possible typographical errors.

## 2025-07-23 NOTE — ASSESSMENT & PLAN NOTE
BP goal is < 140/90.   Tolerating ACEi  Blood pressure goals discussed with patient  Following with the digital hypertension team  Will defer to them for management

## 2025-07-28 ENCOUNTER — PATIENT MESSAGE (OUTPATIENT)
Dept: PRIMARY CARE CLINIC | Facility: CLINIC | Age: 35
End: 2025-07-28
Payer: MEDICARE

## 2025-07-31 ENCOUNTER — TELEPHONE (OUTPATIENT)
Dept: PRIMARY CARE CLINIC | Facility: CLINIC | Age: 35
End: 2025-07-31
Payer: MEDICARE

## 2025-07-31 NOTE — TELEPHONE ENCOUNTER
Copied from CRM #[id number]. Topic: General Inquiry - Patient Advice  >> Jul 31, 2025  3:18 PM Kenzie wrote:  Type:  Needs Medical Advice    Who Called: Mickey D Jeansonne    Symptoms (please be specific):    How long has patient had these symptoms:    Pharmacy name and phone #:    Would the patient rather a call back or a response via [facility]? phone  Best Call Back Number: [phone]  Additional Information: patient called, would like a call back from Dr Chaparro's office in regards the referral that was sent it to them by [facility]. Please call and advise. Thank you

## 2025-08-01 ENCOUNTER — TELEPHONE (OUTPATIENT)
Dept: PRIMARY CARE CLINIC | Facility: CLINIC | Age: 35
End: 2025-08-01
Payer: MEDICARE

## 2025-08-01 NOTE — TELEPHONE ENCOUNTER
Patient returned my call and was informed that I am waiting for Dr. Chaparro to put the order in for EMG.   room air

## 2025-08-07 ENCOUNTER — PATIENT MESSAGE (OUTPATIENT)
Dept: PRIMARY CARE CLINIC | Facility: CLINIC | Age: 35
End: 2025-08-07
Payer: MEDICARE

## 2025-08-19 ENCOUNTER — PATIENT MESSAGE (OUTPATIENT)
Dept: PRIMARY CARE CLINIC | Facility: CLINIC | Age: 35
End: 2025-08-19
Payer: MEDICARE

## 2025-08-19 ENCOUNTER — TELEPHONE (OUTPATIENT)
Dept: PRIMARY CARE CLINIC | Facility: CLINIC | Age: 35
End: 2025-08-19
Payer: MEDICARE

## 2025-08-27 ENCOUNTER — TELEPHONE (OUTPATIENT)
Facility: CLINIC | Age: 35
End: 2025-08-27
Payer: MEDICARE

## 2025-09-04 ENCOUNTER — OFFICE VISIT (OUTPATIENT)
Dept: PRIMARY CARE CLINIC | Facility: CLINIC | Age: 35
End: 2025-09-04
Payer: MEDICARE

## 2025-09-04 VITALS
SYSTOLIC BLOOD PRESSURE: 134 MMHG | TEMPERATURE: 98 F | HEIGHT: 68 IN | HEART RATE: 74 BPM | OXYGEN SATURATION: 96 % | DIASTOLIC BLOOD PRESSURE: 84 MMHG | BODY MASS INDEX: 47.74 KG/M2 | WEIGHT: 315 LBS

## 2025-09-04 DIAGNOSIS — Z21 HIV INFECTION, UNSPECIFIED SYMPTOM STATUS: ICD-10-CM

## 2025-09-04 DIAGNOSIS — Z23 NEED FOR VACCINATION: ICD-10-CM

## 2025-09-04 DIAGNOSIS — Z79.4 TYPE 2 DIABETES MELLITUS WITH DIABETIC POLYNEUROPATHY, WITH LONG-TERM CURRENT USE OF INSULIN: ICD-10-CM

## 2025-09-04 DIAGNOSIS — I10 ESSENTIAL HYPERTENSION: ICD-10-CM

## 2025-09-04 DIAGNOSIS — E66.01 MORBID OBESITY WITH BMI OF 50.0-59.9, ADULT: ICD-10-CM

## 2025-09-04 DIAGNOSIS — E11.42 TYPE 2 DIABETES MELLITUS WITH DIABETIC POLYNEUROPATHY, WITH LONG-TERM CURRENT USE OF INSULIN: ICD-10-CM

## 2025-09-04 DIAGNOSIS — F32.1 CURRENT MODERATE EPISODE OF MAJOR DEPRESSIVE DISORDER, UNSPECIFIED WHETHER RECURRENT: ICD-10-CM

## 2025-09-04 DIAGNOSIS — Z00.00 ENCOUNTER FOR MEDICARE ANNUAL WELLNESS EXAM: Primary | ICD-10-CM

## 2025-09-04 PROCEDURE — G0008 ADMIN INFLUENZA VIRUS VAC: HCPCS | Mod: PBBFAC,PN

## 2025-09-04 PROCEDURE — 99999PBSHW PR PBB SHADOW TECHNICAL ONLY FILED TO HB: Mod: PBBFAC,,,

## 2025-09-04 PROCEDURE — 99999 PR PBB SHADOW E&M-EST. PATIENT-LVL IV: CPT | Mod: PBBFAC,,,

## 2025-09-04 PROCEDURE — 99214 OFFICE O/P EST MOD 30 MIN: CPT | Mod: PBBFAC,PN

## 2025-09-04 PROCEDURE — 90661 CCIIV3 VAC ABX FR 0.5 ML IM: CPT | Mod: PBBFAC,PN

## 2025-09-04 RX ADMIN — INFLUENZA A VIRUS A/GEORGIA/12/2022 CVR-167 (H1N1) ANTIGEN (MDCK CELL DERIVED, PROPIOLACTONE INACTIVATED), INFLUENZA A VIRUS A/VICTORIA/800/2024 CVR-289 (H3N2) ANTIGEN (MDCK CELL DERIVED, PROPIOLACTONE INACTIVATED), INFLUENZA B VIRUS B/SINGAPORE/WUH4618/2021 ANTIGEN (MDCK CELL DERIVED, PROPIOLACTONE INACTIVATED) 0.5 ML: 15; 15; 15 INJECTION, SUSPENSION INTRAMUSCULAR at 10:09
